# Patient Record
Sex: MALE | Race: WHITE | Employment: OTHER | URBAN - METROPOLITAN AREA
[De-identification: names, ages, dates, MRNs, and addresses within clinical notes are randomized per-mention and may not be internally consistent; named-entity substitution may affect disease eponyms.]

---

## 2017-01-06 RX ORDER — CLOMIPRAMINE HYDROCHLORIDE 75 MG/1
CAPSULE ORAL
Qty: 180 CAPSULE | Refills: 2 | Status: SHIPPED | OUTPATIENT
Start: 2017-01-06 | End: 2017-08-22 | Stop reason: SDUPTHER

## 2017-01-26 ENCOUNTER — OFFICE VISIT (OUTPATIENT)
Dept: INTERNAL MEDICINE CLINIC | Age: 67
End: 2017-01-26

## 2017-01-26 VITALS
BODY MASS INDEX: 31.71 KG/M2 | HEART RATE: 66 BPM | HEIGHT: 67 IN | WEIGHT: 202 LBS | SYSTOLIC BLOOD PRESSURE: 116 MMHG | DIASTOLIC BLOOD PRESSURE: 80 MMHG | RESPIRATION RATE: 12 BRPM

## 2017-01-26 DIAGNOSIS — Z00.00 ANNUAL PHYSICAL EXAM: Primary | ICD-10-CM

## 2017-01-26 DIAGNOSIS — Z11.59 NEED FOR HEPATITIS C SCREENING TEST: ICD-10-CM

## 2017-01-26 DIAGNOSIS — E78.5 DYSLIPIDEMIA: ICD-10-CM

## 2017-01-26 DIAGNOSIS — Z23 NEED FOR TDAP VACCINATION: ICD-10-CM

## 2017-01-26 DIAGNOSIS — Z12.5 SCREENING FOR PROSTATE CANCER: ICD-10-CM

## 2017-01-26 LAB
A/G RATIO: 2.1 (ref 1.1–2.2)
ALBUMIN SERPL-MCNC: 4.7 G/DL (ref 3.4–5)
ALP BLD-CCNC: 56 U/L (ref 40–129)
ALT SERPL-CCNC: 31 U/L (ref 10–40)
ANION GAP SERPL CALCULATED.3IONS-SCNC: 15 MMOL/L (ref 3–16)
AST SERPL-CCNC: 30 U/L (ref 15–37)
BASOPHILS ABSOLUTE: 0.1 K/UL (ref 0–0.2)
BASOPHILS RELATIVE PERCENT: 0.9 %
BILIRUB SERPL-MCNC: 0.3 MG/DL (ref 0–1)
BUN BLDV-MCNC: 22 MG/DL (ref 7–20)
CALCIUM SERPL-MCNC: 10.1 MG/DL (ref 8.3–10.6)
CHLORIDE BLD-SCNC: 98 MMOL/L (ref 99–110)
CHOLESTEROL, TOTAL: 128 MG/DL (ref 0–199)
CO2: 26 MMOL/L (ref 21–32)
CREAT SERPL-MCNC: 1.2 MG/DL (ref 0.8–1.3)
EOSINOPHILS ABSOLUTE: 0.2 K/UL (ref 0–0.6)
EOSINOPHILS RELATIVE PERCENT: 3.4 %
GFR AFRICAN AMERICAN: >60
GFR NON-AFRICAN AMERICAN: >60
GLOBULIN: 2.2 G/DL
GLUCOSE BLD-MCNC: 93 MG/DL (ref 70–99)
HCT VFR BLD CALC: 40.7 % (ref 40.5–52.5)
HDLC SERPL-MCNC: 50 MG/DL (ref 40–60)
HEMOGLOBIN: 13.2 G/DL (ref 13.5–17.5)
HEPATITIS C ANTIBODY INTERPRETATION: NORMAL
LDL CHOLESTEROL CALCULATED: 56 MG/DL
LYMPHOCYTES ABSOLUTE: 1.4 K/UL (ref 1–5.1)
LYMPHOCYTES RELATIVE PERCENT: 21.3 %
MCH RBC QN AUTO: 32.6 PG (ref 26–34)
MCHC RBC AUTO-ENTMCNC: 32.5 G/DL (ref 31–36)
MCV RBC AUTO: 100.4 FL (ref 80–100)
MONOCYTES ABSOLUTE: 0.4 K/UL (ref 0–1.3)
MONOCYTES RELATIVE PERCENT: 6.6 %
NEUTROPHILS ABSOLUTE: 4.4 K/UL (ref 1.7–7.7)
NEUTROPHILS RELATIVE PERCENT: 67.8 %
PDW BLD-RTO: 13.8 % (ref 12.4–15.4)
PLATELET # BLD: 297 K/UL (ref 135–450)
PMV BLD AUTO: 8.3 FL (ref 5–10.5)
POTASSIUM SERPL-SCNC: 4.9 MMOL/L (ref 3.5–5.1)
PROSTATE SPECIFIC ANTIGEN: 0.17 NG/ML (ref 0–4)
RBC # BLD: 4.05 M/UL (ref 4.2–5.9)
SODIUM BLD-SCNC: 139 MMOL/L (ref 136–145)
TOTAL PROTEIN: 6.9 G/DL (ref 6.4–8.2)
TRIGL SERPL-MCNC: 111 MG/DL (ref 0–150)
TSH SERPL DL<=0.05 MIU/L-ACNC: 2.77 UIU/ML (ref 0.27–4.2)
VITAMIN D 25-HYDROXY: 47.3 NG/ML
VLDLC SERPL CALC-MCNC: 22 MG/DL
WBC # BLD: 6.5 K/UL (ref 4–11)

## 2017-01-26 PROCEDURE — 99397 PER PM REEVAL EST PAT 65+ YR: CPT | Performed by: INTERNAL MEDICINE

## 2017-01-26 PROCEDURE — 90715 TDAP VACCINE 7 YRS/> IM: CPT | Performed by: INTERNAL MEDICINE

## 2017-01-26 PROCEDURE — 93000 ELECTROCARDIOGRAM COMPLETE: CPT | Performed by: INTERNAL MEDICINE

## 2017-01-26 PROCEDURE — 90471 IMMUNIZATION ADMIN: CPT | Performed by: INTERNAL MEDICINE

## 2017-01-27 LAB
ESTIMATED AVERAGE GLUCOSE: 111.2 MG/DL
HBA1C MFR BLD: 5.5 %

## 2017-04-06 RX ORDER — FENOFIBRATE 134 MG/1
CAPSULE ORAL
Qty: 90 CAPSULE | Refills: 3 | Status: SHIPPED | OUTPATIENT
Start: 2017-04-06 | End: 2018-04-05 | Stop reason: SDUPTHER

## 2017-07-27 ENCOUNTER — OFFICE VISIT (OUTPATIENT)
Dept: INTERNAL MEDICINE CLINIC | Age: 67
End: 2017-07-27

## 2017-07-27 VITALS
SYSTOLIC BLOOD PRESSURE: 110 MMHG | DIASTOLIC BLOOD PRESSURE: 67 MMHG | HEART RATE: 67 BPM | BODY MASS INDEX: 30.7 KG/M2 | WEIGHT: 196 LBS

## 2017-07-27 DIAGNOSIS — I83.93 VARICOSE VEINS OF BOTH LOWER EXTREMITIES: ICD-10-CM

## 2017-07-27 DIAGNOSIS — E78.5 DYSLIPIDEMIA: Primary | ICD-10-CM

## 2017-07-27 DIAGNOSIS — I87.2 VENOUS INSUFFICIENCY OF BOTH LOWER EXTREMITIES: ICD-10-CM

## 2017-07-27 DIAGNOSIS — F32.5 MAJOR DEPRESSIVE DISORDER WITH SINGLE EPISODE, IN FULL REMISSION (HCC): ICD-10-CM

## 2017-07-27 DIAGNOSIS — R06.83 SNORING: ICD-10-CM

## 2017-07-27 PROCEDURE — 99214 OFFICE O/P EST MOD 30 MIN: CPT | Performed by: INTERNAL MEDICINE

## 2017-07-31 ENCOUNTER — TELEPHONE (OUTPATIENT)
Dept: SLEEP MEDICINE | Age: 67
End: 2017-07-31

## 2017-08-22 RX ORDER — CLOMIPRAMINE HYDROCHLORIDE 75 MG/1
CAPSULE ORAL
Qty: 180 CAPSULE | Refills: 3 | Status: SHIPPED | OUTPATIENT
Start: 2017-08-22 | End: 2018-08-09 | Stop reason: SDUPTHER

## 2017-08-25 RX ORDER — METOPROLOL SUCCINATE 100 MG/1
TABLET, EXTENDED RELEASE ORAL
Qty: 90 TABLET | Refills: 3 | Status: SHIPPED | OUTPATIENT
Start: 2017-08-25 | End: 2018-11-08 | Stop reason: SDUPTHER

## 2018-01-29 ENCOUNTER — OFFICE VISIT (OUTPATIENT)
Dept: INTERNAL MEDICINE CLINIC | Age: 68
End: 2018-01-29

## 2018-01-29 VITALS
WEIGHT: 203 LBS | SYSTOLIC BLOOD PRESSURE: 120 MMHG | RESPIRATION RATE: 12 BRPM | HEIGHT: 67 IN | DIASTOLIC BLOOD PRESSURE: 74 MMHG | HEART RATE: 72 BPM | BODY MASS INDEX: 31.86 KG/M2

## 2018-01-29 DIAGNOSIS — M25.552 LEFT HIP PAIN: ICD-10-CM

## 2018-01-29 DIAGNOSIS — Z00.00 ANNUAL PHYSICAL EXAM: Primary | ICD-10-CM

## 2018-01-29 DIAGNOSIS — M19.041 PRIMARY OSTEOARTHRITIS OF BOTH HANDS: ICD-10-CM

## 2018-01-29 DIAGNOSIS — I87.2 VENOUS INSUFFICIENCY OF BOTH LOWER EXTREMITIES: ICD-10-CM

## 2018-01-29 DIAGNOSIS — Z12.5 SCREENING FOR PROSTATE CANCER: ICD-10-CM

## 2018-01-29 DIAGNOSIS — E78.5 DYSLIPIDEMIA: ICD-10-CM

## 2018-01-29 DIAGNOSIS — M19.042 PRIMARY OSTEOARTHRITIS OF BOTH HANDS: ICD-10-CM

## 2018-01-29 LAB
A/G RATIO: 1.7 (ref 1.1–2.2)
ALBUMIN SERPL-MCNC: 4.5 G/DL (ref 3.4–5)
ALP BLD-CCNC: 70 U/L (ref 40–129)
ALT SERPL-CCNC: 22 U/L (ref 10–40)
ANION GAP SERPL CALCULATED.3IONS-SCNC: 13 MMOL/L (ref 3–16)
AST SERPL-CCNC: 21 U/L (ref 15–37)
BASOPHILS ABSOLUTE: 0 K/UL (ref 0–0.2)
BASOPHILS RELATIVE PERCENT: 0.6 %
BILIRUB SERPL-MCNC: 0.3 MG/DL (ref 0–1)
BUN BLDV-MCNC: 19 MG/DL (ref 7–20)
CALCIUM SERPL-MCNC: 9.9 MG/DL (ref 8.3–10.6)
CHLORIDE BLD-SCNC: 102 MMOL/L (ref 99–110)
CHOLESTEROL, TOTAL: 166 MG/DL (ref 0–199)
CO2: 28 MMOL/L (ref 21–32)
CREAT SERPL-MCNC: 1.1 MG/DL (ref 0.8–1.3)
EOSINOPHILS ABSOLUTE: 0.2 K/UL (ref 0–0.6)
EOSINOPHILS RELATIVE PERCENT: 4.6 %
GFR AFRICAN AMERICAN: >60
GFR NON-AFRICAN AMERICAN: >60
GLOBULIN: 2.7 G/DL
GLUCOSE BLD-MCNC: 98 MG/DL (ref 70–99)
HCT VFR BLD CALC: 39.7 % (ref 40.5–52.5)
HDLC SERPL-MCNC: 50 MG/DL (ref 40–60)
HEMOGLOBIN: 13 G/DL (ref 13.5–17.5)
LDL CHOLESTEROL CALCULATED: 83 MG/DL
LYMPHOCYTES ABSOLUTE: 1.1 K/UL (ref 1–5.1)
LYMPHOCYTES RELATIVE PERCENT: 25 %
MCH RBC QN AUTO: 33 PG (ref 26–34)
MCHC RBC AUTO-ENTMCNC: 33.3 G/DL (ref 31–36)
MCV RBC AUTO: 99.1 FL (ref 80–100)
MONOCYTES ABSOLUTE: 0.4 K/UL (ref 0–1.3)
MONOCYTES RELATIVE PERCENT: 8 %
NEUTROPHILS ABSOLUTE: 2.8 K/UL (ref 1.7–7.7)
NEUTROPHILS RELATIVE PERCENT: 61.8 %
PDW BLD-RTO: 14.2 % (ref 12.4–15.4)
PLATELET # BLD: 316 K/UL (ref 135–450)
PMV BLD AUTO: 8.1 FL (ref 5–10.5)
POTASSIUM SERPL-SCNC: 5.6 MMOL/L (ref 3.5–5.1)
PROSTATE SPECIFIC ANTIGEN: 0.14 NG/ML (ref 0–4)
RBC # BLD: 4.01 M/UL (ref 4.2–5.9)
SODIUM BLD-SCNC: 143 MMOL/L (ref 136–145)
TOTAL PROTEIN: 7.2 G/DL (ref 6.4–8.2)
TRIGL SERPL-MCNC: 165 MG/DL (ref 0–150)
TSH SERPL DL<=0.05 MIU/L-ACNC: 4.04 UIU/ML (ref 0.27–4.2)
VLDLC SERPL CALC-MCNC: 33 MG/DL
WBC # BLD: 7.1 K/UL (ref 4–11)

## 2018-01-29 PROCEDURE — 99397 PER PM REEVAL EST PAT 65+ YR: CPT | Performed by: INTERNAL MEDICINE

## 2018-01-29 PROCEDURE — 93000 ELECTROCARDIOGRAM COMPLETE: CPT | Performed by: INTERNAL MEDICINE

## 2018-01-29 RX ORDER — CELECOXIB 200 MG/1
200 CAPSULE ORAL DAILY
Qty: 30 CAPSULE | Refills: 2 | Status: SHIPPED | OUTPATIENT
Start: 2018-01-29 | End: 2018-02-07 | Stop reason: ALTCHOICE

## 2018-01-29 ASSESSMENT — PATIENT HEALTH QUESTIONNAIRE - PHQ9
SUM OF ALL RESPONSES TO PHQ9 QUESTIONS 1 & 2: 0
1. LITTLE INTEREST OR PLEASURE IN DOING THINGS: 0
SUM OF ALL RESPONSES TO PHQ QUESTIONS 1-9: 0
2. FEELING DOWN, DEPRESSED OR HOPELESS: 0

## 2018-01-29 NOTE — PROGRESS NOTES
trauma. PSYCHIATRIC:  Neg  Depression, anxiety, personality changes, nervousness, No psych counseling: Yes, Psychotropics: Yes. 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 H/O Alcohol, Benzo and narcotic abuse  SKIN :  Neg  Rash,  sun burns, tattoos, change in moles, or skin color changes.  + Nail changes. Kal Snell, SHERIDAN (Dr. Kassie Marte). ENDOCRINE:  Neg  Polydipsia,polyuria,abnormal weight changes,heat /cold intolerance, Hair changes. No Diabetes, or Thyroid disease. Preventive Care:    Health Maintenance   Topic Date Due    Colon cancer screen colonoscopy  11/07/2018    Lipid screen  01/26/2022    DTaP/Tdap/Td vaccine (2 - Td) 01/26/2027    Zostavax vaccine  Completed    Flu vaccine  Completed    Pneumococcal low/med risk  Completed    Hepatitis C screen  Completed      Self-testicular exams: Yes  Sexual activity: none  Last eye exam: 11/2016, normal, glasses. Exercise: no regular exercise. Diet: \"Could be better. \"  He tries to adhere to LF. Seatbelt use: Y  Sunscreen Use: Y  Dentist:         Physical Exam    /74   Pulse 72   Resp 12   Ht 5' 7\" (1.702 m)   Wt 203 lb (92.1 kg)   BMI 31.79 kg/m²     Wt Readings from Last 3 Encounters:   01/29/18 203 lb (92.1 kg)   07/27/17 196 lb (88.9 kg)   01/26/17 202 lb (91.6 kg)     GEN:  75 y.o. male who is in NAD, A&O. He appears stated age and well nourished. He is cooperative and pleasant. Overweight  HEAD:  NC/AT, no lesions. EYES:  ERUM, EOMI, No scleral icterus or conjunctival injection or discharge. Visual fields in tact to confrontation. EARS:  TM's NL  NOSE:  Nasal cavity is clear. No mucosal congestion or discharge. Sinuses are nontender. MOUTH & THROAT:  Oral cavity is clear without mucosal lesions. Tongue is midline. Dentition is in good repair. No pharyngeal erythema or exudate. NECK:  Supple. Full ROM. Trachea is midline. No increased JVD. No thyromegaly or nodules.   No masses  LYMPH: No C/SC/A/F nodes  CARDIAC:  S1S2 Component Value Date    PSA 0.17 01/26/2017     Test recommended and ordered    Colon Cancer Screen  Last colonoscopy: 11/7/2013, Dr. Arnold Centers is due this Nov, 2018. Diabetes Screen  Glucose (mg/dL)   Date Value   01/26/2017 93    Test recommended and ordered   Cholesterol Screen  Lab Results   Component Value Date    CHOL 128 01/26/2017    TRIG 111 01/26/2017    HDL 50 01/26/2017    LDLCALC 56 01/26/2017    Test recommended and ordered   HIV screening recommended for those ages 12-76 NO MATTER THE RISK FOR HIV-- None on file. Not clinically indicated   Hepatitis C screening recommended for those born between [facility]-- 1/26/2017  No need to repeat at this time    Aspirin for Cardiovascular Prevention   yes Continue daily aspirin    Recommended Immunizations    Immunization History   Administered Date(s) Administered    Hepatitis A 10/20/2015, 04/18/2016    Hepatitis B (Engerix-B) 10/20/2015, 04/18/2016    Hepatitis B (Recombivax HB) 12/29/2015    Influenza Vaccine, unspecified formulation 11/11/2016    Influenza Virus Vaccine 10/16/2013, 09/09/2015, 09/28/2015    Influenza, High Dose 09/09/2017    Pneumococcal 13-valent Conjugate (Dmwvnjl19) 10/20/2015    Pneumococcal Polysaccharide (Fctoejsph50) 06/27/2014    Tdap (Boostrix, Adacel) 01/26/2017    Tetanus 09/19/2007    Zoster 06/18/2008    Influenza vaccine: recommended every fall-- Up to date    Pneumonia vaccine: Prevnar is due    Shingles vaccine: No need to repeat    Tetanus vaccine: tetanus and diptheria vaccine (Td/Tdap) recommended every 10 years- Td is due in 2027        Additional Recommendations   1. Continue to use sunscreen to help reduce the risk of skin cancer. Use sunscreen daily as part of your daily routine  2. Continue to focus on lifestyle modification including a low-fat, portion control diet along with establishing a regular exercise regimen. Walking for 30 minutes 5 days week is a good start  3.  Always wear a

## 2018-01-29 NOTE — PATIENT INSTRUCTIONS
modification including a low-fat, portion control diet along with establishing a regular exercise regimen. Walking for 30 minutes 5 days week is a good start  3. Always wear a seat belt while in a car  4. Always wear a helmet when riding a bike  5. Update eye exam every year  6. Perform monthly self testicle checks  7. See her dermatologist yearly          Here are a few  Reliable websites with a variety of health and wellness information:   www.mylifecheck. heart. org     www.nutritionsource. org     www. americanheart. org     www. diabetes. org     www.[facility]     www.CORD:USE [facility] ([address] [facility] site)          Patient Education        Arthritis: Care Instructions  Your Care Instructions  Arthritis, also called osteoarthritis, is a breakdown of the cartilage that cushions your joints. When the cartilage wears down, your bones rub against each other. This causes pain and stiffness. Many people have some arthritis as they age. Arthritis most often affects the joints of the spine, hands, hips, knees, or feet. You can take simple measures to protect your joints, ease your pain, and help you stay active. Follow-up care is a key part of your treatment and safety. Be sure to make and go to all appointments, and call your doctor if you are having problems. It's also a good idea to know your test results and keep a list of the medicines you take. How can you care for yourself at home? · Stay at a healthy weight. Being overweight puts extra strain on your joints. · Talk to your doctor or physical therapist about exercises that will help ease joint pain. ¨ Stretch. You may enjoy gentle forms of yoga to help keep your joints and muscles flexible. ¨ Walk instead of jog. Other types of exercise that are less stressful on the joints include riding a bicycle, swimming, pepper chi, or water exercise. ¨ Lift weights. Strong muscles help reduce stress on your joints.  Stronger thigh muscles, for example, take some of the as of: October 31, 2016  Content Version: 11.5  © 3238-8691 Speedshape. Care instructions adapted under license by [facility] [address] If you have questions about a medical condition or this instruction, always ask your healthcare professional. Lisa Shah any warranty or liability for your use of this information. Patient Education        Hand Arthritis: Exercises  Your Care Instructions  Here are some examples of exercises for hand arthritis. Start each exercise slowly. Ease off the exercise if you start to have pain. Your doctor or your physical or occupational therapist will tell you when you can start these exercises and which ones will work best for you. How to do the exercises  Tendon glides    1. In this exercise, the steps follow one another to a make a continuous movement. 2. With your affected hand, point your fingers and thumb straight up. Your wrist should be relaxed, following the line of your fingers and thumb. 3. Curl your fingers so that the top two joints in them are bent, and your fingers wrap down. Your fingertips should touch or be near the base of your fingers. Your fingers will look like a hook. 4. Make a fist by bending your knuckles. Your thumb can gently rest against your index (pointing) finger. 5. Unwind your fingers slightly so that your fingertips can touch the base of your palm. Your thumb can rest against your index finger. 6. Move back to your starting position, with your fingers and thumb pointing up. 7. Repeat the series of motions 8 to 12 times. 8. Switch hands and repeat steps 1 through 6, even if only one hand is sore. Intrinsic flexion    1. Rest your affected hand on a table and bend the large joints where your fingers connect to your hand. Keep your thumb and the other joints in your fingers straight. 2. Slowly straighten your fingers. Your wrist should be relaxed, following the line of your fingers and thumb.   3. Move back to week. Walking is a good choice. You also may want to do other activities, such as running, swimming, cycling, or playing tennis or team sports. · Stay at a healthy weight. Lose weight if you need to. · Don't smoke. If you need help quitting, talk to your doctor about stop-smoking programs and medicines. These can increase your chances of quitting for good. How is high cholesterol treated? The goal of treatment is to reduce your chances of having a heart attack or stroke. The goal is not to lower your cholesterol numbers only. · You may make lifestyle changes, such as eating healthy foods, not smoking, losing weight, and being more active. · You may have to take medicine. Follow-up care is a key part of your treatment and safety. Be sure to make and go to all appointments, and call your doctor if you are having problems. It's also a good idea to know your test results and keep a list of the medicines you take. Where can you learn more? Go to https://THE Football ApppeNuScriptRx.Causes. org and sign in to your PS DEPT. account. Enter N356 in the WorldViz box to learn more about \"Learning About High Cholesterol. \"     If you do not have an account, please click on the \"Sign Up Now\" link. Current as of: September 21, 2016  Content Version: 11.5  © 5610-8297 [employer]. Care instructions adapted under license by [facility] ([facility]). If you have questions about a medical condition or this instruction, always ask your healthcare professional. [place] [address] any warranty or liability for your use of this information. Patient Education        Osteoarthritis: Care Instructions  Your Care Instructions    Arthritis is a common health problem in which the joints are inflamed. There are several kinds of arthritis. Osteoarthritis is caused by a breakdown of cartilage, the hard, thick tissue that cushions the joints.  It causes pain, stiffness, and swelling, often in the spine, bad that you cannot use the joint. ? · You have sudden back pain with weakness in your legs or loss of bowel or bladder control. ? · Your stools are black and tarlike or have streaks of blood. ? · You have severe pain and swelling in more than one joint. ? Watch closely for changes in your health, and be sure to contact your doctor if:  ? · You have side effects from the medicines, like belly pain, ongoing heartburn, or nausea. ? · Joint pain continues for more than 6 weeks, and home treatment is not helping. Where can you learn more? Go to https://dVentus TechnologiespeJumpztereb.KeraFAST. org and sign in to your TV Compass account. Enter P189 in the [place] box to learn more about \"Osteoarthritis: Care Instructions. \"     If you do not have an account, please click on the \"Sign Up Now\" link. Current as of: October 31, 2016  Content Version: 11.5  © 7488-0207 Neomobile. Care instructions adapted under license by [facility] ([facility]). If you have questions about a medical condition or this instruction, always ask your healthcare professional. [place] [address] any warranty or liability for your use of this information. Patient Education        Prostate Cancer Screening: Care Instructions  Your Care Instructions    The prostate gland is an organ found just below a man's bladder. It is the size and shape of a walnut. It surrounds the tube that carries urine from the bladder out of the body through the penis. This tube is called the urethra. Prostate cancer is the abnormal growth of cells in the prostate. It is the second most common type of cancer in men. (Skin cancer is the most common.)  Most cases of prostate cancer occur in men older than 72. The disease runs in families. And it's more common in -American men. When it's found and treated early, prostate cancer may be cured. But it is not always treated.  This is because prostate cancer may not shorten your life,

## 2018-01-30 ENCOUNTER — TELEPHONE (OUTPATIENT)
Dept: INTERNAL MEDICINE CLINIC | Age: 68
End: 2018-01-30

## 2018-02-07 RX ORDER — MELOXICAM 7.5 MG/1
7.5 TABLET ORAL DAILY
Qty: 30 TABLET | Refills: 3 | Status: SHIPPED | OUTPATIENT
Start: 2018-02-07 | End: 2018-06-14 | Stop reason: SDUPTHER

## 2018-04-05 RX ORDER — FENOFIBRATE 134 MG/1
CAPSULE ORAL
Qty: 90 CAPSULE | Refills: 3 | Status: SHIPPED | OUTPATIENT
Start: 2018-04-05 | End: 2019-05-03 | Stop reason: SDUPTHER

## 2018-06-14 RX ORDER — MELOXICAM 7.5 MG/1
7.5 TABLET ORAL DAILY
Qty: 90 TABLET | Refills: 3 | Status: SHIPPED | OUTPATIENT
Start: 2018-06-14 | End: 2018-07-26 | Stop reason: DRUGHIGH

## 2018-07-26 ENCOUNTER — OFFICE VISIT (OUTPATIENT)
Dept: INTERNAL MEDICINE CLINIC | Age: 68
End: 2018-07-26

## 2018-07-26 VITALS
WEIGHT: 215 LBS | DIASTOLIC BLOOD PRESSURE: 68 MMHG | HEIGHT: 67 IN | BODY MASS INDEX: 33.74 KG/M2 | RESPIRATION RATE: 12 BRPM | SYSTOLIC BLOOD PRESSURE: 130 MMHG | HEART RATE: 76 BPM

## 2018-07-26 DIAGNOSIS — Z80.0 FAMILY HISTORY OF COLON CANCER: ICD-10-CM

## 2018-07-26 DIAGNOSIS — I87.2 VENOUS INSUFFICIENCY OF BOTH LOWER EXTREMITIES: ICD-10-CM

## 2018-07-26 DIAGNOSIS — G89.29 CHRONIC LEFT-SIDED LOW BACK PAIN WITHOUT SCIATICA: ICD-10-CM

## 2018-07-26 DIAGNOSIS — Z12.11 SCREEN FOR COLON CANCER: ICD-10-CM

## 2018-07-26 DIAGNOSIS — I10 BENIGN ESSENTIAL HTN: ICD-10-CM

## 2018-07-26 DIAGNOSIS — E78.5 DYSLIPIDEMIA: Primary | ICD-10-CM

## 2018-07-26 DIAGNOSIS — M54.50 CHRONIC LEFT-SIDED LOW BACK PAIN WITHOUT SCIATICA: ICD-10-CM

## 2018-07-26 PROCEDURE — 99214 OFFICE O/P EST MOD 30 MIN: CPT | Performed by: INTERNAL MEDICINE

## 2018-07-26 RX ORDER — MELOXICAM 7.5 MG/1
15 TABLET ORAL DAILY
Qty: 90 TABLET | Refills: 3 | Status: SHIPPED | OUTPATIENT
Start: 2018-07-26 | End: 2018-10-19 | Stop reason: DRUGHIGH

## 2018-07-26 NOTE — PROGRESS NOTES
[facility]) Physicians  Internal Medicine  Patient Encounter  Alejandro Westfall D.O., Jhon Kincaid        Chief Complaint   Patient presents with    Medication Check    Check-Up       HPI: 70 y.o. male seen today for follow up regarding status of his current chronic medical problems listed below along with a med review. At his last visit patient was complaining of increased fatigue and snoring. He was referred for a polysomnogram.  He started an oral appliance which has helped. He did not do the sleep study. He continues to C/O Left lumbosacral region. Pain is worse after period of inactivity. He also has stiffness. Once up moving around, he loosens up. He denies radicular pain. He denies N/T. He was using meloxicam.   He denies adverse effects. CHOL-- patient remains on fenofibrate 134 mg daily. His last lipid profile revealed a triglyceride level of 165, LDL 83, total cholesterol 166, HDL 50. LFTs within normal.  He had lost some weight on a low carb diet, but gained it all back. HTN-- Patient continues to do well on Toprol-XL alone to control his blood pressure. BP at the office-- 120's/60's. Venous reflux-- Previous history----> H/O Left calf DVT x 1. He was on Eliquis. He has also had Superficial thrombophlebitis. He was on Ibuprofen for that. He did have a repeat duplex which showed reflux. No surgery recommended. Compression stockings recommended. Duplex--   Impressions   Right Impression   No evidence of deep or superficial venous thrombosis of the right lower   extremity. Evidence of severe venous insufficiency of the right CFV, saphenofemoral   junction (reflux of 0.5 seconds), anterior accessory saphenous vein (reflux of   1 second) that extend to the knee varicosities. Left Impression   No evidence of deep or superficial venous thrombosis of the left lower   extremity.    Severe valvular incompetence of the left CFV, saphenofemoral junction (reflux   of 2.7 seconds), cancer  Colonoscopy due in November 2018  - Kyler Valenzuela MD ([facility])    3. Family history of colon cancer  Colonoscopy due November 2018  - Kyler Valenzuela MD ([facility])    4. Benign essential HTN  Blood pressure is well controlled  Continue to monitor for increasing blood pressure  Continue no added salt diet  Continue Toprol-XL  - CBC Auto Differential; Future  - Comprehensive Metabolic Panel; Future  - Lipid Panel; Future    5. Venous insufficiency of both lower extremities  Condition is well controlled with compression stockings  Continue compression stockings  Continue no added salt diet      6. Chronic left-sided low back pain without sciatica  Condition persist  Increase meloxicam to 15 mg daily with food  X-ray lumbar spine  Home exercise program  - XR LUMBAR SPINE (MIN 4 VIEWS);  Future

## 2018-07-26 NOTE — PATIENT INSTRUCTIONS
hours. Put a thin cloth between the ice pack and your skin. · Take pain medicines exactly as directed. ¨ If the doctor gave you a prescription medicine for pain, take it as prescribed. ¨ If you are not taking a prescription pain medicine, ask your doctor if you can take an over-the-counter medicine. · Take short walks several times a day. You can start with 5 to 10 minutes, 3 or 4 times a day, and work up to longer walks. Walk on level surfaces and avoid hills and stairs until your back is better. · Return to work and other activities as soon as you can. Continued rest without activity is usually not good for your back. · To prevent future back pain, do exercises to stretch and strengthen your back and stomach. Learn how to use good posture, safe lifting techniques, and proper body mechanics. When should you call for help? Call your doctor now or seek immediate medical care if:    · You have new or worsening numbness in your legs.     · You have new or worsening weakness in your legs. (This could make it hard to stand up.)     · You lose control of your bladder or bowels.    Watch closely for changes in your health, and be sure to contact your doctor if:    · Your pain gets worse.     · You are not getting better after 2 weeks. Where can you learn more? Go to https://Thundersoft.AUPEO!. org and sign in to your [email or web address] account. Enter R057 in the Rollbar box to learn more about \"Back Pain: Care Instructions. \"     If you do not have an account, please click on the \"Sign Up Now\" link. Current as of: November 29, 2017  Content Version: 11.6  © 9528-7297 Quidsi, [employer]orporated. Care instructions adapted under license by [facility] ([facility]). If you have questions about a medical condition or this instruction, always ask your healthcare professional. [place] [address] any warranty or liability for your use of this information.        Patient Education        Low Back Pain: Exercises  Your Care Instructions  Here are some examples of typical rehabilitation exercises for your condition. Start each exercise slowly. Ease off the exercise if you start to have pain. Your doctor or physical therapist will tell you when you can start these exercises and which ones will work best for you. How to do the exercises  Press-up    1. Lie on your stomach, supporting your body with your forearms. 2. Press your elbows down into the floor to raise your upper back. As you do this, relax your stomach muscles and allow your back to arch without using your back muscles. As your press up, do not let your hips or pelvis come off the floor. 3. Hold for 15 to 30 seconds, then relax. 4. Repeat 2 to 4 times. Alternate arm and leg (bird dog) exercise    Do this exercise slowly. Try to keep your body straight at all times, and do not let one hip drop lower than the other. 1. Start on the floor, on your hands and knees. 2. Tighten your belly muscles. 3. Raise one leg off the floor, and hold it straight out behind you. Be careful not to let your hip drop down, because that will twist your trunk. 4. Hold for about 6 seconds, then lower your leg and switch to the other leg. 5. Repeat 8 to 12 times on each leg. 6. Over time, work up to holding for 10 to 30 seconds each time. 7. If you feel stable and secure with your leg raised, try raising the opposite arm straight out in front of you at the same time. Knee-to-chest exercise    1. Lie on your back with your knees bent and your feet flat on the floor. 2. Bring one knee to your chest, keeping the other foot flat on the floor (or keeping the other leg straight, whichever feels better on your lower back). 3. Keep your lower back pressed to the floor. Hold for at least 15 to 30 seconds. 4. Relax, and lower the knee to the starting position. 5. Repeat with the other leg. Repeat 2 to 4 times with each leg.   6. To get more stretch, put your other leg flat on the floor while pulling your knee to your chest.  Curl-ups    1. Lie on the floor on your back with your knees bent at a 90-degree angle. Your feet should be flat on the floor, about 12 inches from your buttocks. 2. Cross your arms over your chest. If this bothers your neck, try putting your hands behind your neck (not your head), with your elbows spread apart. 3. Slowly tighten your belly muscles and raise your shoulder blades off the floor. 4. Keep your head in line with your body, and do not press your chin to your chest.  5. Hold this position for 1 or 2 seconds, then slowly lower yourself back down to the floor. 6. Repeat 8 to 12 times. Pelvic tilt exercise    1. Lie on your back with your knees bent. 2. \"Brace\" your stomach. This means to tighten your muscles by pulling in and imagining your belly button moving toward your spine. You should feel like your back is pressing to the floor and your hips and pelvis are rocking back. 3. Hold for about 6 seconds while you breathe smoothly. 4. Repeat 8 to 12 times. Heel dig bridging    1. Lie on your back with both knees bent and your ankles bent so that only your heels are digging into the floor. Your knees should be bent about 90 degrees. 2. Then push your heels into the floor, squeeze your buttocks, and lift your hips off the floor until your shoulders, hips, and knees are all in a straight line. 3. Hold for about 6 seconds as you continue to breathe normally, and then slowly lower your hips back down to the floor and rest for up to 10 seconds. 4. Do 8 to 12 repetitions. Hamstring stretch in doorway    1. Lie on your back in a doorway, with one leg through the open door. 2. Slide your leg up the wall to straighten your knee. You should feel a gentle stretch down the back of your leg. 3. Hold the stretch for at least 15 to 30 seconds. Do not arch your back, point your toes, or bend either knee.  Keep one heel touching the floor and the other heel you when you can start these exercises and which ones will work best for you. When you are not being active, find a comfortable position for rest. Some people are comfortable on the floor or a medium-firm bed with a small pillow under their head and another under their knees. Some people prefer to lie on their side with a pillow between their knees. Don't stay in one position for too long. Take short walks (10 to 20 minutes) every 2 to 3 hours. Avoid slopes, hills, and stairs until you feel better. Walk only distances you can manage without pain, especially leg pain. How to do the exercises  Pelvic tilt    5. Lie on your back with your knees bent. 6. \"Brace\" your stomach-tighten your muscles by pulling in and imagining your belly button moving toward your spine. 7. Press your lower back into the floor. You should feel your hips and pelvis rock back. 8. Hold for 6 seconds while breathing smoothly. 9. Relax and allow your pelvis and hips to rock forward. 10. Repeat 8 to 12 times. Back stretches    8. Get down on your hands and knees on the floor. 9. Relax your head and allow it to droop. Round your back up toward the ceiling until you feel a nice stretch in your upper, middle, and lower back. Hold this stretch for as long as it feels comfortable, or about 15 to 30 seconds. 10. Return to the starting position with a flat back while you are on your hands and knees. 11. Let your back sway by pressing your stomach toward the floor. Lift your buttocks toward the ceiling. 12. Hold this position for 15 to 30 seconds. 13. Repeat 2 to 4 times. Follow-up care is a key part of your treatment and safety. Be sure to make and go to all appointments, and call your doctor if you are having problems. It's also a good idea to know your test results and keep a list of the medicines you take. Where can you learn more? Go to https://brandon.ElectroCore. org and sign in to your MobileAccess Networks account.  Enter Q066 in the

## 2018-08-09 RX ORDER — CLOMIPRAMINE HYDROCHLORIDE 75 MG/1
CAPSULE ORAL
Qty: 180 CAPSULE | Refills: 3 | Status: SHIPPED | OUTPATIENT
Start: 2018-08-09 | End: 2019-05-03 | Stop reason: SDUPTHER

## 2018-10-01 ENCOUNTER — HOSPITAL ENCOUNTER (OUTPATIENT)
Age: 68
Discharge: HOME | End: 2018-10-01
Payer: COMMERCIAL

## 2018-10-01 ENCOUNTER — HOSPITAL ENCOUNTER (OUTPATIENT)
Dept: GENERAL RADIOLOGY | Age: 68
Discharge: HOME | End: 2018-10-01
Payer: COMMERCIAL

## 2018-10-01 DIAGNOSIS — G89.29 CHRONIC LEFT-SIDED LOW BACK PAIN WITHOUT SCIATICA: ICD-10-CM

## 2018-10-01 DIAGNOSIS — M54.50 CHRONIC LEFT-SIDED LOW BACK PAIN WITHOUT SCIATICA: ICD-10-CM

## 2018-10-01 PROCEDURE — 72110 X-RAY EXAM L-2 SPINE 4/>VWS: CPT

## 2018-10-19 RX ORDER — MELOXICAM 15 MG/1
15 TABLET ORAL DAILY
Qty: 90 TABLET | Refills: 3 | Status: SHIPPED | OUTPATIENT
Start: 2018-10-19 | End: 2019-02-08 | Stop reason: ALTCHOICE

## 2018-11-08 RX ORDER — METOPROLOL SUCCINATE 100 MG/1
TABLET, EXTENDED RELEASE ORAL
Qty: 90 TABLET | Refills: 3 | Status: SHIPPED | OUTPATIENT
Start: 2018-11-08 | End: 2019-05-03 | Stop reason: SDUPTHER

## 2019-02-08 ENCOUNTER — OFFICE VISIT (OUTPATIENT)
Dept: INTERNAL MEDICINE CLINIC | Age: 69
End: 2019-02-08
Payer: MEDICARE

## 2019-02-08 VITALS
HEIGHT: 67 IN | SYSTOLIC BLOOD PRESSURE: 110 MMHG | OXYGEN SATURATION: 97 % | HEART RATE: 69 BPM | RESPIRATION RATE: 12 BRPM | DIASTOLIC BLOOD PRESSURE: 70 MMHG | WEIGHT: 207.8 LBS | BODY MASS INDEX: 32.62 KG/M2

## 2019-02-08 DIAGNOSIS — D10.30 PAPILLOMA OF ORAL CAVITY: ICD-10-CM

## 2019-02-08 DIAGNOSIS — Z23 NEED FOR PROPHYLACTIC VACCINATION AND INOCULATION AGAINST VARICELLA: ICD-10-CM

## 2019-02-08 DIAGNOSIS — E78.5 DYSLIPIDEMIA: ICD-10-CM

## 2019-02-08 DIAGNOSIS — Z00.00 ROUTINE GENERAL MEDICAL EXAMINATION AT A HEALTH CARE FACILITY: Primary | ICD-10-CM

## 2019-02-08 DIAGNOSIS — Z12.5 SCREENING FOR PROSTATE CANCER: ICD-10-CM

## 2019-02-08 DIAGNOSIS — G89.29 CHRONIC BILATERAL LOW BACK PAIN WITHOUT SCIATICA: ICD-10-CM

## 2019-02-08 DIAGNOSIS — M51.36 DDD (DEGENERATIVE DISC DISEASE), LUMBAR: ICD-10-CM

## 2019-02-08 DIAGNOSIS — M54.50 CHRONIC BILATERAL LOW BACK PAIN WITHOUT SCIATICA: ICD-10-CM

## 2019-02-08 DIAGNOSIS — M43.17 ACQUIRED SPONDYLOLISTHESIS OF LUMBOSACRAL REGION: ICD-10-CM

## 2019-02-08 PROBLEM — M51.369 DDD (DEGENERATIVE DISC DISEASE), LUMBAR: Status: ACTIVE | Noted: 2019-02-08

## 2019-02-08 PROCEDURE — G0402 INITIAL PREVENTIVE EXAM: HCPCS | Performed by: INTERNAL MEDICINE

## 2019-02-08 PROCEDURE — 4040F PNEUMOC VAC/ADMIN/RCVD: CPT | Performed by: INTERNAL MEDICINE

## 2019-02-08 RX ORDER — FENOFIBRATE 160 MG/1
160 TABLET ORAL DAILY
Qty: 90 TABLET | Refills: 3 | Status: SHIPPED | OUTPATIENT
Start: 2019-02-08 | End: 2019-05-13 | Stop reason: SDUPTHER

## 2019-02-08 ASSESSMENT — LIFESTYLE VARIABLES: HOW OFTEN DO YOU HAVE A DRINK CONTAINING ALCOHOL: 0

## 2019-02-08 ASSESSMENT — PATIENT HEALTH QUESTIONNAIRE - PHQ9
SUM OF ALL RESPONSES TO PHQ QUESTIONS 1-9: 0
SUM OF ALL RESPONSES TO PHQ QUESTIONS 1-9: 0

## 2019-02-08 ASSESSMENT — ANXIETY QUESTIONNAIRES: GAD7 TOTAL SCORE: 0

## 2019-02-14 ENCOUNTER — HOSPITAL ENCOUNTER (OUTPATIENT)
Dept: MRI IMAGING | Age: 69
Discharge: HOME | End: 2019-02-14
Payer: MEDICARE

## 2019-02-14 DIAGNOSIS — M51.36 DDD (DEGENERATIVE DISC DISEASE), LUMBAR: ICD-10-CM

## 2019-02-14 DIAGNOSIS — M54.50 CHRONIC BILATERAL LOW BACK PAIN WITHOUT SCIATICA: ICD-10-CM

## 2019-02-14 DIAGNOSIS — M43.17 ACQUIRED SPONDYLOLISTHESIS OF LUMBOSACRAL REGION: ICD-10-CM

## 2019-02-14 DIAGNOSIS — G89.29 CHRONIC BILATERAL LOW BACK PAIN WITHOUT SCIATICA: ICD-10-CM

## 2019-02-14 PROCEDURE — 72148 MRI LUMBAR SPINE W/O DYE: CPT

## 2019-02-15 DIAGNOSIS — M51.36 DDD (DEGENERATIVE DISC DISEASE), LUMBAR: Primary | ICD-10-CM

## 2019-02-15 DIAGNOSIS — M48.061 LUMBAR FORAMINAL STENOSIS: ICD-10-CM

## 2019-02-15 DIAGNOSIS — M48.061 SPINAL STENOSIS OF LUMBAR REGION WITHOUT NEUROGENIC CLAUDICATION: ICD-10-CM

## 2019-02-15 DIAGNOSIS — M47.816 LUMBAR FACET ARTHROPATHY: ICD-10-CM

## 2019-02-21 ENCOUNTER — OFFICE VISIT (OUTPATIENT)
Dept: ENT CLINIC | Age: 69
End: 2019-02-21
Payer: MEDICARE

## 2019-02-21 VITALS
DIASTOLIC BLOOD PRESSURE: 70 MMHG | BODY MASS INDEX: 29.78 KG/M2 | WEIGHT: 208 LBS | HEART RATE: 85 BPM | HEIGHT: 70 IN | SYSTOLIC BLOOD PRESSURE: 100 MMHG

## 2019-02-21 DIAGNOSIS — K13.79 PALATAL LESION: ICD-10-CM

## 2019-02-21 PROCEDURE — 42100 BIOPSY ROOF OF MOUTH: CPT | Performed by: OTOLARYNGOLOGY

## 2019-02-21 PROCEDURE — 1123F ACP DISCUSS/DSCN MKR DOCD: CPT | Performed by: OTOLARYNGOLOGY

## 2019-02-21 PROCEDURE — 1101F PT FALLS ASSESS-DOCD LE1/YR: CPT | Performed by: OTOLARYNGOLOGY

## 2019-02-21 PROCEDURE — 1036F TOBACCO NON-USER: CPT | Performed by: OTOLARYNGOLOGY

## 2019-02-21 PROCEDURE — G8417 CALC BMI ABV UP PARAM F/U: HCPCS | Performed by: OTOLARYNGOLOGY

## 2019-02-21 PROCEDURE — 4040F PNEUMOC VAC/ADMIN/RCVD: CPT | Performed by: OTOLARYNGOLOGY

## 2019-02-21 PROCEDURE — G8482 FLU IMMUNIZE ORDER/ADMIN: HCPCS | Performed by: OTOLARYNGOLOGY

## 2019-02-21 PROCEDURE — 3017F COLORECTAL CA SCREEN DOC REV: CPT | Performed by: OTOLARYNGOLOGY

## 2019-02-21 PROCEDURE — G8427 DOCREV CUR MEDS BY ELIG CLIN: HCPCS | Performed by: OTOLARYNGOLOGY

## 2019-02-21 PROCEDURE — 99203 OFFICE O/P NEW LOW 30 MIN: CPT | Performed by: OTOLARYNGOLOGY

## 2019-02-21 ASSESSMENT — ENCOUNTER SYMPTOMS
CHEST TIGHTNESS: 0
APNEA: 0
VOMITING: 0
STRIDOR: 0
TROUBLE SWALLOWING: 0
CHOKING: 0
COLOR CHANGE: 0
CONSTIPATION: 0
RHINORRHEA: 0
EYE PAIN: 0
DIARRHEA: 0
FACIAL SWELLING: 0
SINUS PAIN: 0
NAUSEA: 0
SHORTNESS OF BREATH: 0
COUGH: 0
EYE DISCHARGE: 0
SINUS PRESSURE: 0
SORE THROAT: 0
WHEEZING: 0
VOICE CHANGE: 0
BACK PAIN: 0
BLOOD IN STOOL: 0

## 2019-02-25 ENCOUNTER — OFFICE VISIT (OUTPATIENT)
Dept: ORTHOPEDIC SURGERY | Age: 69
End: 2019-02-25
Payer: MEDICARE

## 2019-02-25 ENCOUNTER — TELEPHONE (OUTPATIENT)
Dept: ORTHOPEDIC SURGERY | Age: 69
End: 2019-02-25

## 2019-02-25 VITALS
DIASTOLIC BLOOD PRESSURE: 86 MMHG | BODY MASS INDEX: 31.37 KG/M2 | WEIGHT: 207 LBS | HEIGHT: 68 IN | SYSTOLIC BLOOD PRESSURE: 129 MMHG

## 2019-02-25 DIAGNOSIS — M47.816 SPONDYLOSIS OF LUMBAR REGION WITHOUT MYELOPATHY OR RADICULOPATHY: Primary | ICD-10-CM

## 2019-02-25 DIAGNOSIS — M43.16 SPONDYLOLISTHESIS OF LUMBAR REGION: ICD-10-CM

## 2019-02-25 DIAGNOSIS — M48.061 LUMBAR FORAMINAL STENOSIS: ICD-10-CM

## 2019-02-25 PROCEDURE — 3017F COLORECTAL CA SCREEN DOC REV: CPT | Performed by: PHYSICAL MEDICINE & REHABILITATION

## 2019-02-25 PROCEDURE — G8427 DOCREV CUR MEDS BY ELIG CLIN: HCPCS | Performed by: PHYSICAL MEDICINE & REHABILITATION

## 2019-02-25 PROCEDURE — G8482 FLU IMMUNIZE ORDER/ADMIN: HCPCS | Performed by: PHYSICAL MEDICINE & REHABILITATION

## 2019-02-25 PROCEDURE — G8417 CALC BMI ABV UP PARAM F/U: HCPCS | Performed by: PHYSICAL MEDICINE & REHABILITATION

## 2019-02-25 PROCEDURE — 1101F PT FALLS ASSESS-DOCD LE1/YR: CPT | Performed by: PHYSICAL MEDICINE & REHABILITATION

## 2019-02-25 PROCEDURE — 99203 OFFICE O/P NEW LOW 30 MIN: CPT | Performed by: PHYSICAL MEDICINE & REHABILITATION

## 2019-02-26 ENCOUNTER — TELEPHONE (OUTPATIENT)
Dept: ORTHOPEDIC SURGERY | Age: 69
End: 2019-02-26

## 2019-02-26 ENCOUNTER — TELEPHONE (OUTPATIENT)
Dept: ENT CLINIC | Age: 69
End: 2019-02-26

## 2019-03-04 ENCOUNTER — APPOINTMENT (OUTPATIENT)
Dept: GENERAL RADIOLOGY | Age: 69
End: 2019-03-04
Attending: PHYSICAL MEDICINE & REHABILITATION
Payer: MEDICARE

## 2019-03-04 ENCOUNTER — TELEPHONE (OUTPATIENT)
Dept: INTERNAL MEDICINE CLINIC | Age: 69
End: 2019-03-04

## 2019-03-04 ENCOUNTER — HOSPITAL ENCOUNTER (OUTPATIENT)
Age: 69
Setting detail: OUTPATIENT SURGERY
Discharge: HOME | End: 2019-03-04
Attending: PHYSICAL MEDICINE & REHABILITATION | Admitting: PHYSICAL MEDICINE & REHABILITATION
Payer: MEDICARE

## 2019-03-04 VITALS
HEIGHT: 68 IN | TEMPERATURE: 97.4 F | OXYGEN SATURATION: 100 % | HEART RATE: 63 BPM | RESPIRATION RATE: 16 BRPM | SYSTOLIC BLOOD PRESSURE: 122 MMHG | DIASTOLIC BLOOD PRESSURE: 66 MMHG | BODY MASS INDEX: 31.47 KG/M2

## 2019-03-04 PROCEDURE — 3209999900 FLUORO FOR SURGICAL PROCEDURES

## 2019-03-04 PROCEDURE — 2500000003 HC RX 250 WO HCPCS: Performed by: PHYSICAL MEDICINE & REHABILITATION

## 2019-03-04 PROCEDURE — 3610000058 HC PAIN LEVEL 5 BASE (NON-OR): Performed by: PHYSICAL MEDICINE & REHABILITATION

## 2019-03-04 PROCEDURE — 2709999900 HC NON-CHARGEABLE SUPPLY: Performed by: PHYSICAL MEDICINE & REHABILITATION

## 2019-03-04 PROCEDURE — 3610000059 HC PAIN LEVEL 5 ADDL 15 MIN (NON-OR): Performed by: PHYSICAL MEDICINE & REHABILITATION

## 2019-03-04 RX ORDER — LIDOCAINE HYDROCHLORIDE 10 MG/ML
INJECTION, SOLUTION EPIDURAL; INFILTRATION; INTRACAUDAL; PERINEURAL
Status: COMPLETED | OUTPATIENT
Start: 2019-03-04 | End: 2019-03-04

## 2019-03-04 RX ORDER — BUPIVACAINE HYDROCHLORIDE 5 MG/ML
INJECTION, SOLUTION PERINEURAL
Status: COMPLETED | OUTPATIENT
Start: 2019-03-04 | End: 2019-03-04

## 2019-03-04 ASSESSMENT — PAIN - FUNCTIONAL ASSESSMENT: PAIN_FUNCTIONAL_ASSESSMENT: 0-10

## 2019-03-04 ASSESSMENT — PAIN SCALES - GENERAL
PAINLEVEL_OUTOF10: 0
PAINLEVEL_OUTOF10: 0

## 2019-03-04 ASSESSMENT — PAIN DESCRIPTION - DESCRIPTORS: DESCRIPTORS: ACHING

## 2019-03-11 ENCOUNTER — TELEPHONE (OUTPATIENT)
Dept: ORTHOPEDIC SURGERY | Age: 69
End: 2019-03-11

## 2019-03-11 ENCOUNTER — OFFICE VISIT (OUTPATIENT)
Dept: ORTHOPEDIC SURGERY | Age: 69
End: 2019-03-11
Payer: MEDICARE

## 2019-03-11 VITALS
HEIGHT: 68 IN | HEART RATE: 71 BPM | SYSTOLIC BLOOD PRESSURE: 140 MMHG | DIASTOLIC BLOOD PRESSURE: 80 MMHG | WEIGHT: 207.01 LBS | BODY MASS INDEX: 31.37 KG/M2

## 2019-03-11 DIAGNOSIS — M47.816 SPONDYLOSIS OF LUMBAR REGION WITHOUT MYELOPATHY OR RADICULOPATHY: Primary | ICD-10-CM

## 2019-03-11 DIAGNOSIS — M48.061 LUMBAR FORAMINAL STENOSIS: ICD-10-CM

## 2019-03-11 DIAGNOSIS — M43.16 SPONDYLOLISTHESIS OF LUMBAR REGION: ICD-10-CM

## 2019-03-11 PROCEDURE — G8417 CALC BMI ABV UP PARAM F/U: HCPCS | Performed by: PHYSICAL MEDICINE & REHABILITATION

## 2019-03-11 PROCEDURE — 99213 OFFICE O/P EST LOW 20 MIN: CPT | Performed by: PHYSICAL MEDICINE & REHABILITATION

## 2019-03-11 PROCEDURE — 1101F PT FALLS ASSESS-DOCD LE1/YR: CPT | Performed by: PHYSICAL MEDICINE & REHABILITATION

## 2019-03-11 PROCEDURE — G8482 FLU IMMUNIZE ORDER/ADMIN: HCPCS | Performed by: PHYSICAL MEDICINE & REHABILITATION

## 2019-03-11 PROCEDURE — 3017F COLORECTAL CA SCREEN DOC REV: CPT | Performed by: PHYSICAL MEDICINE & REHABILITATION

## 2019-03-11 PROCEDURE — 1036F TOBACCO NON-USER: CPT | Performed by: PHYSICAL MEDICINE & REHABILITATION

## 2019-03-11 PROCEDURE — G8427 DOCREV CUR MEDS BY ELIG CLIN: HCPCS | Performed by: PHYSICAL MEDICINE & REHABILITATION

## 2019-03-11 PROCEDURE — 1123F ACP DISCUSS/DSCN MKR DOCD: CPT | Performed by: PHYSICAL MEDICINE & REHABILITATION

## 2019-03-11 PROCEDURE — 4040F PNEUMOC VAC/ADMIN/RCVD: CPT | Performed by: PHYSICAL MEDICINE & REHABILITATION

## 2019-03-14 ENCOUNTER — TELEPHONE (OUTPATIENT)
Dept: ORTHOPEDIC SURGERY | Age: 69
End: 2019-03-14

## 2019-03-25 ENCOUNTER — APPOINTMENT (OUTPATIENT)
Dept: GENERAL RADIOLOGY | Age: 69
End: 2019-03-25
Attending: PHYSICAL MEDICINE & REHABILITATION
Payer: MEDICARE

## 2019-03-25 ENCOUNTER — HOSPITAL ENCOUNTER (OUTPATIENT)
Age: 69
Setting detail: OUTPATIENT SURGERY
Discharge: HOME | End: 2019-03-25
Attending: PHYSICAL MEDICINE & REHABILITATION | Admitting: PHYSICAL MEDICINE & REHABILITATION
Payer: MEDICARE

## 2019-03-25 ENCOUNTER — TELEPHONE (OUTPATIENT)
Dept: ORTHOPEDIC SURGERY | Age: 69
End: 2019-03-25

## 2019-03-25 VITALS
SYSTOLIC BLOOD PRESSURE: 120 MMHG | BODY MASS INDEX: 30.66 KG/M2 | HEIGHT: 69 IN | TEMPERATURE: 98.1 F | RESPIRATION RATE: 16 BRPM | HEART RATE: 70 BPM | WEIGHT: 207 LBS | DIASTOLIC BLOOD PRESSURE: 72 MMHG | OXYGEN SATURATION: 100 %

## 2019-03-25 PROCEDURE — 3209999900 FLUORO FOR SURGICAL PROCEDURES

## 2019-03-25 PROCEDURE — 3610000058 HC PAIN LEVEL 5 BASE (NON-OR): Performed by: PHYSICAL MEDICINE & REHABILITATION

## 2019-03-25 PROCEDURE — 2709999900 HC NON-CHARGEABLE SUPPLY: Performed by: PHYSICAL MEDICINE & REHABILITATION

## 2019-03-25 PROCEDURE — 2500000003 HC RX 250 WO HCPCS: Performed by: PHYSICAL MEDICINE & REHABILITATION

## 2019-03-25 PROCEDURE — 99152 MOD SED SAME PHYS/QHP 5/>YRS: CPT | Performed by: PHYSICAL MEDICINE & REHABILITATION

## 2019-03-25 PROCEDURE — 6360000002 HC RX W HCPCS: Performed by: PHYSICAL MEDICINE & REHABILITATION

## 2019-03-25 RX ORDER — MIDAZOLAM HYDROCHLORIDE 1 MG/ML
INJECTION INTRAMUSCULAR; INTRAVENOUS
Status: COMPLETED | OUTPATIENT
Start: 2019-03-25 | End: 2019-03-25

## 2019-03-25 RX ORDER — BUPIVACAINE HYDROCHLORIDE 5 MG/ML
INJECTION, SOLUTION PERINEURAL
Status: COMPLETED | OUTPATIENT
Start: 2019-03-25 | End: 2019-03-25

## 2019-03-25 RX ORDER — LIDOCAINE HYDROCHLORIDE 10 MG/ML
INJECTION, SOLUTION EPIDURAL; INFILTRATION; INTRACAUDAL; PERINEURAL
Status: COMPLETED | OUTPATIENT
Start: 2019-03-25 | End: 2019-03-25

## 2019-03-25 ASSESSMENT — PAIN SCALES - GENERAL
PAINLEVEL_OUTOF10: 0
PAINLEVEL_OUTOF10: 0

## 2019-03-25 ASSESSMENT — PAIN - FUNCTIONAL ASSESSMENT: PAIN_FUNCTIONAL_ASSESSMENT: 0-10

## 2019-03-25 ASSESSMENT — PAIN DESCRIPTION - DESCRIPTORS: DESCRIPTORS: SHARP;SHOOTING

## 2019-04-01 ENCOUNTER — OFFICE VISIT (OUTPATIENT)
Dept: ORTHOPEDIC SURGERY | Age: 69
End: 2019-04-01
Payer: MEDICARE

## 2019-04-01 VITALS — BODY MASS INDEX: 30.66 KG/M2 | WEIGHT: 207.01 LBS | HEIGHT: 69 IN

## 2019-04-01 DIAGNOSIS — M43.16 SPONDYLOLISTHESIS OF LUMBAR REGION: Primary | ICD-10-CM

## 2019-04-01 DIAGNOSIS — M51.36 DDD (DEGENERATIVE DISC DISEASE), LUMBAR: ICD-10-CM

## 2019-04-01 DIAGNOSIS — M47.816 SPONDYLOSIS OF LUMBAR REGION WITHOUT MYELOPATHY OR RADICULOPATHY: ICD-10-CM

## 2019-04-01 PROCEDURE — G8427 DOCREV CUR MEDS BY ELIG CLIN: HCPCS | Performed by: PHYSICAL MEDICINE & REHABILITATION

## 2019-04-01 PROCEDURE — 1123F ACP DISCUSS/DSCN MKR DOCD: CPT | Performed by: PHYSICAL MEDICINE & REHABILITATION

## 2019-04-01 PROCEDURE — 99213 OFFICE O/P EST LOW 20 MIN: CPT | Performed by: PHYSICAL MEDICINE & REHABILITATION

## 2019-04-01 PROCEDURE — 1036F TOBACCO NON-USER: CPT | Performed by: PHYSICAL MEDICINE & REHABILITATION

## 2019-04-01 PROCEDURE — 4040F PNEUMOC VAC/ADMIN/RCVD: CPT | Performed by: PHYSICAL MEDICINE & REHABILITATION

## 2019-04-01 PROCEDURE — G8417 CALC BMI ABV UP PARAM F/U: HCPCS | Performed by: PHYSICAL MEDICINE & REHABILITATION

## 2019-04-01 PROCEDURE — 3017F COLORECTAL CA SCREEN DOC REV: CPT | Performed by: PHYSICAL MEDICINE & REHABILITATION

## 2019-04-01 NOTE — PROGRESS NOTES
Follow up: Dianna Griffith  1950  [id number]      CHIEF COMPLAINT:    Chief Complaint   Patient presents with    Back Pain     FU MBB B L3, L4, L5  3/25         HISTORY OF PRESENT ILLNESS:  Mr. Neli Kelley is a 70 y.o. male returns for a follow up visit for multiple medical problems. His current presenting problems are   1. Spondylolisthesis of lumbar region    2. Spondylosis of lumbar region without myelopathy or radiculopathy    3. DDD (degenerative disc disease), lumbar    . As per information/history obtained from the PADT(patient assessment and documentation tool) - He complains of pain in the lower back with radiation to the hips Bilateral He rates the pain 1/10 and describes it as sharp, piercing. Pain is made worse by: bending, driving. He denies side effects from the current pain regimen. Patient reports that since the last follow up visit the physical functioning is better, family/social relationships are better, mood is better and sleep patterns are better, and that the overall functioning is better. Patient denies neurological bowel or bladder. Patient returns today to follow up after MBB. He states he really only has pain when driving longer than 30 minutes and when he goes to stand up he has stiffness. He also complains of pain when standing up straight from bending over. He has noticed he has had to take less ibuprofen and tylenol.     Associated signs and symptoms:   Neurogenic bowel or bladder symptoms:  no   Perceived weakness:  no   Difficulty walking:  no              Past Medical History:   Past Medical History:   Diagnosis Date    [facility]ism ([facility] [address]     Anxiety     Basal cell cancer 8/2010    Dr. Colon Basil Depression     DVT, lower extremity ([facility] [address] 5/29/2015    Right, gastroc, soleal    Hyperlipidemia     Osteopenia     Squamous cell skin cancer 2017    right ear lobe    Stasis dermatitis     Varicose veins of both lower extremities     Venous insufficiency of both lower extremities       Past Surgical History:     Past Surgical History:   Procedure Laterality Date    ANESTHESIA NERVE BLOCK Bilateral 3/25/2019    BILATERAL L3,L4,L5 DR MEDIAL BRANCH BLOCKS WITH FLUOROSCOPY performed by Randall Wise MD at [address]      from [address] [facility] Bilateral 3/4/2019    BILATERAL L3, L4, L5 DORSAL RAMUS LUMBAR MEDIAL BRANCH BLOCK WITH FLUOROSCOPY performed by Randall Wise MD at [address] [facility]  8/2010    SKIN CANCER EXCISION Right 2017    [facility]. Current Medications:     Current Outpatient Medications:     fenofibrate 160 MG tablet, Take 1 tablet by mouth daily, Disp: 90 tablet, Rfl: 3    metoprolol succinate (TOPROL XL) 100 MG extended release tablet, TAKE 1 TABLET BY MOUTH DAILY, Disp: 90 tablet, Rfl: 3    clomiPRAMINE (ANAFRANIL) 75 MG capsule, TAKE 2 CAPSULES BY MOUTH NIGHTLY, Disp: 180 capsule, Rfl: 3    fenofibrate micronized (LOFIBRA) 134 MG capsule, TAKE ONE CAPSULE BY MOUTH EVERY DAY, Disp: 90 capsule, Rfl: 3    aspirin 81 MG tablet, Take 81 mg by mouth daily, Disp: , Rfl:     Cholecalciferol (VITAMIN D3) 2000 UNITS CAPS, Take 1 capsule by mouth daily, Disp: , Rfl:   Allergies:  Patient has no known allergies. Social History:    reports that he has never smoked. He has never used smokeless tobacco. He reports that he does not drink alcohol or use drugs.   Family History:   Family History   Problem Relation Age of Onset    High Blood Pressure Mother     Heart Disease Mother         a fib    Heart Disease Father     Diabetes Father        REVIEW OF SYSTEMS:   CONSTITUTIONAL: Denies unexplained weight loss, fevers, chills or fatigue  NEUROLOGICAL: Denies unsteady gait or progressive weakness  MUSCULOSKELETAL: Denies joint swelling or redness  GI: Denies nausea, vomiting, diarrhea   : Denies bowel or bladder issues       PHYSICAL EXAM:    Vitals: Height 5' 9.02\" (1.753 m), weight 207 lb 0.2 oz (93.9 kg). GENERAL EXAM:  · General Apparence: Patient is adequately groomed with no evidence of malnutrition. · Psychiatric: Orientation: The patient is oriented to time, place and person. The patient's mood and affect are appropriate   · Vascular: Examination reveals no swelling and palpation reveals no tenderness in upper or lower extremities. Good capillary refill. · Sensation is intact without deficit in the upper and lower extremities to light touch and pinprick  · Coordination of the upper and lower extremities are normal.    LUMBAR/SACRAL EXAMINATION:  · Inspection: Local inspection shows no step-off or bruising. Lumbar alignment is normal. No instability is noted. · Palpation:   No evidence of tenderness at the midline. Lumbar paraspinal tenderness: Mild L4/5 and L5/S1 tenderness  Bursal tenderness No tenderness bilaterally  There is no paraspinal spasm. · Range of Motion: increased pain with rotation and lateral bending bilaterally  · Strength:   Strength testing is 5/5 in all muscle groups tested. · Special Tests:   Straight leg raise and crossed SLR negative. Maurice's testing is negative bilaterally. FADIR's testing is negative bilaterally. · Skin: There are no rashes, ulcerations or lesions. · Reflexes: Reflexes are symmetrically 2+ at the patellar and ankle tendons. Clonus absent bilaterally at the feet. · Gait & station: normal, patient ambulates without assistance  · Additional Examinations:  · RIGHT LOWER EXTREMITY: Inspection/examination of the right lower extremity does not show any tenderness, deformity or injury. Range of motion is normal and pain-free. There is no gross instability. There are no rashes, ulcerations or lesions. Strength and tone are normal. No atrophy or abnormal movements are noted. · LEFT LOWER EXTREMITY:  Inspection/examination of the left lower extremity does not show any tenderness, deformity or injury. Range of motion is normal and pain-free.  There is no gross instability. There are no rashes, ulcerations or lesions. Strength and tone are normal. No atrophy or abnormal movements are noted. Diagnostic Testing:    MR Lumbar spine shows  2/14/19  Grade 2 anterolisthesis of L5.       Degenerative changes most significant at L4-5 and L5-S1.        Results for orders placed or performed in visit on 02/08/19   CBC Auto Differential   Result Value Ref Range    WBC 12.2 (H) 4.0 - 11.0 K/uL    RBC 4.12 (L) 4.20 - 5.90 M/uL    Hemoglobin 13.4 (L) 13.5 - 17.5 g/dL    Hematocrit 42.2 40.5 - 52.5 %    .3 (H) 80.0 - 100.0 fL    MCH 32.5 26.0 - 34.0 pg    MCHC 31.8 31.0 - 36.0 g/dL    RDW 13.7 12.4 - 15.4 %    Platelets 457 397 - 219 K/uL    MPV 8.0 5.0 - 10.5 fL    Neutrophils % 70.4 %    Lymphocytes % 19.0 %    Monocytes % 6.1 %    Eosinophils % 3.7 %    Basophils % 0.8 %    Neutrophils # 8.6 (H) 1.7 - 7.7 K/uL    Lymphocytes # 2.3 1.0 - 5.1 K/uL    Monocytes # 0.7 0.0 - 1.3 K/uL    Eosinophils # 0.5 0.0 - 0.6 K/uL    Basophils # 0.1 0.0 - 0.2 K/uL   Comprehensive Metabolic Panel   Result Value Ref Range    Sodium 143 136 - 145 mmol/L    Potassium 4.8 3.5 - 5.1 mmol/L    Chloride 104 99 - 110 mmol/L    CO2 24 21 - 32 mmol/L    Anion Gap 15 3 - 16    Glucose 97 70 - 99 mg/dL    BUN 19 7 - 20 mg/dL    CREATININE 1.0 0.8 - 1.3 mg/dL    GFR Non-African American >60 >60    GFR African American >60 >60    Calcium 9.5 8.3 - 10.6 mg/dL    Total Protein 7.1 6.4 - 8.2 g/dL    Alb 4.4 3.4 - 5.0 g/dL    Albumin/Globulin Ratio 1.6 1.1 - 2.2    Total Bilirubin 0.3 0.0 - 1.0 mg/dL    Alkaline Phosphatase 68 40 - 129 U/L    ALT 18 10 - 40 U/L    AST 19 15 - 37 U/L    Globulin 2.7 g/dL   Lipid Panel   Result Value Ref Range    Cholesterol, Total 138 0 - 199 mg/dL    Triglycerides 165 (H) 0 - 150 mg/dL    HDL 40 40 - 60 mg/dL    LDL Calculated 65 <100 mg/dL    VLDL Cholesterol Calculated 33 Not Established mg/dL   TSH without Reflex   Result Value Ref Range    TSH 3.55 0.27 - 4.20 uIU/mL   Psa screening   Result Value Ref Range    PSA 0.22 0.00 - 4.00 ng/mL     Impression:       1. Spondylolisthesis of lumbar region    2. Spondylosis of lumbar region without myelopathy or radiculopathy    3. DDD (degenerative disc disease), lumbar        Plan:  Clinical Course: Above diagnoses are improving     I discussed the diagnosis and the treatment options with Laurie Trejo today. In Summary:  The various treatment options were outlined and discussed with Laurie Trejo including:  Conservative care options: physical therapy, ice, medications, bracing, and activity modification. The indications for therapeutic injections. The indications for additional imaging/laboratory studies. The indications for (possible future) interventions. After considering the various options discussed, Laurie Trejo elected to pursue a course of treatment that includes the followin. Medications:  Continue anti-inflammatories with appropriate GI Precautions including to stop if develop dark tarry stools or GI upset and to take with food. 2. PT:  Encouraged to continue with HEP. 3. Further studies:  No further studies. 4. Interventional:  RFA bilateral L3, L4 and L5 DR when symptoms worsen in the future    5. Follow up:  4-6 weeks      Laurie Trejo was instructed to call the office if his symptoms worsen or if new symptoms appear prior to the next scheduled visit. He is specifically instructed to contact the office between now & his scheduled appointment if he has concerns related to his condition or if he needs assistance in scheduling the above tests. He is welcome to call for an appointment sooner if he has any additional concerns or questions. I, Marcellus Holter, am scribing for and in the presence of Dr. Wilberto Ramos. 19 12:52 PM  Marcellus Holter, ATC, Dedra Dupree, Dr. Gladys García.  Nelson, personally performed the services described in this documentation as scribed by Marcellus Holter, AT in my presence

## 2019-05-03 RX ORDER — METOPROLOL SUCCINATE 100 MG/1
TABLET, EXTENDED RELEASE ORAL
Qty: 90 TABLET | Refills: 3 | Status: SHIPPED | OUTPATIENT
Start: 2019-05-03 | End: 2020-02-10 | Stop reason: DRUGHIGH

## 2019-05-03 RX ORDER — FENOFIBRATE 134 MG/1
CAPSULE ORAL
Qty: 90 CAPSULE | Refills: 3 | Status: SHIPPED | OUTPATIENT
Start: 2019-05-03 | End: 2019-05-10 | Stop reason: SDUPTHER

## 2019-05-03 RX ORDER — CLOMIPRAMINE HYDROCHLORIDE 75 MG/1
CAPSULE ORAL
Qty: 180 CAPSULE | Refills: 3 | Status: SHIPPED | OUTPATIENT
Start: 2019-05-03 | End: 2020-02-11 | Stop reason: DRUGHIGH

## 2019-05-10 RX ORDER — FENOFIBRATE 134 MG/1
CAPSULE ORAL
Qty: 90 CAPSULE | Refills: 3 | Status: SHIPPED | OUTPATIENT
Start: 2019-05-10 | End: 2019-05-16 | Stop reason: ALTCHOICE

## 2019-05-13 DIAGNOSIS — E78.5 DYSLIPIDEMIA: ICD-10-CM

## 2019-05-13 RX ORDER — FENOFIBRATE 160 MG/1
160 TABLET ORAL DAILY
Qty: 30 TABLET | Refills: 0 | Status: SHIPPED | OUTPATIENT
Start: 2019-05-13 | End: 2019-05-14 | Stop reason: SDUPTHER

## 2019-05-14 DIAGNOSIS — E78.5 DYSLIPIDEMIA: ICD-10-CM

## 2019-05-14 RX ORDER — FENOFIBRATE 160 MG/1
160 TABLET ORAL DAILY
Qty: 90 TABLET | Refills: 3 | Status: SHIPPED | OUTPATIENT
Start: 2019-05-14 | End: 2019-05-16 | Stop reason: CLARIF

## 2019-05-16 ENCOUNTER — TELEPHONE (OUTPATIENT)
Dept: INTERNAL MEDICINE CLINIC | Age: 69
End: 2019-05-16

## 2019-05-16 DIAGNOSIS — E78.5 DYSLIPIDEMIA: ICD-10-CM

## 2019-05-16 RX ORDER — FENOFIBRATE 160 MG/1
160 TABLET ORAL DAILY
Qty: 90 TABLET | Refills: 1 | Status: CANCELLED | OUTPATIENT
Start: 2019-05-16

## 2019-05-16 NOTE — TELEPHONE ENCOUNTER
Patient called because he keeps getting messages from his insurance company that his Fenofibrate Micronized 134 capsule is being rejected by [employer]. Please check into this matter for patient and let him know. He said if Dr. Kirti Francis needs to change to something different that is fine. He has not been able to take for a few days due to this delay. Please call him at number provided with any updates. Thanks.

## 2019-05-16 NOTE — TELEPHONE ENCOUNTER
pts insurance will only cover Fenofibrate 160mg. Pt has a 30 day supply at [facility]. Next refill due date from [facility] due 6/3/19. Pt has refills.   He just needs to call [facility] and request next month

## 2019-05-24 ENCOUNTER — OFFICE VISIT (OUTPATIENT)
Dept: ORTHOPEDIC SURGERY | Age: 69
End: 2019-05-24
Payer: MEDICARE

## 2019-05-24 VITALS — SYSTOLIC BLOOD PRESSURE: 127 MMHG | HEART RATE: 89 BPM | DIASTOLIC BLOOD PRESSURE: 80 MMHG

## 2019-05-24 DIAGNOSIS — M43.16 SPONDYLOLISTHESIS OF LUMBAR REGION: ICD-10-CM

## 2019-05-24 DIAGNOSIS — M47.816 SPONDYLOSIS OF LUMBAR REGION WITHOUT MYELOPATHY OR RADICULOPATHY: Primary | ICD-10-CM

## 2019-05-24 DIAGNOSIS — M51.36 DDD (DEGENERATIVE DISC DISEASE), LUMBAR: ICD-10-CM

## 2019-05-24 PROCEDURE — 1123F ACP DISCUSS/DSCN MKR DOCD: CPT | Performed by: PHYSICAL MEDICINE & REHABILITATION

## 2019-05-24 PROCEDURE — 3017F COLORECTAL CA SCREEN DOC REV: CPT | Performed by: PHYSICAL MEDICINE & REHABILITATION

## 2019-05-24 PROCEDURE — 1036F TOBACCO NON-USER: CPT | Performed by: PHYSICAL MEDICINE & REHABILITATION

## 2019-05-24 PROCEDURE — G8427 DOCREV CUR MEDS BY ELIG CLIN: HCPCS | Performed by: PHYSICAL MEDICINE & REHABILITATION

## 2019-05-24 PROCEDURE — 4040F PNEUMOC VAC/ADMIN/RCVD: CPT | Performed by: PHYSICAL MEDICINE & REHABILITATION

## 2019-05-24 PROCEDURE — 99213 OFFICE O/P EST LOW 20 MIN: CPT | Performed by: PHYSICAL MEDICINE & REHABILITATION

## 2019-05-24 PROCEDURE — G8417 CALC BMI ABV UP PARAM F/U: HCPCS | Performed by: PHYSICAL MEDICINE & REHABILITATION

## 2019-05-24 NOTE — PROGRESS NOTES
Follow up: Dann Paris  1950  [id number]         Chief Complaint   Patient presents with    Lower Back Pain         HISTORY OF PRESENT ILLNESS:  Mr. Irene Brown is a 70 y.o. male returns for a follow up visit for multiple medical problems. His current presenting problems are   1. Spondylosis of lumbar region without myelopathy or radiculopathy    2. Spondylolisthesis of lumbar region    3. DDD (degenerative disc disease), lumbar    . As per information/history obtained from the PADT(patient assessment and documentation tool) - He complains of pain in the lower back He rates the pain 1-9/10 and describes it as sharp, aching, stabbing. Pain is made worse by: standing, sitting, bending. He denies side effects from the current pain regimen. Patient reports that since the last follow up visit the physical functioning is unchanged, family/social relationships are unchanged, mood is unchanged and sleep patterns are unchanged, and that the overall functioning is unchanged. Patient denies neurological bowel or bladder. Patient in office for lower back pain. Patient states that his pain level today is a 1/10. Patient describes his pain as a sharp, stabbing, aching pain. He states that his pain is more on the right side. He states that sitting, standing, and bending makes his pain worse. Patient denies any injuries or triggers. Reports when he changes positions or goes from a sit to stand has severe back pain. At that point his pain goes up to 9 out of 10. Denies having any paresthesia into lower extremities. He is worried about going on a trip to [place].     Associated signs and symptoms:   Neurogenic bowel or bladder symptoms:  no   Perceived weakness:  no   Difficulty walking:  no              Past Medical History:   Past Medical History:   Diagnosis Date    [facility]ism ([facility] [address]     Anxiety     Basal cell cancer 8/2010    Dr. Maksim Garcia Depression     DVT, lower extremity ([facility] [address] 5/29/2015    Right, 80.    GENERAL EXAM:  · General Apparence: Patient is adequately groomed with no evidence of malnutrition. · Psychiatric: Orientation: The patient is oriented to time, place and person. The patient's mood and affect are appropriate   · Vascular: Examination reveals no swelling and palpation reveals no tenderness in upper or lower extremities. Good capillary refill. · The lymphatic examination of the neck, axillae and groin reveals all areas to be without enlargement or induration  · Sensation is intact without deficit in the upper and lower extremities to light touch and pinprick  · Coordination of the upper and lower extremities are normal.  · Additional Examinations:  · RIGHT UPPER EXTREMITY:  Inspection/examination of the right upper extremity does not show any tenderness, deformity or injury. Range of motion is unremarkable and pain-free. There is no gross instability. There are no rashes, ulcerations or lesions. Strength and tone are normal. No atrophy or abnormal movements are noted. · LEFT UPPER EXTREMITY: Inspection/examination of the left upper extremity does not show any tenderness, deformity or injury. Range of motion is unremarkable and pain-free. There is no gross instability. There are no rashes, ulcerations or lesions. Strength and tone are normal. No atrophy or abnormal movements are noted. LUMBAR/SACRAL EXAMINATION:  · Inspection: Local inspection shows no step-off or bruising. Lumbar alignment is normal. No instability is noted. · Palpation:   No evidence of tenderness at the midline. Lumbar paraspinal tenderness: Mild L4/5 and L5/S1 tenderness  Bursal tenderness No tenderness bilaterally  There is no paraspinal spasm. · Range of Motion: Increased pain with rotation and lateral bending bilaterally  · Strength:   Strength testing is 5/5 in all muscle groups tested. · Special Tests:   Straight leg raise and crossed SLR negative. Maurice's testing is negative bilaterally.  FADIR's testing is negative bilaterally. · Skin: There are no rashes, ulcerations or lesions. · Reflexes: Reflexes are symmetrically 2+ at the patellar and ankle tendons. Clonus absent bilaterally at the feet. · Gait & station: normal, patient ambulates without assistance  · Additional Examinations:  · RIGHT LOWER EXTREMITY: Inspection/examination of the right lower extremity does not show any tenderness, deformity or injury. Range of motion is normal and pain-free. There is no gross instability. There are no rashes, ulcerations or lesions. Strength and tone are normal. No atrophy or abnormal movements are noted. · LEFT LOWER EXTREMITY:  Inspection/examination of the left lower extremity does not show any tenderness, deformity or injury. Range of motion is normal and pain-free. There is no gross instability. There are no rashes, ulcerations or lesions. Strength and tone are normal. No atrophy or abnormal movements are noted.       Diagnostic Testing:    MR Lumbar spine shows  02/14/2019  Impression       Grade 2 anterolisthesis of L5.       Degenerative changes most significant at L4-5 and L5-S1.           Results for orders placed or performed in visit on 02/08/19   CBC Auto Differential   Result Value Ref Range    WBC 12.2 (H) 4.0 - 11.0 K/uL    RBC 4.12 (L) 4.20 - 5.90 M/uL    Hemoglobin 13.4 (L) 13.5 - 17.5 g/dL    Hematocrit 42.2 40.5 - 52.5 %    .3 (H) 80.0 - 100.0 fL    MCH 32.5 26.0 - 34.0 pg    MCHC 31.8 31.0 - 36.0 g/dL    RDW 13.7 12.4 - 15.4 %    Platelets 633 431 - 177 K/uL    MPV 8.0 5.0 - 10.5 fL    Neutrophils % 70.4 %    Lymphocytes % 19.0 %    Monocytes % 6.1 %    Eosinophils % 3.7 %    Basophils % 0.8 %    Neutrophils # 8.6 (H) 1.7 - 7.7 K/uL    Lymphocytes # 2.3 1.0 - 5.1 K/uL    Monocytes # 0.7 0.0 - 1.3 K/uL    Eosinophils # 0.5 0.0 - 0.6 K/uL    Basophils # 0.1 0.0 - 0.2 K/uL   Comprehensive Metabolic Panel   Result Value Ref Range    Sodium 143 136 - 145 mmol/L    Potassium 4.8 3.5 - 5.1 mmol/L Chloride 104 99 - 110 mmol/L    CO2 24 21 - 32 mmol/L    Anion Gap 15 3 - 16    Glucose 97 70 - 99 mg/dL    BUN 19 7 - 20 mg/dL    CREATININE 1.0 0.8 - 1.3 mg/dL    GFR Non-African American >60 >60    GFR African American >60 >60    Calcium 9.5 8.3 - 10.6 mg/dL    Total Protein 7.1 6.4 - 8.2 g/dL    Alb 4.4 3.4 - 5.0 g/dL    Albumin/Globulin Ratio 1.6 1.1 - 2.2    Total Bilirubin 0.3 0.0 - 1.0 mg/dL    Alkaline Phosphatase 68 40 - 129 U/L    ALT 18 10 - 40 U/L    AST 19 15 - 37 U/L    Globulin 2.7 g/dL   Lipid Panel   Result Value Ref Range    Cholesterol, Total 138 0 - 199 mg/dL    Triglycerides 165 (H) 0 - 150 mg/dL    HDL 40 40 - 60 mg/dL    LDL Calculated 65 <100 mg/dL    VLDL Cholesterol Calculated 33 Not Established mg/dL   TSH without Reflex   Result Value Ref Range    TSH 3.55 0.27 - 4.20 uIU/mL   Psa screening   Result Value Ref Range    PSA 0.22 0.00 - 4.00 ng/mL     Impression:       1. Spondylosis of lumbar region without myelopathy or radiculopathy    2. Spondylolisthesis of lumbar region    3. DDD (degenerative disc disease), lumbar        Plan:  Clinical Course: Above diagnoses are worsening    I discussed the diagnosis and the treatment options with Jenni Seguran today. In Summary:  The various treatment options were outlined and discussed with Jennisaranya Cifuentesoln including:  Conservative care options: physical therapy, ice, medications, bracing, and activity modification. The indications for therapeutic injections. The indications for additional imaging/laboratory studies. The indications for (possible future) interventions. After considering the various options discussed, Jenni Cifuentesoln elected to pursue a course of treatment that includes the followin. Medications:  Continue anti-inflammatories with appropriate GI Precautions including to stop if develop dark tarry stools or GI upset and to take with food. 2. PT:  Encouraged to continue with HEP.     3. Further studies:  No further

## 2019-05-24 NOTE — LETTER
Please schedule the following with:     Date:   6/3/2019 @ 2:00   Account: [de-identified]  Patient: Bruno Soni    : 1950  Address:  [address] [place] [address]    Phone (H):  [phone] (home) [phone] (work)     ----------------------------------------------------------------------------------------------  Diagnosis:     ICD-10-CM    1. Spondylosis of lumbar region without myelopathy or radiculopathy M47.816    2. Spondylolisthesis of lumbar region M43.16    3. DDD (degenerative disc disease), lumbar M51.36          Levels:L3, L4, L5 DR  Procedure type NEUROTOMY  Side BILATERAL  CPT Codes 65481, 77896    ----------------------------------------------------------------------------------------------  Injection #   [id number]    Attending Physician       John Barajas.  Deya Swenson MD.      ----------------------------------------------------------------------------------------------  Injection Scheduled For:    At:    [facility]    Pre-Cert#    [facility]    Pre-Cert#    Comments or Special instructions:    · Infection control  · Tested positive for MRSA in past 12 months:  no  · Tested positive for MSSA \"staph infection\" in past 12 months: no  · Tested positive for VRE (Vancomycin Resistant Enterococci) in past 12 months:   no  · Currently on any antibiotics for an infection: no  · Anticoagulants:  · On a blood thinner:  ASA  · Any history of bleeding disorder: no   · Advanced Liver disease: no   · Advanced Renal disease: no   · Glaucoma: no   · Diabetes: no     Sedation:  Yes  -----------------------------------------------------------------------------------------------  No Known Allergies

## 2019-05-28 ENCOUNTER — TELEPHONE (OUTPATIENT)
Dept: ORTHOPEDIC SURGERY | Age: 69
End: 2019-05-28

## 2019-05-28 NOTE — TELEPHONE ENCOUNTER
Patient needs scheduled for an injection. x1 (RFA  DR FINNEGAN L3-L4, L5)    Diabetes: NO  Glaucoma: NO  Blood Thinner: ASA  Bleeding D/O: NO  Current infx/antibiotic: NO  MRSA/MSSA/VRE: NO    PLEASE CALL PATIENT TO SCHEDULE. UNSURE IF PATIENT WAS GIVEN PRE-PROCEDURE SHEET.

## 2019-05-29 ENCOUNTER — TELEPHONE (OUTPATIENT)
Dept: ORTHOPEDIC SURGERY | Age: 69
End: 2019-05-29

## 2019-05-29 NOTE — PROGRESS NOTES
PATIENT REACHED   YES____NO_X___    PREOP INSTUCTIONS LEFT ON VM [id number]__[phone]_____________      DATE__6/3/19_______ TIME__1400_______ARRIVAL_1300_______PLACE_[facility]___________  NOTHING TO EAT OR DRINK  6 HOURS PRIOR TO PROCEDURE START TIME  YOU NEED A RESPONSIBLE ADULT AGE 18 OR OLDER TO DRIVE YOU HOME  PLEASE BRING INSURANCE CARD. PICTURE ID AND COMPLETE LIST OF MEDS  WEAR LOOSE COMFORTABLE CLOTHING  FOLLOW ANY INSTRUCTIONS YOUR DRS OFFICE HAS GIVEN YOU,INCLUDING WHAT MEDICATIONS TO TAKE THE AM OF PROCEDURE AND WHEN AND IF YOU NEED TO STOP ANY BLOOD THINNERS. IF YOU HAVE QUESTIONS REGARDING THIS CALL THE OFFICE  THE GOAL BLOOD SUGAR THE AM OF PROCEDURE  OR LESS ABOVE THAT THE PROCEDURE MAY BE CANCELLED  ANY QUESTIONS CALL YOUR DOCTOR. ALSO,PLEASE READ THE INSTRUCTION PACKET FROM YOUR DR IF YOU RECEIVED ONE.   SPINE INTERVENTION NUMBER [phone]

## 2019-05-29 NOTE — TELEPHONE ENCOUNTER
Auth: NPR  Date: 6/3/19  CPT: 07604, 59814, 48 Modifier, 68965 26, 80874  DX: M47.816, M43.16, M51.36   Outpatient  Procedure: Neurotomy  SX Location: [facility]   Insurance: Medicare  Physician: JESU

## 2019-06-03 ENCOUNTER — APPOINTMENT (OUTPATIENT)
Dept: GENERAL RADIOLOGY | Age: 69
End: 2019-06-03
Attending: PHYSICAL MEDICINE & REHABILITATION
Payer: MEDICARE

## 2019-06-03 ENCOUNTER — HOSPITAL ENCOUNTER (OUTPATIENT)
Age: 69
Setting detail: OUTPATIENT SURGERY
Discharge: HOME HEALTH | End: 2019-06-03
Attending: PHYSICAL MEDICINE & REHABILITATION | Admitting: PHYSICAL MEDICINE & REHABILITATION
Payer: MEDICARE

## 2019-06-03 VITALS
RESPIRATION RATE: 16 BRPM | OXYGEN SATURATION: 99 % | SYSTOLIC BLOOD PRESSURE: 123 MMHG | TEMPERATURE: 97.9 F | DIASTOLIC BLOOD PRESSURE: 81 MMHG | HEART RATE: 72 BPM

## 2019-06-03 PROCEDURE — 99152 MOD SED SAME PHYS/QHP 5/>YRS: CPT | Performed by: PHYSICAL MEDICINE & REHABILITATION

## 2019-06-03 PROCEDURE — 2500000003 HC RX 250 WO HCPCS: Performed by: PHYSICAL MEDICINE & REHABILITATION

## 2019-06-03 PROCEDURE — 3610000059 HC PAIN LEVEL 5 ADDL 15 MIN (NON-OR): Performed by: PHYSICAL MEDICINE & REHABILITATION

## 2019-06-03 PROCEDURE — 2709999900 HC NON-CHARGEABLE SUPPLY: Performed by: PHYSICAL MEDICINE & REHABILITATION

## 2019-06-03 PROCEDURE — 99153 MOD SED SAME PHYS/QHP EA: CPT | Performed by: PHYSICAL MEDICINE & REHABILITATION

## 2019-06-03 PROCEDURE — 3610000058 HC PAIN LEVEL 5 BASE (NON-OR): Performed by: PHYSICAL MEDICINE & REHABILITATION

## 2019-06-03 PROCEDURE — 6360000002 HC RX W HCPCS: Performed by: PHYSICAL MEDICINE & REHABILITATION

## 2019-06-03 PROCEDURE — 3209999900 FLUORO FOR SURGICAL PROCEDURES

## 2019-06-03 RX ORDER — FENTANYL CITRATE 50 UG/ML
INJECTION, SOLUTION INTRAMUSCULAR; INTRAVENOUS
Status: COMPLETED | OUTPATIENT
Start: 2019-06-03 | End: 2019-06-03

## 2019-06-03 RX ORDER — MIDAZOLAM HYDROCHLORIDE 1 MG/ML
INJECTION INTRAMUSCULAR; INTRAVENOUS
Status: COMPLETED | OUTPATIENT
Start: 2019-06-03 | End: 2019-06-03

## 2019-06-03 RX ORDER — METHYLPREDNISOLONE ACETATE 80 MG/ML
INJECTION, SUSPENSION INTRA-ARTICULAR; INTRALESIONAL; INTRAMUSCULAR; SOFT TISSUE
Status: COMPLETED | OUTPATIENT
Start: 2019-06-03 | End: 2019-06-03

## 2019-06-03 RX ORDER — LIDOCAINE HYDROCHLORIDE 10 MG/ML
INJECTION, SOLUTION EPIDURAL; INFILTRATION; INTRACAUDAL; PERINEURAL
Status: COMPLETED | OUTPATIENT
Start: 2019-06-03 | End: 2019-06-03

## 2019-06-03 RX ORDER — LIDOCAINE HYDROCHLORIDE 20 MG/ML
INJECTION, SOLUTION EPIDURAL; INFILTRATION; INTRACAUDAL; PERINEURAL
Status: COMPLETED | OUTPATIENT
Start: 2019-06-03 | End: 2019-06-03

## 2019-06-03 RX ORDER — BUPIVACAINE HYDROCHLORIDE 5 MG/ML
INJECTION, SOLUTION PERINEURAL
Status: COMPLETED | OUTPATIENT
Start: 2019-06-03 | End: 2019-06-03

## 2019-06-03 ASSESSMENT — PAIN SCALES - GENERAL: PAINLEVEL_OUTOF10: 2

## 2019-06-03 ASSESSMENT — PAIN - FUNCTIONAL ASSESSMENT: PAIN_FUNCTIONAL_ASSESSMENT: 0-10

## 2019-06-03 NOTE — OP NOTE
Patient:  Alice Roth  YOB: 1950  Medical Record #:  [id number]   Place:   [address] [place] [address]  Date:  6/3/2019   Physician:  Rodrigo Rosa MD, JORDY    Procedure: Radiofrequency ablation of the Bilateral L3, L4 Medial branches and L5 Dorsal ramus     Pre-Procedure Diagnosis: Lumbar spondylosis without radiculopathy     Post-Procedure Diagnosis: Same     Sedation: Local with 1% Lidocaine 5 ml and 2 mg of IV Versed and 50 mcg of IV Fentanyl     EBL: None     Complications: None     Procedure Summary:     The patient was seen in the office for complaints of low back pain. Review of the imaging and physical exam of the patient confirmed the pre-procedure diagnosis. After a thorough discussion of risks, benefits and alternatives informed consent was obtained. The patient was brought to the procedure suite and placed in the prone position. The skin overlying the lumbar spine was prepped with chloraprep and draped in the usual sterile fashion. Using fluoroscopic guidance, the right L4, L5 and sacral ala levels were identified. Through anesthetized skin, a 20 gauge 10 mm RFL needle with a 10 mm active tip was advanced to the juncture of the superior articular process and the transverse process at the right L4 level where the right L3 medial branch resides. After the probe was instilled, motor testing took place up to 2 V without any paresthesia into the right leg. Then ablation took place at 80 C for 90 seconds. This was repeated for the right L5 and sacral ala levels corresponding to the right L4 medial branches and L5 Dorsal ramus. The identical procedure was repeated on the left side. Following the ablation, 80 mg of depomedrol mixed with 0.5% Marcaine was instilled in 1/6 aliquots.  The needles were removed and a band-aid was applied at each level.     The patient was transferred to the post-operative area in stable condition.

## 2019-06-03 NOTE — H&P
HISTORY AND PHYSICAL/PRE-SEDATION ASSESSMENT    Patient:  Yaakov Pa   :  1950  Medical Record No.:  [id number]   Date:  6/3/2019  Physician:  Brooklynn Johnson M.D. Facility: [address] [place] [address]    HISTORY OF PRESENT ILLNESS:                 The patient is a 70 y.o. male whom presents with low back pain. Review of the imaging and physical exam of the patient confirmed the pre-procedure diagnosis. After a thorough discussion of risks, benefits and alternatives informed consent was obtained. Past Medical History:   Past Medical History:   Diagnosis Date    Alcoholism ([facility]     Anxiety     Basal cell cancer 2010    Dr. Matthew Harden Depression     DVT, lower extremity ([facility] 2015    Right, gastroc, soleal    Hyperlipidemia     Osteopenia     Squamous cell skin cancer 2017    right ear lobe    Stasis dermatitis     Varicose veins of both lower extremities     Venous insufficiency of both lower extremities       Past Surgical History:     Past Surgical History:   Procedure Laterality Date    ANESTHESIA NERVE BLOCK Bilateral 3/25/2019    BILATERAL L3,L4,L5 DR MEDIAL BRANCH BLOCKS WITH FLUOROSCOPY performed by Brooklynn Johnson MD at [address] [facility]      from [facility] Bilateral 3/4/2019    BILATERAL L3, L4, L5 DORSAL RAMUS LUMBAR MEDIAL BRANCH BLOCK WITH FLUOROSCOPY performed by Brooklynn Johnson MD at [address] [facility]  2010    SKIN CANCER EXCISION Right     [facility]. Current Medications:   Prior to Admission medications    Medication Sig Start Date End Date Taking?  Authorizing Provider   metoprolol succinate (TOPROL XL) 100 MG extended release tablet TAKE 1 TABLET BY MOUTH DAILY 5/3/19  Yes Keith Valenzuela DO   clomiPRAMINE (ANAFRANIL) 75 MG capsule TAKE 2 CAPSULES BY MOUTH NIGHTLY 5/3/19  Yes Keith Valenzuela DO   aspirin 81 MG tablet Take 81 mg by mouth daily    Historical Provider, MD Cholecalciferol (VITAMIN D3) 2000 UNITS CAPS Take 1 capsule by mouth daily    Historical Provider, MD     Allergies:  Patient has no known allergies. Social History:    reports that he has never smoked. He has never used smokeless tobacco. He reports that he does not drink alcohol or use drugs. Family History:   Family History   Problem Relation Age of Onset    High Blood Pressure Mother     Heart Disease Mother         a fib    Heart Disease Father     Diabetes Father        Vitals: Blood pressure (!) 141/80, pulse 71, temperature 97.9 °F (36.6 °C), resp. rate 16, SpO2 98 %. PHYSICAL EXAM:including affected areas  HENT: Airway patent and reviewed  Cardiovascular: Normal rate, regular rhythm, normal heart sounds. Pulmonary/Chest: No wheezes. No rhonchi. No rales. Abdominal: Soft. Bowel sounds are normal. No distension. Extremities: Moves all extremities equally  Cervical and Lumbar Spine: Painful range of motion, no midline tenderness       Diagnosis:Lumbar spondylosis without radiculopathy  M47.816   M43.16  M51.36    Plan: Proceed with planned procedure      ASA CLASS:         []   I. Normal, healthy adult           [x]   II.  Mild systemic disease            []   III. Severe systemic disease      Mallampati: Mallampati Class II - (soft palate, fauces & uvula are visible)      Sedation plan:   [x]  Local              []  Minimal                  []  General anesthesia    Patient's condition acceptable for planned procedure/sedation. Post Procedure Plan   Return to same level of care   ______________________     The risks and benefits as well as alternatives to the procedure have been discussed with the patient and or family. The patient and or next of kin understands and agrees to proceed.     Mati Enrique M.D.

## 2019-06-28 ENCOUNTER — OFFICE VISIT (OUTPATIENT)
Dept: ORTHOPEDIC SURGERY | Age: 69
End: 2019-06-28
Payer: MEDICARE

## 2019-06-28 VITALS — WEIGHT: 207.01 LBS | HEIGHT: 69 IN | BODY MASS INDEX: 30.66 KG/M2

## 2019-06-28 DIAGNOSIS — M47.816 SPONDYLOSIS WITHOUT MYELOPATHY OR RADICULOPATHY, LUMBAR REGION: Primary | ICD-10-CM

## 2019-06-28 DIAGNOSIS — M43.16 SPONDYLOLISTHESIS, LUMBAR REGION: ICD-10-CM

## 2019-06-28 DIAGNOSIS — M51.36 DDD (DEGENERATIVE DISC DISEASE), LUMBAR: ICD-10-CM

## 2019-06-28 PROCEDURE — 1123F ACP DISCUSS/DSCN MKR DOCD: CPT | Performed by: PHYSICAL MEDICINE & REHABILITATION

## 2019-06-28 PROCEDURE — G8417 CALC BMI ABV UP PARAM F/U: HCPCS | Performed by: PHYSICAL MEDICINE & REHABILITATION

## 2019-06-28 PROCEDURE — 4040F PNEUMOC VAC/ADMIN/RCVD: CPT | Performed by: PHYSICAL MEDICINE & REHABILITATION

## 2019-06-28 PROCEDURE — G8427 DOCREV CUR MEDS BY ELIG CLIN: HCPCS | Performed by: PHYSICAL MEDICINE & REHABILITATION

## 2019-06-28 PROCEDURE — 3017F COLORECTAL CA SCREEN DOC REV: CPT | Performed by: PHYSICAL MEDICINE & REHABILITATION

## 2019-06-28 PROCEDURE — 1036F TOBACCO NON-USER: CPT | Performed by: PHYSICAL MEDICINE & REHABILITATION

## 2019-06-28 PROCEDURE — 99213 OFFICE O/P EST LOW 20 MIN: CPT | Performed by: PHYSICAL MEDICINE & REHABILITATION

## 2019-06-28 NOTE — PROGRESS NOTES
dermatitis     Varicose veins of both lower extremities     Venous insufficiency of both lower extremities       Past Surgical History:     Past Surgical History:   Procedure Laterality Date    ANESTHESIA NERVE BLOCK Bilateral 3/25/2019    BILATERAL L3,L4,L5 DR MEDIAL BRANCH BLOCKS WITH FLUOROSCOPY performed by Leatha Reddy MD at [address]      [facility] [address] [facility] B[facility] 3/4/2019    BILATERAL L3, L4, L5 DORSAL RAMUS LUMBAR MEDIAL BRANCH BLOCK WITH FLUOROSCOPY performed by Leatha Reddy MD at [address] [facility] Bilateral 6/3/2019    BILATERAL L3,L4,L5 DORSAL RAMUS RADIOFRQUENCY ABLATION WITH FLUOROSCOPY performed by Leatha Reddy MD at [address] [facility]  8/2010    SKIN CANCER EXCISION Right 2017    [facility]. Current Medications:     Current Outpatient Medications:     metoprolol succinate (TOPROL XL) 100 MG extended release tablet, TAKE 1 TABLET BY MOUTH DAILY, Disp: 90 tablet, Rfl: 3    clomiPRAMINE (ANAFRANIL) 75 MG capsule, TAKE 2 CAPSULES BY MOUTH NIGHTLY, Disp: 180 capsule, Rfl: 3    aspirin 81 MG tablet, Take 81 mg by mouth daily, Disp: , Rfl:     Cholecalciferol (VITAMIN D3) 2000 UNITS CAPS, Take 1 capsule by mouth daily, Disp: , Rfl:   Allergies:  Patient has no known allergies. Social History:    reports that he has never smoked. He has never used smokeless tobacco. He reports that he does not drink alcohol or use drugs.   Family History:   Family History   Problem Relation Age of Onset    High Blood Pressure Mother     Heart Disease Mother         a fib    Heart Disease Father     Diabetes Father        REVIEW OF SYSTEMS:   CONSTITUTIONAL: Denies unexplained weight loss, fevers, chills or fatigue  NEUROLOGICAL: Denies unsteady gait or progressive weakness  MUSCULOSKELETAL: Denies joint swelling or redness  GI: Denies nausea, vomiting, diarrhea   : Denies bowel or bladder issues       PHYSICAL EXAM: Vitals: Height 5' 9.02\" (1.753 m), weight 207 lb 0.2 oz (93.9 kg). GENERAL EXAM:  · General Apparence: Patient is adequately groomed with no evidence of malnutrition. · Psychiatric: Orientation: The patient is oriented to time, place and person. The patient's mood and affect are appropriate   · Vascular: Examination reveals no swelling and palpation reveals no tenderness in upper or lower extremities. Good capillary refill. · The lymphatic examination of the neck, axillae and groin reveals all areas to be without enlargement or induration  · Sensation is intact without deficit in the upper and lower extremities to light touch and pinprick  · Coordination of the upper and lower extremities are normal.  · Additional Examinations:  · RIGHT UPPER EXTREMITY:  Inspection/examination of the right upper extremity does not show any tenderness, deformity or injury. Range of motion is unremarkable and pain-free. There is no gross instability. There are no rashes, ulcerations or lesions. Strength and tone are normal. No atrophy or abnormal movements are noted. · LEFT UPPER EXTREMITY: Inspection/examination of the left upper extremity does not show any tenderness, deformity or injury. Range of motion is unremarkable and pain-free. There is no gross instability. There are no rashes, ulcerations or lesions. Strength and tone are normal. No atrophy or abnormal movements are noted. LUMBAR/SACRAL EXAMINATION:  · Inspection: Local inspection shows no step-off or bruising. Lumbar alignment is normal. No instability is noted. · Palpation:   No evidence of tenderness at the midline. Lumbar paraspinal tenderness: Mild L4/5 and L5/S1 tenderness  Bursal tenderness No tenderness bilaterally  There is no paraspinal spasm. · Range of Motion: pain-free ROM  · Strength:   Strength testing is 5/5 in all muscle groups tested. · Special Tests:   Straight leg raise and crossed SLR negative. Maurice's testing is negative mg/dL   TSH without Reflex   Result Value Ref Range    TSH 3.55 0.27 - 4.20 uIU/mL   Psa screening   Result Value Ref Range    PSA 0.22 0.00 - 4.00 ng/mL     Impression:       1. Spondylosis without myelopathy or radiculopathy, lumbar region    2. DDD (degenerative disc disease), lumbar    3. Spondylolisthesis, lumbar region        Plan:  Clinical Course: Above diagnoses are improving     I discussed the diagnosis and the treatment options with Jenni Betancourt today. In Summary:  The various treatment options were outlined and discussed with Jenni Betancourt including:  Conservative care options: physical therapy, ice, medications, bracing, and activity modification. The indications for therapeutic injections. The indications for additional imaging/laboratory studies. The indications for (possible future) interventions. After considering the various options discussed, Jenni Betancourt elected to pursue a course of treatment that includes the followin. Medications:  No further recommendations for new medications. 2. PT:  I will start the patient on a trial of PT to work on a lumbar stabilization program to focus on core strengthening, core stabilizing, lumbar stretches, hamstring flexibility, modalities as indicated for 6-8 visits over the next 4-6 weeks. 3. Further studies:  No further studies. 4. Interventional:  75% relief after Lumbar RFA    5. Follow up:  4-6 weeks]      Jenni Betancourt was instructed to call the office if his symptoms worsen or if new symptoms appear prior to the next scheduled visit. He is specifically instructed to contact the office between now & his scheduled appointment if he has concerns related to his condition or if he needs assistance in scheduling the above tests. He is welcome to call for an appointment sooner if he has any additional concerns or questions. ALDEN Morgan am scribing for and in the presence of Dr. Roseann Smith.   The physical examination was performed between the patient and Dr. Marcus Anaya. All counseling during the appointment was performed between the patient and Dr. Marcus Anaya    06/28/19 8:24 AM  Dr. Agus Sy. Nelson, personally performed the services described in this documentation as scribed by ALDEN Rubio in my presence and it is both accurate and complete. Robby Mcqueen. Mercy Lee MD, JORDY, [facility]  Board Certified in [address] [place] [address] Certified and Fellowship Trained in [place] [address]      This dictation was performed with a verbal recognition program [facility]) and it was checked for errors. It is possible that there are still dictated errors within this office note. If so, please bring any errors to my attention for an addendum. All efforts were made to ensure that this office note is accurate.

## 2019-06-28 NOTE — LETTER
[facility]  [address] [address]  Phone: [phone]  Fax: [phone]    Randa Serrato MD        June 28, 2019     Patient: Anastasiia Rojas   YOB: 1950   Date of Visit: 6/28/2019       To Whom It May Concern: It is my medical opinion that Anastasiia Rojas has been under my care since 2/25/2019, and will continue to be under my care, for the following conditions: lumbar spondylosis without radiculopathy, lumbar degenerative disc disease and lumbar spondylolisthesis. If you have any questions or concerns, please don't hesitate to call. Sincerely,     Nannette Mejia.  Virgle Boas, MD.

## 2019-07-09 ENCOUNTER — HOSPITAL ENCOUNTER (OUTPATIENT)
Dept: PHYSICAL THERAPY | Age: 69
Setting detail: THERAPIES SERIES
Discharge: HOME | End: 2019-07-09
Payer: MEDICARE

## 2019-07-09 PROCEDURE — 97161 PT EVAL LOW COMPLEX 20 MIN: CPT

## 2019-07-09 PROCEDURE — 97140 MANUAL THERAPY 1/> REGIONS: CPT

## 2019-07-09 NOTE — FLOWSHEET NOTE
corresponding musculature. (10606)  Clean Technique was utilized today while applying Dry needling treatment. The treatment sites where cleaned with 70% solution of  isopropyl alcohol . The PT washed their hands and utilized treatment gloves along with hand  prior to inserting the needles. All needles where removed and discarded in the appropriate sharps container. MD has given verbal and/or written approval for this treatment. Modalities:   Hot pack x 10 min    Charges:  Timed Code Treatment Minutes: eval +20   Total Treatment Minutes: 60     [x] EVAL  [] XD(53948) x      [] IONTO  [] NMR (76931) x      [] VASO  [x] Manual (81397) x  1    [] Other:  [] TA x       [] Mech Traction (62845)  [] ES(attended) (27822)      [] ES (un) (01765):     Goals:   Patient stated goal: decrease pain with sit to stand     Therapist goals for Patient:   Short Term Goals: To be achieved in: 2 weeks  1. Independent in HEP and progression per patient tolerance, in order to prevent re-injury. 2. Patient will have a decrease in pain to facilitate improvement in movement, function, and ADLs as indicated by Functional Deficits.     Long Term Goals: To be achieved in: 4 weeks  1. Disability index score of 10% or less for the MEI to assist with reaching prior level of function. 2. Patient will demonstrate increased AROM to WNL, good LS mobility, good hip ROM to allow for proper joint functioning as indicated by patients Functional Deficits. 3. Patient will demonstrate an increase in Strength to good proximal hip and core activation to allow for proper functional mobility as indicated by patients Functional Deficits. 4. Patient will return to forward bending functional activities without increased symptoms or restriction. 5. Pt will tolerate standing after prolonged sitting with pain less than 3/10    Progression Towards Functional goals:  [] Patient is progressing as expected towards functional goals listed. [] Progression is slowed due to complexities listed. [] Progression has been slowed due to co-morbidities. [x] Plan just implemented, too soon to assess goals progression  [] Other:     ASSESSMENT:  See eval    Treatment/Activity Tolerance:  [x] Patient tolerated treatment well [] Patient limited by fatique  [] Patient limited by pain  [] Patient limited by other medical complications  [] Other:     Prognosis: [x] Good [] Fair  [] Poor    Patient Requires Follow-up: [x] Yes  [] No    PLAN: See eval  [] Continue per plan of care [] Alter current plan (see comments)  [x] Plan of care initiated [] Hold pending MD visit [] Discharge    Electronically signed by:  Kelly Nassar PT

## 2019-07-09 NOTE — PLAN OF CARE
C   Rotation      LEFT RIGHT   HIP Flex [facility] WFL   HIP Abd     [facility]   HIP IR [facility] WFL   Knee Flex     Knee ext     Hamstring FLEX tight tight   Piriformis  tight tight   Prone quad tight tight        Strength  LEFT RIGHT   MfA     TrA     HIP Flexors 5 5   HIP Abductors 5 5   HIP ER     Hip IR     Knee EXT (quad) 5 5   Knee Flex (HS) 5 5   Ankle DF 5 5   Ankle PF 5 5   Great Toe Ext 5 5     Reflexes/Sensation:    [x]Dermatomes/Myotomes intact    []UE Reflexes     []Normal []Hypo      []Hyper   [x]LE Reflexes     [x]Normal []Hypo      []Hyper   [x]Babinski/Clonus/Hoffmans:  neg   []Other:    Joint mobility: hypo T12-L5, tender L3-4 CPA and B UPA glides   []Normal    [x]Hypo   []Hyper    Palpation: no muscular tenderness    Functional Mobility/Transfers: WFL    Posture: increased hip flexion in standing    Bandages/Dressings/Incisions: n/a    Gait: (include devices/WB status)     Orthopedic Special Tests: (-) slump (-) SLR                       [x] Patient history, allergies, meds reviewed. Medical chart reviewed. See intake form. Review Of Systems (ROS):  [x]Performed Review of systems (Integumentary, CardioPulmonary, Neurological) by intake and observation. Intake form has been scanned into medical record. Patient has been instructed to contact their primary care physician regarding ROS issues if not already being addressed at this time.       Co-morbidities/Complexities (which will affect course of rehabilitation):   []None           Arthritic conditions   []Rheumatoid arthritis (M05.9)  []Osteoarthritis (M19.91)   Cardiovascular conditions   []Hypertension (I10)  []Hyperlipidemia (E78.5)  []Angina pectoris (I20)  []Atherosclerosis (I70)   Musculoskeletal conditions   []Disc pathology   []Congenital spine pathologies   []Prior surgical intervention  []Osteoporosis (M81.8)  []Osteopenia (M85.8)   Endocrine conditions   []Hypothyroid (E03.9)  []Hyperthyroid Gastrointestinal conditions   []Constipation ability to forward bend   []Reduced ability to ambulate prolonged functional periods/distances/surfaces   []Reduced ability to ascend/descend stairs   []other:       Participation Restrictions   [x]Reduced participation in self care activities   [x]Reduced participation in home management activities   []Reduced participation in work activities   [x]Reduced participation in social activities. []Reduced participation in sport/recreation activities. Classification:   []Signs/symptoms consistent with Lumbar instability/stabilization subgroup. [x]Signs/symptoms consistent with Lumbar mobilization/manipulation subgroup, myotomes and dermatomes intact. Meets manipulation criteria. []Signs/symptoms consistent with Lumbar direction specific/centralization subgroup   []Signs/symptoms consistent with Lumbar traction subgroup     []Signs/symptoms consistent with lumbar facet dysfunction   []Signs/symptoms consistent with lumbar stenosis type dysfunction   []Signs/symptoms consistent with nerve root involvement including myotome & dermatome dysfunction   []Signs/symptoms consistent with post-surgical status including: decreased ROM, strength and function.    []signs/symptoms consistent with pathology which may benefit from Dry needling     []other:      Prognosis/Rehab Potential:      [x]Excellent   []Good    []Fair   []Poor    Tolerance of evaluation/treatment:    [x]Excellent   []Good    []Fair   []Poor     Physical Therapy Evaluation Complexity Justification  [x] A history of present problem with:  [x] no personal factors and/or comorbidities that impact the plan of care;  []1-2 personal factors and/or comorbidities that impact the plan of care  []3 personal factors and/or comorbidities that impact the plan of care  [x] An examination of body systems using standardized tests and measures addressing any of the following: body structures and functions (impairments), activity limitations, and/or participation

## 2019-07-15 ENCOUNTER — HOSPITAL ENCOUNTER (OUTPATIENT)
Dept: PHYSICAL THERAPY | Age: 69
Setting detail: THERAPIES SERIES
Discharge: HOME | End: 2019-07-15
Payer: MEDICARE

## 2019-07-15 PROCEDURE — 97140 MANUAL THERAPY 1/> REGIONS: CPT

## 2019-07-15 PROCEDURE — 97110 THERAPEUTIC EXERCISES: CPT

## 2019-07-15 NOTE — FLOWSHEET NOTE
[facility]    Physical Therapy Daily Treatment Note  Date:  7/15/2019    Patient Name:  Tamica Justice    :  1950  MRN: [id number]  Restrictions/Precautions:    Medical/Treatment Diagnosis Information:  · Diagnosis: Lumbar spondylosis  · Treatment Diagnosis: low back pain E57.9  Insurance/Certification information:  PT Insurance Information: [employer]  Physician Information:  Referring Practitioner: Junior Salvatore MD  Plan of care signed (Y/N):     Date of Patient follow up with Physician:     G-Code (if applicable):      Date G-Code Applied:         Progress Note: []  Yes  []  No  Next due by: Visit #10      Latex Allergy:  [x]NO      []YES  Preferred Language for Healthcare:   [x]English       []other:    Visit # Insurance Allowable   2 Medicare cap     Pain level:  4/10     SUBJECTIVE:  Pt states his pain is much better since the first visit. Still having the most pain when standing after prolonged sitting. OBJECTIVE:   Observation:   Test measurements:    7/15 lumbar ext 30deg, sidebending 30 deg B.  painfree all motion.      RESTRICTIONS/PRECAUTIONS: mobility deficits    Exercises/Interventions:     Therapeutic Ex Wt / Resistance sets/sec reps notes   Hand/heel rocking 2\" 1 10    LTR  1 10    Bridging  2 10    Supine hip flexor stretch  30\" 2    Seated 1/2 wood chops blue 1 20    pallof press blue 1 20                                                                          Manual Intervention  20'       Dry needling  5 min     Prone PA  10 min  UPA gr III   Attended stim  5 min     Lumbar Manip       SI Manip       Hip belt mobs       Hip LA distraction              NMR re-education        Mf Activation- re-ed       TrA Re-ed activation       Glute Max re-ed activation                       Therapeutic Exercise and NMR EXR  [x] (14079) Provided verbal/tactile cueing for activities related to strengthening, flexibility, endurance, ROM to good proximal hip and core activation to allow for proper functional mobility as indicated by patients Functional Deficits. 4. Patient will return to forward bending functional activities without increased symptoms or restriction. 5. Pt will tolerate standing after prolonged sitting with pain less than 3/10    Progression Towards Functional goals:  [] Patient is progressing as expected towards functional goals listed. [] Progression is slowed due to complexities listed. [] Progression has been slowed due to co-morbidities. [x] Plan just implemented, too soon to assess goals progression  [] Other:     ASSESSMENT:  painfree lumbar ROM this date with increased excursion. Good tolerance for exercise progressions but sore in the hip flexors from dynamic stretching with bridges. Treatment/Activity Tolerance:  [x] Patient tolerated treatment well [] Patient limited by fatique  [] Patient limited by pain  [] Patient limited by other medical complications  [] Other:     Prognosis: [x] Good [] Fair  [] Poor    Patient Requires Follow-up: [x] Yes  [] No    PLAN:   [x] Continue per plan of care [] Alter current plan (see comments)  [] Plan of care initiated [] Hold pending MD visit [] Discharge    Electronically signed by:  Jovanni Fontenot PT

## 2019-07-18 ENCOUNTER — HOSPITAL ENCOUNTER (OUTPATIENT)
Dept: PHYSICAL THERAPY | Age: 69
Setting detail: THERAPIES SERIES
Discharge: HOME | End: 2019-07-18
Payer: MEDICARE

## 2019-07-18 PROCEDURE — 97140 MANUAL THERAPY 1/> REGIONS: CPT

## 2019-07-18 PROCEDURE — 97110 THERAPEUTIC EXERCISES: CPT

## 2019-07-18 NOTE — FLOWSHEET NOTE
improvements in proximal hip and core control with self care, mobility, lifting and ambulation. [x] (83165) Provided verbal/tactile cueing for activities related to improving balance, coordination, kinesthetic sense, posture, motor skill, proprioception  to assist with core control in self care, mobility, lifting, and ambulation. Therapeutic Activities:    [x] (36144 or 86527) Provided verbal/tactile cueing for activities related to improving balance, coordination, kinesthetic sense, posture, motor skill, proprioception and motor activation to allow for proper function  with self care and ADLs  [x] (11401) Provided training and instruction to the patient for proper core and proximal hip recruitment and positioning with ambulation re-education     Home Exercise Program:    [x] (67311) Reviewed/Progressed HEP activities related to strengthening, flexibility, endurance, ROM of core, proximal hip and LE for functional self-care, mobility, lifting and ambulation   [] (15991) Reviewed/Progressed HEP activities related to improving balance, coordination, kinesthetic sense, posture, motor skill, proprioception of core, proximal hip and LE for self care, mobility, lifting, and ambulation      Manual Treatments:  PROM / STM / Oscillations-Mobs:  G-I, II, III, IV (PA's, Inf., Post.)  [x] (85814) Provided manual therapy to mobilize proximal hip and LS spine soft tissue/joints for the purpose of modulating pain, promoting relaxation,  increasing ROM, reducing/eliminating soft tissue swelling/inflammation/restriction, improving soft tissue extensibility and allowing for proper ROM for normal function with self care, mobility, lifting and ambulation. Spoke with Yousif Lutz  regarding the use of Dry Needling     Dry needling manual therapy: consisted on the placement of 6 needles in the following muscles:  L3-5 paraspianls. A 50 mm needle was inserted, piston, rotated, and coned to produce intramuscular mobilization.   These techniques were used to restore functional range of motion, reduce muscle spasm and induce healing in the corresponding musculature. (73815)  Clean Technique was utilized today while applying Dry needling treatment. The treatment sites where cleaned with 70% solution of  isopropyl alcohol . The PT washed their hands and utilized treatment gloves along with hand  prior to inserting the needles. All needles where removed and discarded in the appropriate sharps container. MD has given verbal and/or written approval for this treatment. Attended low frequency (1-20Hz) electrical stimulation was utilized in conjunction with Dry Needling:  the Estim was manipulated between all above needles for a period of 5 min. at 2.5 volts. The low frequency electrical stimulation was used to help reduce muscle spasm and help to interrupt /Russell the pain cycle. (99177)     Modalities:   Hot pack x 10 min    Charges:  Timed Code Treatment Minutes: 45   Total Treatment Minutes: 55     [] EVAL  [x] MO(88426) x  3   [] IONTO  [] NMR (20833) x      [] VASO  [x] Manual (37431) x  1    [] Other:  [] TA x       [] Mech Traction (18776)  [] ES(attended) (00833)      [] ES (un) (90566):     Goals:   Patient stated goal: decrease pain with sit to stand     Therapist goals for Patient:   Short Term Goals: To be achieved in: 2 weeks  1. Independent in HEP and progression per patient tolerance, in order to prevent re-injury. 2. Patient will have a decrease in pain to facilitate improvement in movement, function, and ADLs as indicated by Functional Deficits.     Long Term Goals: To be achieved in: 4 weeks  1. Disability index score of 10% or less for the MEI to assist with reaching prior level of function. 2. Patient will demonstrate increased AROM to WNL, good LS mobility, good hip ROM to allow for proper joint functioning as indicated by patients Functional Deficits.    3. Patient will demonstrate an increase in Strength to good

## 2019-07-23 ENCOUNTER — TELEPHONE (OUTPATIENT)
Dept: ORTHOPEDIC SURGERY | Age: 69
End: 2019-07-23

## 2019-07-25 ENCOUNTER — HOSPITAL ENCOUNTER (OUTPATIENT)
Dept: PHYSICAL THERAPY | Age: 69
Setting detail: THERAPIES SERIES
Discharge: HOME | End: 2019-07-25
Payer: MEDICARE

## 2019-07-25 PROCEDURE — 97140 MANUAL THERAPY 1/> REGIONS: CPT

## 2019-07-25 PROCEDURE — 97110 THERAPEUTIC EXERCISES: CPT

## 2019-07-30 ENCOUNTER — HOSPITAL ENCOUNTER (OUTPATIENT)
Dept: PHYSICAL THERAPY | Age: 69
Setting detail: THERAPIES SERIES
Discharge: HOME | End: 2019-07-30
Payer: MEDICARE

## 2019-07-30 PROCEDURE — 97110 THERAPEUTIC EXERCISES: CPT

## 2019-07-30 PROCEDURE — 97140 MANUAL THERAPY 1/> REGIONS: CPT

## 2019-08-01 ENCOUNTER — OFFICE VISIT (OUTPATIENT)
Dept: ORTHOPEDIC SURGERY | Age: 69
End: 2019-08-01
Payer: MEDICARE

## 2019-08-01 VITALS
BODY MASS INDEX: 30.66 KG/M2 | DIASTOLIC BLOOD PRESSURE: 76 MMHG | HEIGHT: 69 IN | RESPIRATION RATE: 16 BRPM | HEART RATE: 69 BPM | SYSTOLIC BLOOD PRESSURE: 117 MMHG | WEIGHT: 207.01 LBS

## 2019-08-01 DIAGNOSIS — M51.26 HNP (HERNIATED NUCLEUS PULPOSUS), LUMBAR: ICD-10-CM

## 2019-08-01 DIAGNOSIS — M54.16 LUMBAR RADICULOPATHY: Primary | ICD-10-CM

## 2019-08-01 DIAGNOSIS — M47.816 SPONDYLOSIS WITHOUT MYELOPATHY OR RADICULOPATHY, LUMBAR REGION: ICD-10-CM

## 2019-08-01 PROCEDURE — 1123F ACP DISCUSS/DSCN MKR DOCD: CPT | Performed by: PHYSICAL MEDICINE & REHABILITATION

## 2019-08-01 PROCEDURE — 1036F TOBACCO NON-USER: CPT | Performed by: PHYSICAL MEDICINE & REHABILITATION

## 2019-08-01 PROCEDURE — 3017F COLORECTAL CA SCREEN DOC REV: CPT | Performed by: PHYSICAL MEDICINE & REHABILITATION

## 2019-08-01 PROCEDURE — G8427 DOCREV CUR MEDS BY ELIG CLIN: HCPCS | Performed by: PHYSICAL MEDICINE & REHABILITATION

## 2019-08-01 PROCEDURE — 4040F PNEUMOC VAC/ADMIN/RCVD: CPT | Performed by: PHYSICAL MEDICINE & REHABILITATION

## 2019-08-01 PROCEDURE — 99213 OFFICE O/P EST LOW 20 MIN: CPT | Performed by: PHYSICAL MEDICINE & REHABILITATION

## 2019-08-01 PROCEDURE — G8417 CALC BMI ABV UP PARAM F/U: HCPCS | Performed by: PHYSICAL MEDICINE & REHABILITATION

## 2019-08-01 NOTE — PROGRESS NOTES
Follow up: Diana Morrison  1950  [id number]         Chief Complaint   Patient presents with    Lower Back Pain    Leg Pain     Left          HISTORY OF PRESENT ILLNESS:  Mr. Louis Cormier is a 78 y.o. male returns for a follow up visit for multiple medical problems. His current presenting problems are   1. Lumbar radiculopathy    2. HNP (herniated nucleus pulposus), lumbar    3. Spondylosis without myelopathy or radiculopathy, lumbar region    . As per information/history obtained from the PADT(patient assessment and documentation tool) - He complains of pain in the lower back with radiation to the upper leg Left and lower leg Left He rates the pain 2/10 and describes it as sharp, piercing. Pain is made worse by: bending, driving. He denies side effects from the current pain regimen. Patient reports that since the last follow up visit the physical functioning is better, family/social relationships are better, mood is better and sleep patterns are better, and that the overall functioning is better. Patient denies neurological bowel or bladder. Patient in office for lower back pain that radiates into his lefy leg. He states that his pain level is a 2/10 in today's visit. He states that he went for a long walk when he noticed his leg pain. He states that he has gone to 2 sessions of PT and believes that it has helped his leg.      Associated signs and symptoms:   Neurogenic bowel or bladder symptoms:  no   Perceived weakness:  no   Difficulty walking:  no              Past Medical History:   Past Medical History:   Diagnosis Date    Alcoholism ([facility] [place] [address]     Anxiety     Basal cell cancer 8/2010    Dr. Susu Avilez Depression     DVT, lower extremity ([facility] [place] [address] 5/29/2015    Right, gastroc, soleal    Hyperlipidemia     Osteopenia     Squamous cell skin cancer 2017    right ear lobe    Stasis dermatitis     Varicose veins of both lower extremities     Venous insufficiency of both lower extremities       Past CO2 24 21 - 32 mmol/L    Anion Gap 15 3 - 16    Glucose 97 70 - 99 mg/dL    BUN 19 7 - 20 mg/dL    CREATININE 1.0 0.8 - 1.3 mg/dL    GFR Non-African American >60 >60    GFR African American >60 >60    Calcium 9.5 8.3 - 10.6 mg/dL    Total Protein 7.1 6.4 - 8.2 g/dL    Alb 4.4 3.4 - 5.0 g/dL    Albumin/Globulin Ratio 1.6 1.1 - 2.2    Total Bilirubin 0.3 0.0 - 1.0 mg/dL    Alkaline Phosphatase 68 40 - 129 U/L    ALT 18 10 - 40 U/L    AST 19 15 - 37 U/L    Globulin 2.7 g/dL   Lipid Panel   Result Value Ref Range    Cholesterol, Total 138 0 - 199 mg/dL    Triglycerides 165 (H) 0 - 150 mg/dL    HDL 40 40 - 60 mg/dL    LDL Calculated 65 <100 mg/dL    VLDL Cholesterol Calculated 33 Not Established mg/dL   TSH without Reflex   Result Value Ref Range    TSH 3.55 0.27 - 4.20 uIU/mL   Psa screening   Result Value Ref Range    PSA 0.22 0.00 - 4.00 ng/mL     Impression:       1. Lumbar radiculopathy    2. HNP (herniated nucleus pulposus), lumbar    3. Spondylosis without myelopathy or radiculopathy, lumbar region        Plan:  Clinical Course: Above diagnoses are improving     I discussed the diagnosis and the treatment options with Marah Thomas today. In Summary:  The various treatment options were outlined and discussed with Marah Thomas including:  Conservative care options: physical therapy, ice, medications, bracing, and activity modification. The indications for therapeutic injections. The indications for additional imaging/laboratory studies. The indications for (possible future) interventions. After considering the various options discussed, Marah Thomas elected to pursue a course of treatment that includes the followin. Medications:  No further recommendations for new medications. 2. PT:  Encouraged to continue with HEP. 3. Further studies:  No further studies. 4. Interventional:  Still doing well after lumbar RFA. Short term increase in pain due to lumbar HNP.   Most of this has improved    5. Follow up:  4-6 weeks      Graciela Latham was instructed to call the office if his symptoms worsen or if new symptoms appear prior to the next scheduled visit. He is specifically instructed to contact the office between now & his scheduled appointment if he has concerns related to his condition or if he needs assistance in scheduling the above tests. He is welcome to call for an appointment sooner if he has any additional concerns or questions. Gilda Castro CRMA, am scribing for and in the presence of Dr. Seth Damon. 08/01/19 5:32 PM Kina Clifton CRMA. I, Dr. Seth Damon, personally performed the services described in this documentation as scribed by Kina Clitfon CRMA in my presence and it is both accurate and complete. Keira Meier. Zoraida Mclaughlin MD, JORDY, [facility]  Board Certified in [address] [place] [address] Certified and Fellowship Trained in [facility] ([facility])             This dictation was performed with a verbal recognition program [facility] ) and it was checked for errors. It is possible that there are still dictated errors within this office note. If so, please bring any errors to my attention for an addendum. All efforts were made to ensure that this office note is accurate.

## 2019-08-06 ENCOUNTER — HOSPITAL ENCOUNTER (OUTPATIENT)
Dept: PHYSICAL THERAPY | Age: 69
Setting detail: THERAPIES SERIES
Discharge: HOME | End: 2019-08-06
Payer: MEDICARE

## 2019-08-06 PROCEDURE — 97110 THERAPEUTIC EXERCISES: CPT

## 2019-08-08 ENCOUNTER — OFFICE VISIT (OUTPATIENT)
Dept: INTERNAL MEDICINE CLINIC | Age: 69
End: 2019-08-08
Payer: MEDICARE

## 2019-08-08 VITALS
HEIGHT: 69 IN | BODY MASS INDEX: 27.4 KG/M2 | HEART RATE: 68 BPM | WEIGHT: 185 LBS | RESPIRATION RATE: 12 BRPM | SYSTOLIC BLOOD PRESSURE: 120 MMHG | DIASTOLIC BLOOD PRESSURE: 72 MMHG

## 2019-08-08 DIAGNOSIS — N52.9 ERECTILE DYSFUNCTION, UNSPECIFIED ERECTILE DYSFUNCTION TYPE: Primary | ICD-10-CM

## 2019-08-08 DIAGNOSIS — M48.061 SPINAL STENOSIS OF LUMBAR REGION WITHOUT NEUROGENIC CLAUDICATION: ICD-10-CM

## 2019-08-08 DIAGNOSIS — F32.5 MAJOR DEPRESSIVE DISORDER WITH SINGLE EPISODE, IN FULL REMISSION (HCC): ICD-10-CM

## 2019-08-08 DIAGNOSIS — I10 BENIGN ESSENTIAL HTN: ICD-10-CM

## 2019-08-08 DIAGNOSIS — E34.9 HYPOTESTOSTERONISM: ICD-10-CM

## 2019-08-08 DIAGNOSIS — E78.5 DYSLIPIDEMIA: ICD-10-CM

## 2019-08-08 DIAGNOSIS — I87.2 VENOUS INSUFFICIENCY OF BOTH LOWER EXTREMITIES: ICD-10-CM

## 2019-08-08 PROCEDURE — G8417 CALC BMI ABV UP PARAM F/U: HCPCS | Performed by: INTERNAL MEDICINE

## 2019-08-08 PROCEDURE — 99214 OFFICE O/P EST MOD 30 MIN: CPT | Performed by: INTERNAL MEDICINE

## 2019-08-08 PROCEDURE — 1123F ACP DISCUSS/DSCN MKR DOCD: CPT | Performed by: INTERNAL MEDICINE

## 2019-08-08 PROCEDURE — 1036F TOBACCO NON-USER: CPT | Performed by: INTERNAL MEDICINE

## 2019-08-08 PROCEDURE — 3017F COLORECTAL CA SCREEN DOC REV: CPT | Performed by: INTERNAL MEDICINE

## 2019-08-08 PROCEDURE — 4040F PNEUMOC VAC/ADMIN/RCVD: CPT | Performed by: INTERNAL MEDICINE

## 2019-08-08 PROCEDURE — G8427 DOCREV CUR MEDS BY ELIG CLIN: HCPCS | Performed by: INTERNAL MEDICINE

## 2019-08-08 RX ORDER — FENOFIBRATE 160 MG/1
160 TABLET ORAL DAILY
COMMUNITY
End: 2020-04-06

## 2019-08-08 NOTE — PROGRESS NOTES
[facility]) Physicians  Internal Medicine  Patient Encounter  Pura Manley D.O., [facility]        Chief Complaint   Patient presents with   Palmer Kendall    Medication Check       HPI: 78 y.o. male seen today for follow up regarding status of his current chronic medical problems listed below along with a med review. He retired in June. Patient has been seeing Dr. Dina Vaughan for lumbar degenerative disc disease, spondylolisthesis and spondylosis. He has been going through physical therapy. He will continue a HEP. Pt went through RF nerve root ablation on 6/3/2019. He states he has \"zero\" pain at this time. He cancelled an [place] trip. Pt has since seen ENT for a uvula lesion. This was a benign papilloma. Also, he has additional complaints of ED. The meds do not work. Sex drive is good. He has had a low Testosterone. Hyperlipidemia:    Lab Results   Component Value Date    LDLCALC 65 02/08/2019   He remains on Fenofibrate 160 mg daily. He is due for lipid profile. His last triglyceride level was 165. LDL was 65. He states his TG levels were 200 when he started. HTN--Patient denies any symptoms suggestive of accelerated blood pressures. He remains on Toprol- mg daily. He tolerates the medication well. He denies any chest pain, headaches, shortness of breath, lightheadedness, syncopal episodes. He endorses no symptoms related to TIA or stroke. He has lost weight with Nutrisystem. He has lost about 15#. Venous reflux-- Previous history----> H/O Left calf DVT x 1. He was on Eliquis. He has also had Superficial thrombophlebitis. He was on Ibuprofen for that. He did have a repeat duplex which showed reflux. No surgery recommended. Compression stockings recommended. Duplex--   Impressions   Right Impression   No evidence of deep or superficial venous thrombosis of the right lower   extremity.    Evidence of severe venous insufficiency of the right CFV,

## 2019-08-08 NOTE — PATIENT INSTRUCTIONS
risk for having a heart attack or stroke. You and your doctor can talk about whether you need to lower your risk and what treatment is best for you. A heart-healthy lifestyle along with medicines can help lower your cholesterol and your risk. The way you choose to lower your risk will depend on how high your risk is for heart attack and stroke. It will also depend on how you feel about taking medicines. Follow-up care is a key part of your treatment and safety. Be sure to make and go to all appointments, and call your doctor if you are having problems. It's also a good idea to know your test results and keep a list of the medicines you take. How can you care for yourself at home? · Eat a variety of foods every day. Good choices include fruits, vegetables, whole grains (like oatmeal), dried beans and peas, nuts and seeds, soy products (like tofu), and fat-free or low-fat dairy products. · Replace butter, margarine, and hydrogenated or partially hydrogenated oils with olive and canola oils. (Canola oil margarine without trans fat is fine.)  · Replace red meat with fish, poultry, and soy protein (like tofu). · Limit processed and packaged foods like chips, crackers, and cookies. · Bake, broil, or steam foods. Don't rodriguez them. · Be physically active. Get at least 30 minutes of exercise on most days of the week. Walking is a good choice. You also may want to do other activities, such as running, swimming, cycling, or playing tennis or team sports. · Stay at a healthy weight or lose weight by making the changes in eating and physical activity listed above. Losing just a small amount of weight, even 5 to 10 pounds, can reduce your risk for having a heart attack or stroke. · Do not smoke. When should you call for help? Watch closely for changes in your health, and be sure to contact your doctor if:    · You need help making lifestyle changes.     · You have questions about your medicine.    Where can you learn Walking is a good choice. You also may want to do other activities, such as running, swimming, cycling, or playing tennis or team sports. · Stretch your back muscles. Here are few exercises to try:  ? Lie on your back with your knees bent and your feet flat on the floor. Gently pull one bent knee to your chest. Put that foot back on the floor, and then pull the other knee to your chest. Hold for 15 to 30 seconds. Repeat 2 to 4 times. ? Do pelvic tilts. Lie on your back with your knees bent. Tighten your stomach muscles. Pull your belly button (navel) in and up toward your ribs. You should feel like your back is pressing to the floor and your hips and pelvis are slightly lifting off the floor. Hold for 6 seconds while breathing smoothly. · Keep your core muscles strong. The muscles of your back, belly (abdomen), and buttocks support your spine. ? Pull in your belly, and imagine pulling your navel toward your spine. Hold this for 6 seconds, then relax. Remember to keep breathing normally as you tense your muscles. ? Do curl-ups. Always do them with your knees bent. Keep your low back on the floor, and curl your shoulders toward your knees using a smooth, slow motion. Keep your arms folded across your chest. If this bothers your neck, try putting your hands behind your neck (not your head), with your elbows spread apart. ? Lie on your back with your knees bent and your feet flat on the floor. Tighten your belly muscles, and then push with your feet and raise your buttocks up a few inches. Hold this position 6 seconds as you continue to breathe normally, then lower yourself slowly to the floor. Repeat 8 to 12 times. ? If you like group exercise, try Pilates or yoga. These classes have poses that strengthen the core muscles. Protect your back when you sit  · Place a small pillow, a rolled-up towel, or a lumbar roll in the curve of your back if you need extra support.   · Sit in a chair that is low enough to let

## 2019-08-11 ENCOUNTER — PATIENT MESSAGE (OUTPATIENT)
Dept: INTERNAL MEDICINE CLINIC | Age: 69
End: 2019-08-11

## 2019-08-11 DIAGNOSIS — E34.9 HYPOTESTOSTERONISM: Primary | ICD-10-CM

## 2019-08-11 DIAGNOSIS — E29.1 MALE HYPOGONADISM: ICD-10-CM

## 2019-08-13 RX ORDER — TESTOSTERONE 16.2 MG/G
2 GEL TRANSDERMAL DAILY
Qty: 3 BOTTLE | Refills: 0 | Status: SHIPPED | OUTPATIENT
Start: 2019-08-13 | End: 2020-02-10 | Stop reason: SDUPTHER

## 2019-11-27 ENCOUNTER — OFFICE VISIT (OUTPATIENT)
Dept: INTERNAL MEDICINE CLINIC | Age: 69
End: 2019-11-27
Payer: MEDICARE

## 2019-11-27 ENCOUNTER — HOSPITAL ENCOUNTER (OUTPATIENT)
Dept: GENERAL RADIOLOGY | Age: 69
Discharge: HOME | End: 2019-11-27
Payer: MEDICARE

## 2019-11-27 ENCOUNTER — HOSPITAL ENCOUNTER (OUTPATIENT)
Age: 69
Discharge: HOME | End: 2019-11-27
Payer: MEDICARE

## 2019-11-27 VITALS — HEART RATE: 77 BPM | SYSTOLIC BLOOD PRESSURE: 120 MMHG | DIASTOLIC BLOOD PRESSURE: 68 MMHG

## 2019-11-27 DIAGNOSIS — S76.302A LEFT HAMSTRING INJURY, INITIAL ENCOUNTER: ICD-10-CM

## 2019-11-27 DIAGNOSIS — M70.72 ISCHIAL BURSITIS OF LEFT SIDE: ICD-10-CM

## 2019-11-27 DIAGNOSIS — S30.0XXA CONTUSION OF BUTTOCK, INITIAL ENCOUNTER: ICD-10-CM

## 2019-11-27 DIAGNOSIS — S30.0XXA CONTUSION OF BUTTOCK, INITIAL ENCOUNTER: Primary | ICD-10-CM

## 2019-11-27 PROCEDURE — G8427 DOCREV CUR MEDS BY ELIG CLIN: HCPCS | Performed by: INTERNAL MEDICINE

## 2019-11-27 PROCEDURE — 72170 X-RAY EXAM OF PELVIS: CPT

## 2019-11-27 PROCEDURE — G8417 CALC BMI ABV UP PARAM F/U: HCPCS | Performed by: INTERNAL MEDICINE

## 2019-11-27 PROCEDURE — 99213 OFFICE O/P EST LOW 20 MIN: CPT | Performed by: INTERNAL MEDICINE

## 2019-11-27 PROCEDURE — 1036F TOBACCO NON-USER: CPT | Performed by: INTERNAL MEDICINE

## 2019-11-27 PROCEDURE — 3017F COLORECTAL CA SCREEN DOC REV: CPT | Performed by: INTERNAL MEDICINE

## 2019-11-27 PROCEDURE — G8484 FLU IMMUNIZE NO ADMIN: HCPCS | Performed by: INTERNAL MEDICINE

## 2019-11-27 PROCEDURE — 1123F ACP DISCUSS/DSCN MKR DOCD: CPT | Performed by: INTERNAL MEDICINE

## 2019-11-27 PROCEDURE — 4040F PNEUMOC VAC/ADMIN/RCVD: CPT | Performed by: INTERNAL MEDICINE

## 2020-02-10 ENCOUNTER — OFFICE VISIT (OUTPATIENT)
Dept: INTERNAL MEDICINE CLINIC | Age: 70
End: 2020-02-10
Payer: MEDICARE

## 2020-02-10 VITALS
WEIGHT: 179 LBS | SYSTOLIC BLOOD PRESSURE: 80 MMHG | HEART RATE: 80 BPM | HEIGHT: 69 IN | RESPIRATION RATE: 12 BRPM | BODY MASS INDEX: 26.51 KG/M2 | DIASTOLIC BLOOD PRESSURE: 50 MMHG

## 2020-02-10 PROCEDURE — G0446 INTENS BEHAVE THER CARDIO DX: HCPCS | Performed by: INTERNAL MEDICINE

## 2020-02-10 PROCEDURE — 4040F PNEUMOC VAC/ADMIN/RCVD: CPT | Performed by: INTERNAL MEDICINE

## 2020-02-10 PROCEDURE — 1123F ACP DISCUSS/DSCN MKR DOCD: CPT | Performed by: INTERNAL MEDICINE

## 2020-02-10 PROCEDURE — 3017F COLORECTAL CA SCREEN DOC REV: CPT | Performed by: INTERNAL MEDICINE

## 2020-02-10 PROCEDURE — G8484 FLU IMMUNIZE NO ADMIN: HCPCS | Performed by: INTERNAL MEDICINE

## 2020-02-10 PROCEDURE — G0438 PPPS, INITIAL VISIT: HCPCS | Performed by: INTERNAL MEDICINE

## 2020-02-10 RX ORDER — METOPROLOL SUCCINATE 100 MG/1
TABLET, EXTENDED RELEASE ORAL
Qty: 45 TABLET | Refills: 3 | Status: SHIPPED | OUTPATIENT
Start: 2020-02-10 | End: 2020-04-04 | Stop reason: SDUPTHER

## 2020-02-10 RX ORDER — TESTOSTERONE 16.2 MG/G
2 GEL TRANSDERMAL DAILY
Qty: 3 BOTTLE | Refills: 0 | Status: SHIPPED | OUTPATIENT
Start: 2020-02-10 | End: 2020-04-06

## 2020-02-10 RX ORDER — CLOMIPRAMINE HYDROCHLORIDE 50 MG/1
100 CAPSULE ORAL NIGHTLY
Qty: 30 CAPSULE | Refills: 0 | Status: SHIPPED | OUTPATIENT
Start: 2020-02-10 | End: 2020-02-24 | Stop reason: DRUGHIGH

## 2020-02-10 ASSESSMENT — PATIENT HEALTH QUESTIONNAIRE - PHQ9
SUM OF ALL RESPONSES TO PHQ QUESTIONS 1-9: 0
SUM OF ALL RESPONSES TO PHQ QUESTIONS 1-9: 0

## 2020-02-10 ASSESSMENT — LIFESTYLE VARIABLES: HOW OFTEN DO YOU HAVE A DRINK CONTAINING ALCOHOL: 0

## 2020-02-10 NOTE — PROGRESS NOTES
[facility]) Physicians  Internal Medicine  Patient Encounter  Manual ISABELLA Bhandari, [facility]        Medicare Annual Wellness Visit      Name: Jeff Trivedi Date: 2/10/2020   MRN: [id number] Sex: Male   Age: 78 y.o. Ethnicity: Non-/Non    : 1950 Race: Alejo Razo is here for Medicare AWV    Screenings for behavioral, psychosocial and functional/safety risks, and cognitive dysfunction are all negative except as indicated below. These results, as well as other patient data from the [address] form, are documented in Flowsheets linked to this Encounter.         Medical/Surgical Histories     Past Medical History:   Diagnosis Date    Acquired spondylolisthesis of lumbosacral region 2019    Alcoholism ([address]     Anxiety     Basal cell cancer 2010    Dr. Coby Negron Chronic bilateral low back pain without sciatica 2019    DDD (degenerative disc disease), lumbar 2019    Depression     DVT, lower extremity ([address] 2015    Right, gastroc, soleal    Hyperlipidemia     Lumbar facet arthropathy 2/15/2019    Lumbar foraminal stenosis 2/15/2019    Osteopenia     Squamous cell skin cancer     right ear lobe    Stasis dermatitis     Varicose veins of both lower extremities     Venous insufficiency of both lower extremities       No prior H/O DVT, PE or bleeding dyscrasias    Past Surgical History:   Procedure Laterality Date    ANESTHESIA NERVE BLOCK Bilateral 3/25/2019    BILATERAL L3,L4,L5 DR MEDIAL BRANCH BLOCKS WITH FLUOROSCOPY performed by Sharon Cervantes MD at [address]      from [address] [place] [address] Bilateral 3/4/2019    BILATERAL L3, L4, L5 DORSAL RAMUS LUMBAR MEDIAL BRANCH BLOCK WITH FLUOROSCOPY performed by Sharon Cervantes MD at [address] [place] [address] Bilateral 6/3/2019    BILATERAL L3,L4,L5 DORSAL RAMUS RADIOFRQUENCY ABLATION WITH FLUOROSCOPY performed by Sharon Cervantes MD at [address] [facility]  MOUTH SURGERY  02/2019    Uvula lesion, papilloma, Dr. Jeff Montelongo  8/2010    SKIN CANCER EXCISION Right 2017    [facility]. Medications/Allergies     Medication Sig    Testosterone (ANDROGEL PUMP) 20.25 MG/ACT (1.62%) GEL gel Place 2 actuation onto the skin daily for 90 days.  fenofibrate 160 MG tablet Take 160 mg by mouth daily    metoprolol succinate (TOPROL XL) 100 MG extended release tablet TAKE 1 TABLET BY MOUTH DAILY    clomiPRAMINE (ANAFRANIL) 75 MG capsule TAKE 2 CAPSULES BY MOUTH NIGHTLY    aspirin 81 MG tablet Take 81 mg by mouth daily    Cholecalciferol (VITAMIN D3) 2000 UNITS CAPS Take 1 capsule by mouth daily         No Known Allergies      Substance Use History     Social History     Tobacco Use    Smoking status: Never Smoker    Smokeless tobacco: Never Used   Substance Use Topics    Alcohol use: No     Comment: Quit 11/3/1992    Drug use: No     Comment: 1992 Narcotic, Benzo, alcohol addition      Patient drinks about 1 pot of coffee per day. Family History     Family History   Problem Relation Age of Onset    High Blood Pressure Mother     Heart Disease Mother         a fib    Heart Disease Father     Diabetes Father                CareTeam (Including outside providers/suppliers regularly involved in providing care):   Patient Care Team:  Jasmine Martinez DO as PCP - [address] [place] [address] as PCP - [facility] Empaneled Provider  Pankaj Khan MD as Consulting Physician (Gastroenterology)  Lauren Gutierrez MD as Consulting Physician (Ophthalmology)  Dat Morgan MD (Vascular Surgery)  Rayna Painter MD as Consulting Physician (Pain Management)  Brandyn Moon (Nurse Practitioner)      Based upon direct observation of the patient, evaluation of cognition reveals recent and remote memory intact. ROS:  GEN:  Lost 28# in the last 6 months. MS: Right leg and low back. Right leg pain located in the anterior thigh.   Pain is a deep ache.  Pain is worse with prolonged sitting, driving. Betting with walking. Thigh pain is new over the last 2 months. H/O RF ablation with significant relief. NEURO:  No N/T.  +Occasional lightheadedness when standing from a seated position. No BP check at home. No Syncope. Patient has had problems with lightheadedness off and on over the years. PSYCH:  He has been in alcohol, Benzo, Codeine recovery for 27 years. He feels he is a good spot. He continues to attend home meetings and the [employer] as well as [employer]. He was on Toprol for panic attacks. No further depression and anxiety. He has been on Anafranil. Stress level is much less since retiring. He tried to reduce med on his own 2-3 years ago. Patient wants to try and wean off medications. Physical Exam    Vitals:    02/10/20 0754 02/10/20 0837 02/10/20 0838 02/10/20 0839   BP: 115/60 110/60 (!) 102/58 (!) 80/50   Position:  Supine Sitting Standing   Pulse: 72 68 80 80   Resp: 12      Weight: 179 lb (81.2 kg)      Height: 5' 9\" (1.753 m)        Body mass index is 26.43 kg/m². Wt Readings from Last 3 Encounters:   02/10/20 179 lb (81.2 kg)   08/08/19 185 lb (83.9 kg)   08/01/19 207 lb 0.2 oz (93.9 kg)     BP Readings from Last 3 Encounters:   02/10/20 (!) 80/50   11/27/19 120/68   08/08/19 120/72       GEN:  78 y.o. male who is in NAD, A&O. He appears stated age and well nourished. He is cooperative and pleasant. HEAD:  NC/AT, no lesions. EYES:  ERUM, EOMI, No scleral icterus or conjunctival injection or discharge. Visual fields in tact to confrontation. Fundoscopic (non-dilated) grossly normal.  Disc margins well demarcated. EARS:  EAC's clear, TM's normal.  NOSE:  Nasal cavity is clear. No mucosal congestion or discharge. Sinuses are nontender. MOUTH & THROAT:  Oral cavity is clear without mucosal lesions. Tongue is midline. Dentition is in good repair. No pharyngeal erythema or exudate. NECK:  Supple. Full ROM. Trachea is midline. No increased JVD. No thyromegaly or nodules. No masses  LYMPH: No C/SC/A/F nodes  CARDIAC:  S1S2 NL. Regular rhythm. No murmur/clicks/rubs. No ectopy. PMI is non-displaced. VASC:  Pedal pulses 2/4. Carotid upstrokes 2+. No bruits noted. Lower extremity dilated venules, spider veins, varicose veins  PULM:  Lungs are CTA. Symmetric breath sounds noted. AP Diameter NL. GI:  Abdomen is soft and nontender. No distension. No organomegaly. No masses. No pulsatile masses. EXT:  No Cyanosis or clubbing. No edema. SKIN: Warm and dry, normal turgor, no rash or lesions of concern. NEURO:  Cranial nerves 2-12 are NL. Speech fluent and coherent. Strength is 5/5 in all muscle groups. No sensory deficits. No focal or lateralizing deficits. Reflexes 2/4 and symmetric. Gait is normal.  MS:  No C/T/L paraspinal tenderness. No scoliosis. No joint effusions. Full joint ROM. PSYCH:  Mood and affect NL. Judgement and insight NL. Patient's complete Health Risk Assessment and screening values have been reviewed and are found in Flowsheets. The following problems were reviewed today and where indicated follow up appointments were made and/or referrals ordered. Positive Risk Factor Screenings with Interventions:     General Health:  General  In general, how would you say your health is?: Very Good  In the past 7 days, have you experienced any of the following? New or Increased Pain, New or Increased Fatigue, Loneliness, Social Isolation, Stress or Anger?: (!) New or Increased Pain  Do you get the social and emotional support that you need?: Yes  Do you have a Living Will?: Yes  General Health Risk Interventions:  · Pain issues: Right low back and right leg. Seeing Dr. Dwayne Guevara.       Hearing/Vision:  No exam data present  Hearing/Vision  Do you or your family notice any trouble with your hearing?: (!) Yes  Do you have difficulty driving, watching TV, or doing any Aged Out    Hib vaccine  Aged Out    Meningococcal (ACWY) vaccine  Aged Out     Recommendations for [employer] Due: see orders and patient instructions/AVS.  . Recommended screening schedule for the next 5-10 years is provided to the patient in written form: see Patient Instructions/AVS.    Lorena Beck was seen today for medicare aw. Diagnoses and all orders for this visit:    Routine general medical examination at a health care facility  -     CO Intens behave ther cardio dx, 15 minutes []  -     CBC Auto Differential; Future  -     Comprehensive Metabolic Panel; Future  -     Lipid Panel; Future  -     TSH without Reflex; Future  -     Psa screening; Future  -     Testosterone; Future    Screening for cardiovascular condition  -     CO Intens behave ther cardio dx, 15 minutes []    Orthostatic hypotension  --Etiology is unclear. May be a combination of beta-blocker plus dehydration in the setting of significant weight loss also consider dysautonomia and adverse side effect from medication. --Decrease Toprol-XL to 50 mg daily. --Wear compression stockings. -     metoprolol succinate (TOPROL XL) 100 MG extended release tablet; TAKE 1/2 TABLET BY MOUTH DAILY  -     CBC Auto Differential; Future  -     Comprehensive Metabolic Panel; Future    Screening for diabetes mellitus  -     Comprehensive Metabolic Panel; Future    Dyslipidemia  -     Lipid Panel; Future    Benign essential HTN  -     CBC Auto Differential; Future  -     Comprehensive Metabolic Panel; Future  -     Lipid Panel; Future  -     TSH without Reflex; Future    Lumbar radiculopathy    Screening for prostate cancer  -     Psa screening; Future    Hypotestosteronism  -     Testosterone; Future    Major depressive disorder with single episode, in full remission ([facility]  History of substance abuse (alcohol, benzodiazepine, opioid). Sober for 27 years. Try decreasing Anafranil to 100 mg nightly. Slow wean.   Monitor for increased

## 2020-02-10 NOTE — PATIENT INSTRUCTIONS
Advance Directives: Care Instructions  Your Care Instructions  An advance directive is a legal way to state your wishes at the end of your life. It tells your family and your doctor what to do if you can no longer say what you want. There are two main types of advance directives. You can change them any time that your wishes change. · A living will tells your family and your doctor your wishes about life support and other treatment. · A durable power of  for health care lets you name a person to make treatment decisions for you when you can't speak for yourself. This person is called a health care agent. If you do not have an advance directive, decisions about your medical care may be made by a doctor or a  who doesn't know you. It may help to think of an advance directive as a gift to the people who care for you. If you have one, they won't have to make tough decisions by themselves. Follow-up care is a key part of your treatment and safety. Be sure to make and go to all appointments, and call your doctor if you are having problems. It's also a good idea to know your test results and keep a list of the medicines you take. How can you care for yourself at home? · Discuss your wishes with your loved ones and your doctor. This way, there are no surprises. · Many states have a unique form. Or you might use a universal form that has been approved by many states. This kind of form can sometimes be completed and stored online. Your electronic copy will then be available wherever you have a connection to the Internet. In most cases, doctors will respect your wishes even if you have a form from a different state. · You don't need a  to do an advance directive. But you may want to get legal advice. · Think about these questions when you prepare an advance directive:  ? Who do you want to make decisions about your medical care if you are not able to?  Many people choose a family member or Lack of fluids (dehydration) or illnesses such as diabetes or heart disease also can cause it. Follow-up care is a key part of your treatment and safety. Be sure to make and go to all appointments, and call your doctor if you are having problems. It's also a good idea to know your test results and keep a list of the medicines you take. How can you care for yourself at home? Tell your doctor about any problems you have with your medicines. If your doctor prescribes medicine to help prevent a low blood pressure problem, take it exactly as prescribed. Call your doctor if you think you are having a problem with your medicine. Drink plenty of fluids, enough so that your urine is light yellow or clear like water. Choose water and other caffeine-free clear liquids. If you have kidney, heart, or liver disease and have to limit fluids, talk with your doctor before you increase the amount of fluids you drink. Limit or avoid alcohol and caffeine. Get up slowly from bed or after sitting for a long time. If you are in bed, roll to your side and swing your legs over the edge of the bed and onto the floor. Push your body up to a sitting position. Wait for a while before you slowly stand up. If you are dizzy or lightheaded, sit or lie down. When should you call for help? Call 911 anytime you think you may need emergency care. For example, call if:    You passed out (lost consciousness).    Watch closely for changes in your health, and be sure to contact your doctor if:    You do not get better as expected. Where can you learn more? Go to https://MegaBitsgilmar.Bluechilli. org and sign in to your Ernie's account. Enter Z495 in the [employer] box to learn more about \"Orthostatic Hypotension: Care Instructions. \"     If you do not have an account, please click on the \"Sign Up Now\" link. Current as of: April 9, 2019  Content Version: 12.3  © 7841-5150 Healthwise, Incorporated.  Care instructions adapted under license by [facility] ([facility]). If you have questions about a medical condition or this instruction, always ask your healthcare professional. [address] any warranty or liability for your use of this information. Patient Education        Prostate Cancer Screening: Care Instructions  Your Care Instructions    The prostate gland is an organ found just below a man's bladder. It is the size and shape of a walnut. It surrounds the tube that carries urine from the bladder out of the body through the penis. This tube is called the urethra. Prostate cancer is the abnormal growth of cells in the prostate. It is the second most common type of cancer in men. (Skin cancer is the most common.)  Most cases of prostate cancer occur in men older than 72. The disease runs in families. And it's more common in -American men. When it's found and treated early, prostate cancer may be cured. But it is not always treated. This is because prostate cancer may not shorten your life, especially if you are older and the cancer is growing slowly. Follow-up care is a key part of your treatment and safety. Be sure to make and go to all appointments, and call your doctor if you are having problems. It's also a good idea to know your test results and keep a list of the medicines you take. What are the screening tests for prostate cancer? The main screening test for prostate cancer is the prostate-specific antigen (PSA) test. This is a blood test that measures how much PSA is in your blood. A high level may mean that you have an enlargement, an infection, or cancer. Along with the PSA test, you may have a digital rectal exam. The digital (finger) rectal exam checks for anything abnormal in your prostate. To do the exam, the doctor puts a lubricated, gloved finger into your rectum. If these tests suggest cancer, you may need a prostate biopsy. How is prostate cancer diagnosed?   In a biopsy, the doctor takes small tissue samples from your prostate gland. Another doctor then looks at the tissue under a microscope to see if there are cancer cells, signs of infection, or other problems. The results help diagnose prostate cancer. What are the pros and cons of screening? Neither a PSA test nor a digital rectal exam can tell you for sure that you do or do not have cancer. But they can help you decide if you need more tests, such as a prostate biopsy. Screening tests may be useful because most men with prostate cancer don't have symptoms. It can be hard to know if you have cancer until it is more advanced. And then it's harder to treat. But having a PSA test can also cause harm. The test may show high levels of PSA that aren't caused by cancer. So you could have a prostate biopsy you didn't need. Or the PSA test might be normal when there is cancer, so a cancer might not be found early. The test can also find cancers that would never have caused a problem during your lifetime. So you might have treatment that was not needed. Prostate cancer usually develops late in life and grows slowly. For many men, it does not shorten their lives. Some experts advise screening only for men who are at high risk. Talk with your doctor to see if screening is right for you. Where can you learn more? Go to https://SuperData Research.Empow Studios. org and sign in to your VIRID[employer]IS account. Enter R550 in the [employer]books box to learn more about \"Prostate Cancer Screening: Care Instructions. \"     If you do not have an account, please click on the \"Sign Up Now\" link. Current as of: August 21, 2019  Content Version: 12.3  © 1951-8903 Healthwise, Incorporated. Care instructions adapted under license by [facility] ([facility]). If you have questions about a medical condition or this instruction, always ask your healthcare professional. [place] [address] any warranty or liability for your use of this information.          Patient Education        Well Visit, Over 72: Care Instructions  Your Care Instructions    Physical exams can help you stay healthy. Your doctor has checked your overall health and may have suggested ways to take good care of yourself. He or she also may have recommended tests. At home, you can help prevent illness with healthy eating, regular exercise, and other steps. Follow-up care is a key part of your treatment and safety. Be sure to make and go to all appointments, and call your doctor if you are having problems. It's also a good idea to know your test results and keep a list of the medicines you take. How can you care for yourself at home? Reach and stay at a healthy weight. This will lower your risk for many problems, such as obesity, diabetes, heart disease, and high blood pressure. Get at least 30 minutes of exercise on most days of the week. Walking is a good choice. You also may want to do other activities, such as running, swimming, cycling, or playing tennis or team sports. Do not smoke. Smoking can make health problems worse. If you need help quitting, talk to your doctor about stop-smoking programs and medicines. These can increase your chances of quitting for good. Protect your skin from too much sun. When you're outdoors from 10 a.m. to 4 p.m., stay in the shade or cover up with clothing and a hat with a wide brim. Wear sunglasses that block UV rays. Even when it's cloudy, put broad-spectrum sunscreen (SPF 30 or higher) on any exposed skin. See a dentist one or two times a year for checkups and to have your teeth cleaned. Wear a seat belt in the car. Follow your doctor's advice about when to have certain tests. These tests can spot problems early. For men and women  Cholesterol. Your doctor will tell you how often to have this done based on your overall health and other things that can increase your risk for heart attack and stroke. Blood pressure.  Have your blood pressure checked during a routine doctor visit. Your doctor will tell you how often to check your blood pressure based on your age, your blood pressure results, and other factors. Diabetes. Ask your doctor whether you should have tests for diabetes. Vision. Experts recommend that you have yearly exams for glaucoma and other age-related eye problems. Hearing. Tell your doctor if you notice any change in your hearing. You can have tests to find out how well you hear. Colon cancer tests. Keep having colon cancer tests as your doctor recommends. You can have one of several types of tests. Heart attack and stroke risk. At least every 4 to 6 years, you should have your risk for heart attack and stroke assessed. Your doctor uses factors such as your age, blood pressure, cholesterol, and whether you smoke or have diabetes to show what your risk for a heart attack or stroke is over the next 10 years. Osteoporosis. Talk to your doctor about whether you should have a bone density test to find out whether you have thinning bones. Also ask your doctor about whether you should take calcium and vitamin D supplements. For women  Pap test and pelvic exam. You may no longer need a Pap test. Talk with your doctor about whether to stop or continue to have Pap tests. Breast exam and mammogram. Ask how often you should have a mammogram, which is an X-ray of your breasts. A mammogram can spot breast cancer before it can be felt and when it is easiest to treat. Thyroid disease. Talk to your doctor about whether to have your thyroid checked as part of a regular physical exam. Women have an increased chance of a thyroid problem. For men  Prostate exam. Talk to your doctor about whether you should have a blood test (called a PSA test) for prostate cancer.  Experts recommend that you discuss the benefits and risks of the test with your doctor before you decide whether to have this test. Some experts say that men ages 79 and older no longer need testing. Abdominal aortic aneurysm. Ask your doctor whether you should have a test to check for an aneurysm. You may need a test if you ever smoked or if your parent, brother, sister, or child has had an aneurysm. When should you call for help? Watch closely for changes in your health, and be sure to contact your doctor if you have any problems or symptoms that concern you. Where can you learn more? Go to https://RecentPoker.compeAdvanced Inquiry Systems Inc.eb.Nuhook. org and sign in to your Kleek account. Enter Z928 in the [employer] box to learn more about \"Well Visit, Over 65: Care Instructions. \"     If you do not have an account, please click on the \"Sign Up Now\" link. Current as of: August 21, 2019  Content Version: 12.3  © 0636-0852 Healthwise, Incorporated. Care instructions adapted under license by [facility] ([facility]). If you have questions about a medical condition or this instruction, always ask your healthcare professional. [address] any warranty or liability for your use of this information.

## 2020-02-13 ENCOUNTER — OFFICE VISIT (OUTPATIENT)
Dept: ORTHOPEDIC SURGERY | Age: 70
End: 2020-02-13
Payer: MEDICARE

## 2020-02-13 VITALS
BODY MASS INDEX: 26.51 KG/M2 | SYSTOLIC BLOOD PRESSURE: 117 MMHG | WEIGHT: 179.01 LBS | HEART RATE: 69 BPM | HEIGHT: 69 IN | DIASTOLIC BLOOD PRESSURE: 69 MMHG

## 2020-02-13 PROCEDURE — G8417 CALC BMI ABV UP PARAM F/U: HCPCS | Performed by: PHYSICAL MEDICINE & REHABILITATION

## 2020-02-13 PROCEDURE — G8427 DOCREV CUR MEDS BY ELIG CLIN: HCPCS | Performed by: PHYSICAL MEDICINE & REHABILITATION

## 2020-02-13 PROCEDURE — 1123F ACP DISCUSS/DSCN MKR DOCD: CPT | Performed by: PHYSICAL MEDICINE & REHABILITATION

## 2020-02-13 PROCEDURE — 99213 OFFICE O/P EST LOW 20 MIN: CPT | Performed by: PHYSICAL MEDICINE & REHABILITATION

## 2020-02-13 PROCEDURE — 3017F COLORECTAL CA SCREEN DOC REV: CPT | Performed by: PHYSICAL MEDICINE & REHABILITATION

## 2020-02-13 PROCEDURE — 1036F TOBACCO NON-USER: CPT | Performed by: PHYSICAL MEDICINE & REHABILITATION

## 2020-02-13 PROCEDURE — 4040F PNEUMOC VAC/ADMIN/RCVD: CPT | Performed by: PHYSICAL MEDICINE & REHABILITATION

## 2020-02-13 PROCEDURE — G8484 FLU IMMUNIZE NO ADMIN: HCPCS | Performed by: PHYSICAL MEDICINE & REHABILITATION

## 2020-02-13 NOTE — PROGRESS NOTES
bilateral low back pain without sciatica 2/8/2019    DDD (degenerative disc disease), lumbar 2/8/2019    Depression     DVT, lower extremity ([address] 5/29/2015    Right, gastroc, soleal    Hyperlipidemia     Lumbar facet arthropathy 2/15/2019    Lumbar foraminal stenosis 2/15/2019    Osteopenia     Squamous cell skin cancer 2017    right ear lobe    Stasis dermatitis     Varicose veins of both lower extremities     Venous insufficiency of both lower extremities       Past Surgical History:     Past Surgical History:   Procedure Laterality Date    ANESTHESIA NERVE BLOCK Bilateral 3/25/2019    BILATERAL L3,L4,L5 DR MEDIAL BRANCH BLOCKS WITH FLUOROSCOPY performed by Kirsten Ross MD at [address] [facility]      from [address] [facility] Bilateral 3/4/2019    BILATERAL L3, L4, L5 DORSAL RAMUS LUMBAR MEDIAL BRANCH BLOCK WITH FLUOROSCOPY performed by Kirsten Ross MD at [facility] Bilateral 6/3/2019    BILATERAL L3,L4,L5 DORSAL RAMUS RADIOFRQUENCY ABLATION WITH FLUOROSCOPY performed by Kirsten Ross MD at [address]  02/2019    Uvula lesion, papilloma, Dr. Judith Ford  8/2010    SKIN CANCER EXCISION Right 2017    Debra Venegasee. Current Medications:     Current Outpatient Medications:     metoprolol succinate (TOPROL XL) 100 MG extended release tablet, TAKE 1/2 TABLET BY MOUTH DAILY, Disp: 45 tablet, Rfl: 3    clomiPRAMINE (ANAFRANIL) 50 MG capsule, Take 2 capsules by mouth nightly for 14 days, Disp: 30 capsule, Rfl: 0    Testosterone (ANDROGEL PUMP) 20.25 MG/ACT (1.62%) GEL gel, Place 2 actuation onto the skin daily for 90 days. , Disp: 3 Bottle, Rfl: 0    fenofibrate 160 MG tablet, Take 160 mg by mouth daily, Disp: , Rfl:     aspirin 81 MG tablet, Take 81 mg by mouth daily, Disp: , Rfl:     Cholecalciferol (VITAMIN D3) 2000 UNITS CAPS, Take 1 capsule by mouth daily, Disp: , Rfl:   Allergies:  Patient has no

## 2020-02-24 ENCOUNTER — OFFICE VISIT (OUTPATIENT)
Dept: INTERNAL MEDICINE CLINIC | Age: 70
End: 2020-02-24
Payer: MEDICARE

## 2020-02-24 VITALS
HEART RATE: 80 BPM | BODY MASS INDEX: 26.72 KG/M2 | SYSTOLIC BLOOD PRESSURE: 82 MMHG | OXYGEN SATURATION: 98 % | WEIGHT: 181 LBS | DIASTOLIC BLOOD PRESSURE: 58 MMHG

## 2020-02-24 PROCEDURE — 4040F PNEUMOC VAC/ADMIN/RCVD: CPT | Performed by: INTERNAL MEDICINE

## 2020-02-24 PROCEDURE — 1123F ACP DISCUSS/DSCN MKR DOCD: CPT | Performed by: INTERNAL MEDICINE

## 2020-02-24 PROCEDURE — G8417 CALC BMI ABV UP PARAM F/U: HCPCS | Performed by: INTERNAL MEDICINE

## 2020-02-24 PROCEDURE — 3017F COLORECTAL CA SCREEN DOC REV: CPT | Performed by: INTERNAL MEDICINE

## 2020-02-24 PROCEDURE — 1036F TOBACCO NON-USER: CPT | Performed by: INTERNAL MEDICINE

## 2020-02-24 PROCEDURE — G8484 FLU IMMUNIZE NO ADMIN: HCPCS | Performed by: INTERNAL MEDICINE

## 2020-02-24 PROCEDURE — 99213 OFFICE O/P EST LOW 20 MIN: CPT | Performed by: INTERNAL MEDICINE

## 2020-02-24 PROCEDURE — G8427 DOCREV CUR MEDS BY ELIG CLIN: HCPCS | Performed by: INTERNAL MEDICINE

## 2020-02-24 RX ORDER — CLOMIPRAMINE HYDROCHLORIDE 75 MG/1
75 CAPSULE ORAL NIGHTLY
Qty: 30 CAPSULE | Refills: 3
Start: 2020-02-24 | End: 2020-05-26 | Stop reason: DRUGHIGH

## 2020-02-24 NOTE — PATIENT INSTRUCTIONS
Continue monitoring blood pressure and pulse at home both sitting and standing. If blood pressure continues to drop upon standing, may need tilt table testing. Compression stockings can help support the blood pressure. I would wear these daily and remove at bedtime    Okay to ingest salt but monitor swelling    Drink water and electrolyte drinks such as Smart Water, Gatorade Zero, and water    Return to the office sooner if you develop lightheadedness    Decreasing Anafranil to 75 mg.   Please call with any mood changes

## 2020-02-25 ENCOUNTER — HOSPITAL ENCOUNTER (OUTPATIENT)
Dept: PHYSICAL THERAPY | Age: 70
Setting detail: THERAPIES SERIES
Discharge: HOME | End: 2020-02-25
Payer: MEDICARE

## 2020-02-25 PROCEDURE — 97140 MANUAL THERAPY 1/> REGIONS: CPT

## 2020-02-25 PROCEDURE — 97161 PT EVAL LOW COMPLEX 20 MIN: CPT

## 2020-02-25 NOTE — PLAN OF CARE
[]Constipation (W76.02)   Metabolic conditions   []Morbid obesity (E66.01)  []Diabetes type 1(E10.65) or 2 (E11.65)   []Neuropathy (G60.9)     Pulmonary conditions   []Asthma (J45)  []Coughing   []COPD (J44.9)   Psychological Disorders  [x]Anxiety (F41.9)  [x]Depression (F32.9)   []Other:   []Other:          Barriers to/and or personal factors that will affect rehab potential:              [x]Age  []Sex              []Motivation/Lack of Motivation                        []Co-Morbidities              []Cognitive Function, education/learning barriers              []Environmental, home barriers              []profession/work barriers  []past PT/medical experience  []other:  Justification: chronic low back pain with frequent recurrences     Falls Risk Assessment (30 days):   [x] Falls Risk assessed and no intervention required.   [] Falls Risk assessed and Patient requires intervention due to being higher risk   TUG score (>12s at risk):     [] Falls education provided, including         ASSESSMENT: s/s consistent with low back pain with possible L3-4 facet referral  Functional Impairments:     [x]Noted lumbar/proximal hip hypomobility   []Noted lumbosacral and/or generalized hypermobility   [x]Decreased Lumbosacral/hip/LE functional ROM   []Decreased core/proximal hip strength and neuromuscular control    [x]Decreased LE functional strength    []Abnormal reflexes/sensation/myotomal/dermatomal deficits  []Reduced balance/proprioceptive control    []other:      Functional Activity Limitations (from functional questionnaire and intake)   [x]Reduced ability to tolerate prolonged functional positions   []Reduced ability or difficulty with changes of positions or transfers between positions   []Reduced ability to maintain good posture and demonstrate good body mechanics with sitting, bending, and lifting   []Reduced ability to sleep   [] Reduced ability or tolerance with driving and/or computer work   []Reduced ability to structures and functions (impairments), activity limitations, and/or participation restrictions;:  [x] a total of 1-2 or more elements   [] a total of 3 or more elements   [] a total of 4 or more elements   [x] A clinical presentation with:  [x] stable and/or uncomplicated characteristics   [] evolving clinical presentation with changing characteristics  [] unstable and unpredictable characteristics;   [x] Clinical decision making of [x] low, [] moderate, [] high complexity using standardized patient assessment instrument and/or measurable assessment of functional outcome. [x] EVAL (LOW) 08004 (typically 30 minutes face-to-face)  [] EVAL (MOD) 39473 (typically 30 minutes face-to-face)  [] EVAL (HIGH) 10059 (typically 45 minutes face-to-face)  [] RE-EVAL     PLAN: Begin PT focusing on: proximal hip mobilizations, LB mobs, LB core activation, proximal hip activation, and HEP    Frequency/Duration:  1-2 days per week for 4-6 Weeks:  Interventions:  [x]  Therapeutic exercise including: strength training, ROM, for LE, Glutes and core   [x]  NMR activation and proprioception for glutes , LE and Core   [x]  Manual therapy as indicated for Hip complex, LE and spine to include: Dry Needling/IASTM, STM, PROM, Gr I-IV mobilizations, manipulation. [x]  Modalities as needed that may include: thermal agents, E-stim, Biofeedback, US, iontophoresis as indicated  [x]  Patient education on joint protection, postural re-education, activity modification, progression of HEP. HEP instruction: Patient instructed in, and demonstrated proper form of, exercises. Copy of exercises scanned into media file  (see scanned forms)    GOALS:  Patient stated goal: decrease thigh pain  [] Progressing: [] Met: [] Not Met: [] Adjusted    Therapist goals for Patient:   Short Term Goals: To be achieved in: 2 weeks  1. Independent in HEP and progression per patient tolerance, in order to prevent re-injury.    [] Progressing: [] Met: [] Not Met: [] Adjusted  2. Patient will have a decrease in pain to facilitate improvement in movement, function, and ADLs as indicated by Functional Deficits. [] Progressing: [] Met: [] Not Met: [] Adjusted    Long Term Goals: To be achieved in: 4-6 weeks  1. Disability index score of 8% or less for the MEI to assist with reaching prior level of function. [] Progressing: [] Met: [] Not Met: [] Adjusted  2. Patient will demonstrate increased AROM to WNL, good LS mobility, good hip ROM to allow for proper joint functioning as indicated by patients Functional Deficits. [] Progressing: [] Met: [] Not Met: [] Adjusted  3. Patient will demonstrate an increase in Strength to good proximal hip and core activation to allow for proper functional mobility as indicated by patients Functional Deficits. [] Progressing: [] Met: [] Not Met: [] Adjusted  4. Patient will return to sitting x 30 min without increased symptoms or restriction. [] Progressing: [] Met: [] Not Met: [] Adjusted  5. Pt will tolerate driving x 30 min without increased symptoms    [] Progressing: [] Met: [] Not Met: [] Adjusted     Electronically signed by:   Annette Murray PT

## 2020-02-25 NOTE — FLOWSHEET NOTE
Provided verbal/tactile cueing for activities related to strengthening, flexibility, endurance, ROM  for improvements in proximal hip and core control with self care, mobility, lifting and ambulation.  [] (25014) Provided verbal/tactile cueing for activities related to improving balance, coordination, kinesthetic sense, posture, motor skill, proprioception  to assist with core control in self care, mobility, lifting, and ambulation. Therapeutic Activities:    [x] (30885 or 29243) Provided verbal/tactile cueing for activities related to improving balance, coordination, kinesthetic sense, posture, motor skill, proprioception and motor activation to allow for proper function  with self care and ADLs  [] (80538) Provided training and instruction to the patient for proper core and proximal hip recruitment and positioning with ambulation re-education     Home Exercise Program:    [x] (94803) Reviewed/Progressed HEP activities related to strengthening, flexibility, endurance, ROM of core, proximal hip and LE for functional self-care, mobility, lifting and ambulation   [] (65058) Reviewed/Progressed HEP activities related to improving balance, coordination, kinesthetic sense, posture, motor skill, proprioception of core, proximal hip and LE for self care, mobility, lifting, and ambulation      Manual Treatments:  PROM / STM / Oscillations-Mobs:  G-I, II, III, IV (PA's, Inf., Post.)  [x] (46371) Provided manual therapy to mobilize proximal hip and LS spine soft tissue/joints for the purpose of modulating pain, promoting relaxation,  increasing ROM, reducing/eliminating soft tissue swelling/inflammation/restriction, improving soft tissue extensibility and allowing for proper ROM for normal function with self care, mobility, lifting and ambulation.      Modalities:       Charges:  Timed Code Treatment Minutes: eval +15   Total Treatment Minutes: 60       [x] EVAL (LOW) 48900 (typically 20 minutes face-to-face)  [] EVAL

## 2020-02-27 ENCOUNTER — HOSPITAL ENCOUNTER (OUTPATIENT)
Dept: PHYSICAL THERAPY | Age: 70
Setting detail: THERAPIES SERIES
Discharge: HOME | End: 2020-02-27
Payer: MEDICARE

## 2020-02-27 PROCEDURE — 97140 MANUAL THERAPY 1/> REGIONS: CPT

## 2020-02-27 PROCEDURE — 97110 THERAPEUTIC EXERCISES: CPT

## 2020-02-27 NOTE — FLOWSHEET NOTE
[facility]    Physical Therapy Treatment Note/ Progress Report:     Date:  2020    Patient Name:  Paula Reyna    :  1950  MRN: [id number]  Restrictions/Precautions:    Medical/Treatment Diagnosis Information:  · Diagnosis: lumbar radiculopathy  · Treatment Diagnosis: low back pain  Insurance/Certification information:  PT Insurance Information: Medicare  Physician Information:  Referring Practitioner: Jamel Rodriguez MD  Plan of care signed (Y/N):     Date of Patient follow up with Physician:      Progress Report: []  Yes  [x]  No     Functional Scale:  2020 - oswestry 16% disability    Date Range for reporting period:  Beginnin20  Ending:      Progress report due (10 Rx/or 30 days whichever is less):      Recertification due (POC duration/ or 90 days whichever is less): 4/15/2020     Visit # Insurance Allowable Auth Needed   2 medicare []Yes    []No     Pain level:  2/10 sitting    SUBJECTIVE:  Pt states that his leg aching has been much better. Was sitting in hospital most of the day yesterday with only minimal right low back pain and R thigh aching.      OBJECTIVE: See eval   Observation:    Test measurements:      RESTRICTIONS/PRECAUTIONS: possible lumbar facet referral  If not improved npv, r/o NAVARRO    Exercises/Interventions:     Therapeutic Ex Wt / Resistance sets/sec reps notes   Hand/heel rocking  1 10x    Articulated bridge  2 10    LTR  1 15    UTR  1 15    Hip flexor stretch, EOB  30\" 2x           TRX squats  1 20    SB chop pulldown red 1 20                                                            Manual Intervention  15'              Prone PA  5 min  Gr II, CPa and UPA glides   GISTM/STM       Lumbar Manip gapping 10 min  Gr III, bilateral   SI Manip       Hip belt mobs       Hip LA distraction              NMR re-education        Mf Activation- re-ed       TrA Re-ed activation       Glute Max re-ed activation                       Therapeutic Exercise and NMR EXR  [x] (55309) Provided verbal/tactile cueing for activities related to strengthening, flexibility, endurance, ROM  for improvements in proximal hip and core control with self care, mobility, lifting and ambulation.  [] (71162) Provided verbal/tactile cueing for activities related to improving balance, coordination, kinesthetic sense, posture, motor skill, proprioception  to assist with core control in self care, mobility, lifting, and ambulation. Therapeutic Activities:    [x] (06806 or 48422) Provided verbal/tactile cueing for activities related to improving balance, coordination, kinesthetic sense, posture, motor skill, proprioception and motor activation to allow for proper function  with self care and ADLs  [] (77441) Provided training and instruction to the patient for proper core and proximal hip recruitment and positioning with ambulation re-education     Home Exercise Program:    [x] (30319) Reviewed/Progressed HEP activities related to strengthening, flexibility, endurance, ROM of core, proximal hip and LE for functional self-care, mobility, lifting and ambulation   [] (07107) Reviewed/Progressed HEP activities related to improving balance, coordination, kinesthetic sense, posture, motor skill, proprioception of core, proximal hip and LE for self care, mobility, lifting, and ambulation      Manual Treatments:  PROM / STM / Oscillations-Mobs:  G-I, II, III, IV (PA's, Inf., Post.)  [x] (72313) Provided manual therapy to mobilize proximal hip and LS spine soft tissue/joints for the purpose of modulating pain, promoting relaxation,  increasing ROM, reducing/eliminating soft tissue swelling/inflammation/restriction, improving soft tissue extensibility and allowing for proper ROM for normal function with self care, mobility, lifting and ambulation.      Modalities:       Charges:  Timed Code Treatment Minutes: 45   Total Treatment Minutes: 45

## 2020-03-03 ENCOUNTER — HOSPITAL ENCOUNTER (OUTPATIENT)
Dept: PHYSICAL THERAPY | Age: 70
Setting detail: THERAPIES SERIES
Discharge: HOME | End: 2020-03-03
Payer: MEDICARE

## 2020-03-03 PROCEDURE — 97110 THERAPEUTIC EXERCISES: CPT

## 2020-03-03 PROCEDURE — 97140 MANUAL THERAPY 1/> REGIONS: CPT

## 2020-03-03 NOTE — FLOWSHEET NOTE
[facility]    Physical Therapy Treatment Note/ Progress Report:     Date:  3/3/2020    Patient Name:  Kadeem Lujan    :  1950  MRN: [id number]  Restrictions/Precautions:    Medical/Treatment Diagnosis Information:  · Diagnosis: lumbar radiculopathy  · Treatment Diagnosis: low back pain  Insurance/Certification information:  PT Insurance Information: Medicare  Physician Information:  Referring Practitioner: Aleksey Ortega MD  Plan of care signed (Y/N):     Date of Patient follow up with Physician:      Progress Report: []  Yes  [x]  No     Functional Scale:  2020 - oswestry 16% disability    Date Range for reporting period:  Beginnin20  Ending:      Progress report due (10 Rx/or 30 days whichever is less):      Recertification due (POC duration/ or 90 days whichever is less): 4/15/2020     Visit # Insurance Allowable Auth Needed   3 medicare []Yes    []No     Pain level:  2-3/10 driving    SUBJECTIVE:  Only has pain now with sitting and driving. Less intense than previous bouts. More anterior thigh than posterior now.      OBJECTIVE:    Observation:    Test measurements:      RESTRICTIONS/PRECAUTIONS: possible lumbar facet referral  If not improved npv, r/o NAVARRO    Exercises/Interventions:     Therapeutic Ex Wt / Resistance sets/sec reps notes   Hand/heel rocking  1 10x    Articulated bridge  2 10    LTR  1 15    UTR  1 15    Hip flexor stretch, EOB  30\" 2x    EOB hip extension  3 10    TRX squats  1 20 To chair   SB chop pulldown green 1 20    pallof press green 1 20    Hip hinge with dowel  2 10                                              Manual Intervention  15'              Prone PA  5 min  Gr II, CPa and UPA glides   GISTM/STM       Lumbar Manip gapping 10 min  Gr III, bilateral   SI Manip       Hip belt mobs       Hip LA distraction              NMR re-education        Mf Activation- re-ed       TrA Re-ed

## 2020-03-05 ENCOUNTER — HOSPITAL ENCOUNTER (OUTPATIENT)
Dept: PHYSICAL THERAPY | Age: 70
Setting detail: THERAPIES SERIES
Discharge: HOME | End: 2020-03-05
Payer: MEDICARE

## 2020-03-05 PROCEDURE — 97110 THERAPEUTIC EXERCISES: CPT

## 2020-03-05 PROCEDURE — 97140 MANUAL THERAPY 1/> REGIONS: CPT

## 2020-03-05 NOTE — FLOWSHEET NOTE
Charges:  Timed Code Treatment Minutes: 40   Total Treatment Minutes: 40       [] EVAL (LOW) 60726 (typically 20 minutes face-to-face)  [] EVAL (MOD) 30593 (typically 30 minutes face-to-face)  [] EVAL (HIGH) 07794 (typically 45 minutes face-to-face)  [] RE-EVAL     [x] VK(99492) x   2  [] IONTO (92229)  [] NMR (51449) x     [] VASO (97591)  [x] Manual (63269) x 1    [] Other:  [] TA (64671)x     [] Mech Traction (32568)  [] ES(attended) (84646)     [] ES (un) (25935): If BWC Please Indicate Time In/Out  CPT Code Time in Time out                                   Goals:   Patient stated goal: decrease thigh pain  [] Progressing: [] Met: [] Not Met: [] Adjusted    Therapist goals for Patient:   Short Term Goals: To be achieved in: 2 weeks  1. Independent in HEP and progression per patient tolerance, in order to prevent re-injury. [] Progressing: [x] Met: [] Not Met: [] Adjusted  2. Patient will have a decrease in pain to facilitate improvement in movement, function, and ADLs as indicated by Functional Deficits. [] Progressing: [x] Met: [] Not Met: [] Adjusted    Long Term Goals: To be achieved in: 4-6 weeks  1. Disability index score of 8% or less for the MEI to assist with reaching prior level of function. [] Progressing: [x] Met: [] Not Met: [] Adjusted  2. Patient will demonstrate increased AROM to WNL, good LS mobility, good hip ROM to allow for proper joint functioning as indicated by patients Functional Deficits. [] Progressing: [x] Met: [] Not Met: [] Adjusted  3. Patient will demonstrate an increase in Strength to good proximal hip and core activation to allow for proper functional mobility as indicated by patients Functional Deficits. [] Progressing: [x] Met: [] Not Met: [] Adjusted  4. Patient will return to sitting x 30 min without increased symptoms or restriction. [] Progressing: [x] Met: [] Not Met: [] Adjusted  5.  Pt will tolerate driving x 30 min without increased symptoms    [] Progressing: [x] Met: [] Not Met: [] Adjusted     Progression Towards Functional goals:  [x] Patient is progressing as expected towards functional goals listed. [] Progression is slowed due to complexities listed. [] Progression has been slowed due to co-morbidities. [] Plan just implemented, too soon to assess goals progression  [] Other:     ASSESSMENT:  Pt's symptoms have resolved and he has met all of his functional goals. He demonstrates good compliance with his HEP and was educated on how to use stretches and exercises for acute pain relief if pain were to recur. Pt is a good candidate for d/c to HEP at this time. Treatment/Activity Tolerance:  [x] Patient tolerated treatment well [] Patient limited by fatique  [] Patient limited by pain  [] Patient limited by other medical complications  [] Other:     Overall Progression Towards Functional goals/ Treatment Progress Update:  [] Patient is progressing as expected towards functional goals listed. [] Progression is slowed due to complexities/Impairments listed. [] Progression has been slowed due to co-morbidities. [x] Plan just implemented, too soon to assess goals progression <30days   [] Goals require adjustment due to lack of progress  [] Patient is not progressing as expected and requires additional follow up with physician  [] Other:    Prognosis for POC: [x] Good [] Fair  [] Poor    Patient requires continued skilled intervention: [] Yes  [x] No        PLAN:   [] Continue per plan of care [] Alter current plan (see comments)  [] Plan of care initiated [] Hold pending MD visit [x] Discharge    Electronically signed by: Shiloh Stevenson PT    Note: If patient does not return for scheduled/recommended follow up visits, this note will serve as a discharge from care along with the most recent update on progress.

## 2020-04-06 RX ORDER — TESTOSTERONE 16.2 MG/G
GEL TRANSDERMAL
Qty: 225 G | Refills: 1 | Status: SHIPPED | OUTPATIENT
Start: 2020-04-06 | End: 2021-02-17 | Stop reason: ALTCHOICE

## 2020-04-06 RX ORDER — METOPROLOL SUCCINATE 100 MG/1
TABLET, EXTENDED RELEASE ORAL
Qty: 90 TABLET | OUTPATIENT
Start: 2020-04-06

## 2020-04-06 RX ORDER — FENOFIBRATE 160 MG/1
TABLET ORAL
Qty: 90 TABLET | Refills: 3 | Status: SHIPPED | OUTPATIENT
Start: 2020-04-06 | End: 2021-05-26 | Stop reason: SDUPTHER

## 2020-04-06 RX ORDER — METOPROLOL SUCCINATE 100 MG/1
TABLET, EXTENDED RELEASE ORAL
Qty: 90 TABLET | Refills: 3 | Status: SHIPPED | OUTPATIENT
Start: 2020-04-06 | End: 2020-07-28 | Stop reason: DRUGHIGH

## 2020-04-06 NOTE — TELEPHONE ENCOUNTER
Last appointment: 2/24/2020  Next appointment: 4/4/2020  Last refill: Fenofibrate - 5/14/2019  Androgel Pump - 2/10/2020  toprol XL - 2/10/2020

## 2020-04-30 ENCOUNTER — TELEPHONE (OUTPATIENT)
Dept: INTERNAL MEDICINE CLINIC | Age: 70
End: 2020-04-30

## 2020-05-01 RX ORDER — CLOMIPRAMINE HYDROCHLORIDE 50 MG/1
50 CAPSULE ORAL NIGHTLY
Qty: 30 CAPSULE | Refills: 5 | Status: SHIPPED | OUTPATIENT
Start: 2020-05-01 | End: 2020-07-28 | Stop reason: SDUPTHER

## 2020-05-26 ENCOUNTER — TELEMEDICINE (OUTPATIENT)
Dept: INTERNAL MEDICINE CLINIC | Age: 70
End: 2020-05-26
Payer: MEDICARE

## 2020-05-26 VITALS — SYSTOLIC BLOOD PRESSURE: 82 MMHG | HEART RATE: 85 BPM | DIASTOLIC BLOOD PRESSURE: 64 MMHG

## 2020-05-26 PROCEDURE — G8428 CUR MEDS NOT DOCUMENT: HCPCS | Performed by: INTERNAL MEDICINE

## 2020-05-26 PROCEDURE — 1123F ACP DISCUSS/DSCN MKR DOCD: CPT | Performed by: INTERNAL MEDICINE

## 2020-05-26 PROCEDURE — 4040F PNEUMOC VAC/ADMIN/RCVD: CPT | Performed by: INTERNAL MEDICINE

## 2020-05-26 PROCEDURE — 99214 OFFICE O/P EST MOD 30 MIN: CPT | Performed by: INTERNAL MEDICINE

## 2020-05-26 PROCEDURE — 3017F COLORECTAL CA SCREEN DOC REV: CPT | Performed by: INTERNAL MEDICINE

## 2020-05-26 RX ORDER — CLOMIPRAMINE HYDROCHLORIDE 25 MG/1
25 CAPSULE ORAL NIGHTLY
Qty: 30 CAPSULE | Refills: 1 | Status: SHIPPED | OUTPATIENT
Start: 2020-05-26 | End: 2020-07-28 | Stop reason: DRUGHIGH

## 2020-05-26 ASSESSMENT — ENCOUNTER SYMPTOMS
SHORTNESS OF BREATH: 0
TROUBLE SWALLOWING: 0
NAUSEA: 0
VOMITING: 0
COUGH: 0
DIARRHEA: 0
ABDOMINAL PAIN: 0

## 2020-05-26 NOTE — PROGRESS NOTES
2020    [facility]) Physicians  Internal Medicine  Patient Encounter  Ashlie Schofield D.O., [address] -- Audio/Visual (During CIDSX-49 public health emergency)      I discussed at this telephone/video visit is a nontraditional type of visit that we are conducting in lieu of an office visit to minimize patient risk during the coronavirus pandemic. I discussed with the patient that I would not be able to perform a full physical examination, but that in most other respects the visit would be beneficial to his/her continued medical care. He/She again gave verbal consent for us to conduct this type of visit. Chief Complaint   Patient presents with    Check-Up    Medication Check         HPI:    Annabel Wei (:  1950) has requested an audio/video evaluation for the following concern(s): Checkup and medication review    78 y.o. male being evaluated via virtual video visit due to the coronavirus pandemic emergency and public health crisis and inability to see the patient face-to-face in the office. Patient is being evaluated regarding the status of current chronic medical problems as below along with medication review and reconciliation. Hyperlipidemia:    Lab Results   Component Value Date    LDLCALC 72 02/10/2020    He remains on Fenofibrate 160 mg daily. He is due for lipid profile. His last triglyceride level was 165. LDL was 65. He states his TG levels were 200 when he started. HTN--Patient was originally placed on Toprol-XL for panic attacks. He really never had a diagnosis of high blood pressure. He is now having trouble with orthostatic hypotension. He might be interested in coming down on the Toprol in the coming weeks. He denies any symptoms suggestive of elevated blood pressure. He denies any headaches, dizziness. He does have brief lightheadedness episodes but not very frequent.   He denies any symptoms of unilateral weakness or paresthesias or other symptoms that might suggest stroke or TIA     Venous reflux-- Previous history----> H/O Left calf DVT x 1. He was on Eliquis. He has also had Superficial thrombophlebitis. He was on Ibuprofen for that. He did have a repeat duplex which showed reflux. No surgery recommended. Compression stockings recommended. Duplex--   Impressions   Right Impression   No evidence of deep or superficial venous thrombosis of the right lower   extremity. Evidence of severe venous insufficiency of the right CFV, saphenofemoral   junction (reflux of 0.5 seconds), anterior accessory saphenous vein (reflux of   1 second) that extend to the knee varicosities. Left Impression   No evidence of deep or superficial venous thrombosis of the left lower   extremity. Severe valvular incompetence of the left CFV, saphenofemoral junction (reflux   of 2.7 seconds), great saphenous vein (proximal to mid calf).        Interval history---->he denies swelling. He has been walking for 3 miles daily. He does not wear his compression stockings. These may be helpful for his orthostasis.        Major depressive disorder--Patient has a remote history of alcoholism. Patient remains sober. He has been titrating down on the Anafranil. He has not had any recurrent symptoms of depression, anxiety or OCD. He would like to continue weaning the Anafranil.   He is ready to go down to the 25 mg dose.           Past Medical History:   Diagnosis Date    Acquired spondylolisthesis of lumbosacral region 2/8/2019    Alcoholism ([address]     Anxiety     Basal cell cancer 8/2010    Dr. Daja Sandoval Chronic bilateral low back pain without sciatica 2/8/2019    DDD (degenerative disc disease), lumbar 2/8/2019    Depression     DVT, lower extremity ([address] 5/29/2015    Right, gastroc, soleal    Hyperlipidemia     Lumbar facet arthropathy 2/15/2019    Lumbar foraminal stenosis 2/15/2019    Osteopenia     Squamous cell skin cancer 2017    right ear lobe    Stasis dermatitis     Varicose veins of both lower extremities     Venous insufficiency of both lower extremities        Prior to Visit Medications    Medication Sig Taking? Authorizing Provider   clomiPRAMINE (ANAFRANIL) 50 MG capsule Take 1 capsule by mouth nightly  Keith Valenzuela DO   Testosterone (ANDROGEL) 20.25 MG/ACT (1.62%) GEL gel APPLY TOPICALLY 2 PUMPS  DAILY  Keith Valenzuela DO   fenofibrate (TRIGLIDE) 160 MG tablet TAKE 1 TABLET BY MOUTH  DAILY  Keith Valenzuela DO   metoprolol succinate (TOPROL XL) 100 MG extended release tablet TAKE 1 TABLET BY MOUTH DAILY  Keith Valenzuela DO   clomiPRAMINE (ANAFRANIL) 75 MG capsule Take 1 capsule by mouth nightly  Keith Valenzuela DO   aspirin 81 MG tablet Take 81 mg by mouth daily  Historical Provider, MD   Cholecalciferol (VITAMIN D3) 2000 UNITS CAPS Take 1 capsule by mouth daily  Historical Provider, MD         Review of Systems   Constitutional: Negative for chills, fever and unexpected weight change. HENT: Negative for hearing loss, nosebleeds and trouble swallowing. Eyes: Negative for visual disturbance. Respiratory: Negative for cough and shortness of breath. Cardiovascular: Negative for chest pain, palpitations and leg swelling. No claudication  No Orthopnea  History of venous insufficiency. History of varicose veins   Gastrointestinal: Negative for abdominal pain, diarrhea, nausea and vomiting. Endocrine:        + ED   Genitourinary: Negative for difficulty urinating. Neurological: Positive for light-headedness. Negative for dizziness and headaches. Brief and intermittent orthostatic lightheadedness. Psychiatric/Behavioral:        Patient denies any recurrent anxiety, depression, or excessive thinking. He has been sober for almost 24 years.            PHYSICAL EXAMINATION:  [ INSTRUCTIONS:  \"[x]\" Indicates a positive item  \"[]\" Indicates a negative item  -- DELETE ALL ITEMS NOT EXAMINED]  Vital Signs: (As obtained by patient/caregiver or practitioner observation)    Vitals:    05/26/20 1227 05/26/20 1228 05/26/20 1229   BP: 121/70 106/74 82/64   Position: Supine Sitting Standing   Pulse: 71 76 85     There is no height or weight on file to calculate BMI. Wt Readings from Last 3 Encounters:   02/24/20 181 lb (82.1 kg)   02/13/20 179 lb 0.2 oz (81.2 kg)   02/10/20 179 lb (81.2 kg)     BP Readings from Last 3 Encounters:   05/26/20 82/64   02/24/20 (!) 82/58   02/13/20 117/69           Physical Exam  Constitutional:       General: He is not in acute distress. Appearance: Normal appearance. He is not ill-appearing. HENT:      Head: Normocephalic and atraumatic. Right Ear: External ear normal.      Left Ear: External ear normal.   Eyes:      Extraocular Movements: Extraocular movements intact. Conjunctiva/sclera: Conjunctivae normal.   Neck:      Musculoskeletal: Normal range of motion. Pulmonary:      Effort: Pulmonary effort is normal. No respiratory distress. Abdominal:      General: There is no distension. Skin:     Findings: No rash. Neurological:      Mental Status: He is alert and oriented to person, place, and time. Psychiatric:         Mood and Affect: Mood normal.         Thought Content: Thought content normal.         Judgment: Judgment normal.           Other pertinent observable physical exam findings-     Due to this being a TeleHealth encounter, evaluation of the following organ systems is limited: Vitals/Constitutional/EENT/Resp/CV/GI//MS/Neuro/Skin/Heme-Lymph-Imm. ASSESSMENT/PLAN:  1. Dyslipidemia  Condition stability and control are uncertain at this time. His last lipid profile was excellent. Due for lab  Continue fenofibrate with caution. Consider statin therapy  - Comprehensive Metabolic Panel; Future  - Lipid Panel; Future    2. Venous insufficiency of both lower extremities  Condition is well controlled  Continue to monitor for increasing edema    3. Male hypogonadism  Condition is improved but his last testosterone level was on the low end. Continue testosterone replacement with caution  Check PSA next visit  - Testosterone; Future    4. Major depressive disorder with single episode, in full remission [facility]  Edition is much improved. In addition anxiety and OCD symptoms are resolved. Decrease Anafranil to 25 mg. Patient instructed to monitor for recurrent and intrusive depression and anxiety symptoms. Hopefully with continued weaning of the Anafranil and then possibly weaning Toprol-XL his orthostatic changes will improve  - clomiPRAMINE (ANAFRANIL) 25 MG capsule; Take 1 capsule by mouth nightly  Dispense: 30 capsule; Refill: 1    5. Benign essential HTN  Condition is well controlled on the Toprol-XL  Consider decreasing. 6. Orthostatic hypotension  Suspect Jossue Reid has some dysautonomia. Continue to stay well-hydrated  We will continue to wean Anafranil. Will consider weaning Toprol-XL  Recommend compression stockings which he does not like to wear  Recommended he continue to monitor orthostatic blood pressure and pulse though I have asked him to wait 1 to 2 minutes upon standing before checking the blood pressure  - Comprehensive Metabolic Panel; Future  - CBC Auto Differential; Future    7. Abnormal thyroid blood test  Recheck TSH  - TSH without Reflex; Future    8. Impaired fasting glucose  Condition stability and control are uncertain at this time. Due for lab  Continue a sensible low simple sugar diet and regular exercise  - Comprehensive Metabolic Panel; Future  - Hemoglobin A1C; Future      No follow-ups on file. An  electronic signature was used to authenticate this note.     --Gianna Sim,  on 5/26/2020 at 12:28 PM    119}    Pursuant to the emergency declaration under the [facility], [address] [place] [address] and the [facility] and [employer] Act, this Virtual  Visit

## 2020-05-27 DIAGNOSIS — R79.89 ABNORMAL THYROID BLOOD TEST: ICD-10-CM

## 2020-05-27 DIAGNOSIS — I95.1 ORTHOSTATIC HYPOTENSION: ICD-10-CM

## 2020-05-27 DIAGNOSIS — E29.1 MALE HYPOGONADISM: ICD-10-CM

## 2020-05-27 DIAGNOSIS — R73.01 IMPAIRED FASTING GLUCOSE: ICD-10-CM

## 2020-05-27 DIAGNOSIS — E78.5 DYSLIPIDEMIA: ICD-10-CM

## 2020-05-27 LAB
A/G RATIO: 1.9 (ref 1.1–2.2)
ALBUMIN SERPL-MCNC: 4.3 G/DL (ref 3.4–5)
ALP BLD-CCNC: 59 U/L (ref 40–129)
ALT SERPL-CCNC: 16 U/L (ref 10–40)
ANION GAP SERPL CALCULATED.3IONS-SCNC: 11 MMOL/L (ref 3–16)
AST SERPL-CCNC: 25 U/L (ref 15–37)
BILIRUB SERPL-MCNC: 0.3 MG/DL (ref 0–1)
BUN BLDV-MCNC: 19 MG/DL (ref 7–20)
CALCIUM SERPL-MCNC: 9.6 MG/DL (ref 8.3–10.6)
CHLORIDE BLD-SCNC: 101 MMOL/L (ref 99–110)
CHOLESTEROL, TOTAL: 144 MG/DL (ref 0–199)
CO2: 27 MMOL/L (ref 21–32)
CREAT SERPL-MCNC: 1.1 MG/DL (ref 0.8–1.3)
GFR AFRICAN AMERICAN: >60
GFR NON-AFRICAN AMERICAN: >60
GLOBULIN: 2.3 G/DL
GLUCOSE BLD-MCNC: 85 MG/DL (ref 70–99)
HDLC SERPL-MCNC: 41 MG/DL (ref 40–60)
LDL CHOLESTEROL CALCULATED: 72 MG/DL
POTASSIUM SERPL-SCNC: 5.1 MMOL/L (ref 3.5–5.1)
SODIUM BLD-SCNC: 139 MMOL/L (ref 136–145)
TOTAL PROTEIN: 6.6 G/DL (ref 6.4–8.2)
TRIGL SERPL-MCNC: 153 MG/DL (ref 0–150)
TSH SERPL DL<=0.05 MIU/L-ACNC: 2.46 UIU/ML (ref 0.27–4.2)
VLDLC SERPL CALC-MCNC: 31 MG/DL

## 2020-05-28 ENCOUNTER — TELEPHONE (OUTPATIENT)
Dept: INTERNAL MEDICINE CLINIC | Age: 70
End: 2020-05-28

## 2020-05-28 LAB
BASOPHILS ABSOLUTE: 0 K/UL (ref 0–0.2)
BASOPHILS RELATIVE PERCENT: 0.4 %
EOSINOPHILS ABSOLUTE: 0.2 K/UL (ref 0–0.6)
EOSINOPHILS RELATIVE PERCENT: 3.5 %
ESTIMATED AVERAGE GLUCOSE: 108.3 MG/DL
HBA1C MFR BLD: 5.4 %
HCT VFR BLD CALC: 32.1 % (ref 40.5–52.5)
HEMATOLOGY PATH CONSULT: NO
HEMOGLOBIN: 14 G/DL (ref 13.5–17.5)
LYMPHOCYTES ABSOLUTE: 1.5 K/UL (ref 1–5.1)
LYMPHOCYTES RELATIVE PERCENT: 29.7 %
MCH RBC QN AUTO: 33.6 PG (ref 26–34)
MCHC RBC AUTO-ENTMCNC: 33.5 G/DL (ref 31–36)
MCV RBC AUTO: 100.3 FL (ref 80–100)
MONOCYTES ABSOLUTE: 0.5 K/UL (ref 0–1.3)
MONOCYTES RELATIVE PERCENT: 9.5 %
NEUTROPHILS ABSOLUTE: 2.8 K/UL (ref 1.7–7.7)
NEUTROPHILS RELATIVE PERCENT: 56.9 %
PDW BLD-RTO: 13.5 % (ref 12.4–15.4)
PLATELET # BLD: 300 K/UL (ref 135–450)
PLATELET SLIDE REVIEW: ADEQUATE
PMV BLD AUTO: 8.3 FL (ref 5–10.5)
RBC # BLD: 4.18 M/UL (ref 4.2–5.9)
SLIDE REVIEW: ABNORMAL
WBC # BLD: 8.1 K/UL (ref 4–11)

## 2020-05-28 NOTE — TELEPHONE ENCOUNTER
Marilyn Chowdhury is calling to notify of corrected result. CBC results in the chart have been corrected.

## 2020-05-29 LAB — TESTOSTERONE TOTAL: 543 NG/DL (ref 220–1000)

## 2020-06-19 ENCOUNTER — PATIENT MESSAGE (OUTPATIENT)
Dept: INTERNAL MEDICINE CLINIC | Age: 70
End: 2020-06-19

## 2020-06-22 RX ORDER — SILDENAFIL CITRATE 20 MG/1
TABLET ORAL
Qty: 30 TABLET | Refills: 2 | Status: SHIPPED | OUTPATIENT
Start: 2020-06-22 | End: 2021-02-17 | Stop reason: ALTCHOICE

## 2020-06-22 RX ORDER — METOPROLOL SUCCINATE 25 MG/1
TABLET, EXTENDED RELEASE ORAL
Qty: 12 TABLET | Refills: 3 | Status: SHIPPED | OUTPATIENT
Start: 2020-06-22 | End: 2020-07-28 | Stop reason: SDUPTHER

## 2020-07-08 ENCOUNTER — TELEPHONE (OUTPATIENT)
Dept: ORTHOPEDIC SURGERY | Age: 70
End: 2020-07-08

## 2020-07-08 NOTE — TELEPHONE ENCOUNTER
L/m on patient's v/m regarding status of back pain following RFA in June 2019. He was asked to call back to schedule appointment if needed.

## 2020-07-27 ENCOUNTER — TELEPHONE (OUTPATIENT)
Dept: INTERNAL MEDICINE CLINIC | Age: 70
End: 2020-07-27

## 2020-07-27 NOTE — TELEPHONE ENCOUNTER
Patient is requesting an appointment to discuss his symptoms after being taken off off clomiPRAMINE (ANAFRANIL) 25 MG capsule and the metoprolol succinate (TOPROL XL) 25 MG extended release tablet.   Please advise

## 2020-07-28 ENCOUNTER — OFFICE VISIT (OUTPATIENT)
Dept: INTERNAL MEDICINE CLINIC | Age: 70
End: 2020-07-28
Payer: MEDICARE

## 2020-07-28 ENCOUNTER — TELEPHONE (OUTPATIENT)
Dept: INTERNAL MEDICINE CLINIC | Age: 70
End: 2020-07-28

## 2020-07-28 VITALS — WEIGHT: 187 LBS | BODY MASS INDEX: 27.6 KG/M2 | HEART RATE: 89 BPM | TEMPERATURE: 97.7 F | RESPIRATION RATE: 12 BRPM

## 2020-07-28 PROCEDURE — 3017F COLORECTAL CA SCREEN DOC REV: CPT | Performed by: INTERNAL MEDICINE

## 2020-07-28 PROCEDURE — G8417 CALC BMI ABV UP PARAM F/U: HCPCS | Performed by: INTERNAL MEDICINE

## 2020-07-28 PROCEDURE — 99214 OFFICE O/P EST MOD 30 MIN: CPT | Performed by: INTERNAL MEDICINE

## 2020-07-28 PROCEDURE — 93000 ELECTROCARDIOGRAM COMPLETE: CPT | Performed by: INTERNAL MEDICINE

## 2020-07-28 PROCEDURE — 1123F ACP DISCUSS/DSCN MKR DOCD: CPT | Performed by: INTERNAL MEDICINE

## 2020-07-28 PROCEDURE — G8427 DOCREV CUR MEDS BY ELIG CLIN: HCPCS | Performed by: INTERNAL MEDICINE

## 2020-07-28 PROCEDURE — 4040F PNEUMOC VAC/ADMIN/RCVD: CPT | Performed by: INTERNAL MEDICINE

## 2020-07-28 PROCEDURE — 1036F TOBACCO NON-USER: CPT | Performed by: INTERNAL MEDICINE

## 2020-07-28 RX ORDER — METOPROLOL SUCCINATE 100 MG/1
TABLET, EXTENDED RELEASE ORAL
Qty: 90 TABLET | Refills: 3 | Status: SHIPPED | OUTPATIENT
Start: 2020-07-28 | End: 2020-09-24 | Stop reason: DRUGHIGH

## 2020-07-28 RX ORDER — CLOMIPRAMINE HYDROCHLORIDE 25 MG/1
25 CAPSULE ORAL NIGHTLY
Qty: 30 CAPSULE | Refills: 1 | Status: SHIPPED | OUTPATIENT
Start: 2020-07-28 | End: 2020-09-24 | Stop reason: CLARIF

## 2020-07-28 NOTE — PROGRESS NOTES
[facility]) Physicians  Internal Medicine  Patient Encounter  Ramiro Rojas D.O., [facility]        Chief Complaint   Patient presents with    Medication Problem       HPI: 69 y.o. male seen urgently today with concern about worsening anxiety. Patient has a remote history of alcoholism, depression, anxiety/OCD. Patient had been doing well for 27 years and requested to wean off of the Anafranil. He had not had any symptoms of depression, anxiety or OCD. He had been doing very well and had gotten down to the 25 mg dose and eventually stopped the medication without incident. He was also on a beta-blocker which was started many years ago for anxiety. Patient had been having some orthostatic lightheadedness. Patient had sent an email indicating that he was doing well off of both medications. His blood pressure had remained in the 120s-130s. He also stated that ED symptoms had improved. A few days after his email on 7/14/2020 he started to have intrusive thoughts that reminded him of years past.  During the time that he was abusing alcohol and having problems with anxiety 27 years ago he had obsessive thoughts about his heart. These thoughts began to return. The patient states that he obsessively thinks about his heart. He states he \"feels his heart. \". He describes some racing but denies any chest pain, shortness of breath, lightheadedness. Patient states he has a feeling that he is \"not in control. \". Patient has become easily distracted and has to read things over and over in order to comprehend what he is reading. He describes feelings of apprehension, fearfulness, nervousness and describes a fluttering sensation in his chest.  He denies any depression, suicidal ideation, homicidal ideation. Because of these anxiety symptoms returning he took 150 mg of leftover clomipramine. This really did not help much. He took this this past Thursday evening.   On Friday morning he was going to go to a golf outing and decided to take another 150 mg because he did not want to have any anxiety. Later that morning (Friday) he had a full cup of coffee and shortly thereafter became dizzy, clammy, pale. The symptoms resolved in short order. He was not evaluated at the hospital.  He got home and checked his blood pressure which was okay. Patient also reports that he had restarted the Toprol-XL at 50 mg a day about 3 to 4 days after his last email when the symptoms began as well. Years ago he had been placed on Toprol-XL due to tachycardia. Patient remains in [employer] and goes to meetings regularly. Today he states he still feels anxious and still has obsessive thoughts but overall he is a little better. Patient admits that he has a mindset that his body needs something to calm him down and he will \"seek out what he needs. \". He and his wife want to go back to his original regimen. Past Medical History:   Diagnosis Date    Acquired spondylolisthesis of lumbosacral region 2/8/2019    Alcoholism ([address]     Anxiety     Basal cell cancer 8/2010    Dr. Oden Sep Chronic bilateral low back pain without sciatica 2/8/2019    DDD (degenerative disc disease), lumbar 2/8/2019    Depression     DVT, lower extremity ([address] 5/29/2015    Right, gastroc, soleal    Hyperlipidemia     Lumbar facet arthropathy 2/15/2019    Lumbar foraminal stenosis 2/15/2019    Osteopenia     Squamous cell skin cancer 2017    right ear lobe    Stasis dermatitis     Varicose veins of both lower extremities     Venous insufficiency of both lower extremities          MEDICATIONS:  Medication Sig   sildenafil (REVATIO) 20 MG tablet Take TWO tabs as needed for ED.    clomiPRAMINE (ANAFRANIL) 25 MG capsule Take 1 capsule by mouth nightly   Testosterone (ANDROGEL) 20.25 MG/ACT (1.62%) GEL gel APPLY TOPICALLY 2 PUMPS  DAILY   fenofibrate (TRIGLIDE) 160 MG tablet TAKE 1 TABLET BY MOUTH  DAILY   metoprolol succinate (TOPROL XL) 100 MG extended release tablet TAKE 1 TABLET BY MOUTH DAILY   aspirin 81 MG tablet Take 81 mg by mouth daily   Cholecalciferol (VITAMIN D3) 2000 UNITS CAPS Take 1 capsule by mouth daily   metoprolol succinate (TOPROL XL) 25 MG extended release tablet Take 1 tab daily for 1 week then one half tab daily for 1 week then stop  Patient not taking: Reported on 7/28/2020   clomiPRAMINE (ANAFRANIL) 50 MG capsule Take 1 capsule by mouth nightly  Patient not taking: Reported on 7/28/2020           Review of Systems - As per HPI  GEN: Pt denies fever, chills or night sweats. Denies weight changes. Appetite good  HEENT: Pt denies visual and auditory changes, epistaxis, upper respiratory symptoms and dysphagia  CV: Pt denies CP, SOB, PRYOR, orthopnea palpitations, LE swelling, and claudication. PULM: Pt denies cough, wheezing or sputum production  GI: Pt denies N/V/D, heart burn, rectal bleeding, or melena. NEURO: Pt denies unilateral weakness, paresthesia and dizziness. OBJECTIVE:  Pulse 89   Temp 97.7 °F (36.5 °C)   Resp 12   Wt 187 lb (84.8 kg)   BMI 27.60 kg/m²    GEN: NAD, A&O, Non-toxic  HEENT: NC/AT, DRE, EOMI, Oral cavity Clear,  TM's NL, Nasal cavity clear. NECK: Supple. No thyromegaly. No JVD  LYMPH: No C/SC nodes. CV: Regular rhythm with what sounds like rate variation. No ectopy. No murmurs. PULM: CTA, symmentric air exchange  EXT: No C/C/E  GI: Abdomen is soft, NT, BS+, No hepatomegaly. No masses. NEURO: No focal or lateralizing deficits. VASC:  No carotid bruits. Pulses symmetric  SKIN:  No rashes or lesions of concern  Psych: Appears slightly anxious with slightly hurried speech but otherwise seems to be in a good mood. Psychomotor activity normal.  Insight normal.  Judgment normal.  Well dressed. ASSESSMENT[de-identified]  Srinivasa العراقي was seen today for medication problem.     Diagnoses and all orders for this visit:    Racing heart beat  -     EKG 12 Lead  -     metoprolol succinate (TOPROL XL) 100 MG extended release tablet; Take 1/2 tablet (50 mg) daily. Major depressive disorder with single episode[facility]  -     clomiPRAMINE (ANAFRANIL) 25 MG capsule; Take 1 capsule by mouth nightly . Increase to 50 mg nightly for 3 to 4 days then increase to 75 mg nightly for 3 to 4 days then increase to 100 mg nightly for 3 to 4 days then increase to 125 mg for 3 to 4 days then increase to 150 mg nightly    Obsessive thinking  -     clomiPRAMINE (ANAFRANIL) 25 MG capsule; Take 1 capsule by mouth nightly . Increase to 50 mg nightly for 3 to 4 days then increase to 75 mg nightly for 3 to 4 days then increase to 100 mg nightly for 3 to 4 days then increase to 125 mg for 3 to 4 days then increase to 150 mg nightly    Anxiety  -     clomiPRAMINE (ANAFRANIL) 25 MG capsule; Take 1 capsule by mouth nightly . Increase to 50 mg nightly for 3 to 4 days then increase to 75 mg nightly for 3 to 4 days then increase to 100 mg nightly for 3 to 4 days then increase to 125 mg for 3 to 4 days then increase to 150 mg nightly    History of substance abuse (HCC)  -     clomiPRAMINE (ANAFRANIL) 25 MG capsule; Take 1 capsule by mouth nightly . Increase to 50 mg nightly for 3 to 4 days then increase to 75 mg nightly for 3 to 4 days then increase to 100 mg nightly for 3 to 4 days then increase to 125 mg for 3 to 4 days then increase to 150 mg nightly    Orthostatic hypotension  -     metoprolol succinate (TOPROL XL) 100 MG extended release tablet; Take 1/2 tablet (50 mg) daily. Additional Plan:  1. Recommend psychiatry evaluation. 2.  He will continue going to AA    Discussed medications with patient who voiced understanding of their use, indication and potential side effects. Pt also understands the above recommendations. All questions answered. This note was generated completely or in part utilizing Dragon dictation speech recognition software.   Occasionally, words are mistranscribed and despite editing, the text may contain inaccuracies due to incorrect word recognition.   If further clarification is needed please contact the office at [phone]       Electronically signed    Prem Wright D.O.

## 2020-07-28 NOTE — PATIENT INSTRUCTIONS
To do list:    #1 restarting Anafranil and titrating up to your previous dose    #2 continue the metoprolol ER at 50 mg daily for right now    #3 continue attending your AA meetings    #4 return in 7 to 10 days for follow-up. #5 you will likely need to see psychiatry.

## 2020-07-28 NOTE — TELEPHONE ENCOUNTER
Patient's note said to have him return in 7-10 days. He said Dr. Belle Madera said in-person. Where should I schedule him unless I schedule Monday 8/10 later in the afternoon. Please let [facility] know or is Dr. Belle Madera okay doing a VV for the follow up?

## 2020-07-30 ENCOUNTER — TELEPHONE (OUTPATIENT)
Dept: INTERNAL MEDICINE CLINIC | Age: 70
End: 2020-07-30

## 2020-07-30 NOTE — TELEPHONE ENCOUNTER
Patient is scheduled for 8/13 as Vv. Does it need to be in-person? If so, please let  know and we will change and let patient know. Thanks.

## 2020-08-13 ENCOUNTER — OFFICE VISIT (OUTPATIENT)
Dept: INTERNAL MEDICINE CLINIC | Age: 70
End: 2020-08-13
Payer: MEDICARE

## 2020-08-13 VITALS
DIASTOLIC BLOOD PRESSURE: 60 MMHG | HEART RATE: 80 BPM | WEIGHT: 185 LBS | TEMPERATURE: 96.8 F | BODY MASS INDEX: 27.31 KG/M2 | RESPIRATION RATE: 12 BRPM | SYSTOLIC BLOOD PRESSURE: 88 MMHG

## 2020-08-13 PROCEDURE — 1036F TOBACCO NON-USER: CPT | Performed by: INTERNAL MEDICINE

## 2020-08-13 PROCEDURE — 1123F ACP DISCUSS/DSCN MKR DOCD: CPT | Performed by: INTERNAL MEDICINE

## 2020-08-13 PROCEDURE — G8428 CUR MEDS NOT DOCUMENT: HCPCS | Performed by: INTERNAL MEDICINE

## 2020-08-13 PROCEDURE — 3017F COLORECTAL CA SCREEN DOC REV: CPT | Performed by: INTERNAL MEDICINE

## 2020-08-13 PROCEDURE — 99213 OFFICE O/P EST LOW 20 MIN: CPT | Performed by: INTERNAL MEDICINE

## 2020-08-13 PROCEDURE — 4040F PNEUMOC VAC/ADMIN/RCVD: CPT | Performed by: INTERNAL MEDICINE

## 2020-08-13 PROCEDURE — G8417 CALC BMI ABV UP PARAM F/U: HCPCS | Performed by: INTERNAL MEDICINE

## 2020-08-13 RX ORDER — METOPROLOL SUCCINATE 50 MG/1
25 TABLET, EXTENDED RELEASE ORAL DAILY
Qty: 90 TABLET | Refills: 3 | Status: SHIPPED | OUTPATIENT
Start: 2020-08-13 | End: 2021-10-27

## 2020-08-13 RX ORDER — METOPROLOL SUCCINATE 50 MG/1
50 TABLET, EXTENDED RELEASE ORAL DAILY
Qty: 90 TABLET | Refills: 3 | Status: SHIPPED | OUTPATIENT
Start: 2020-08-13 | End: 2020-08-13 | Stop reason: DRUGHIGH

## 2020-08-13 NOTE — PROGRESS NOTES
[facility]) Physicians  Internal Medicine  Patient Encounter  Monica Miranda D.O., [place]        Chief Complaint   Patient presents with    Follow-up       HPI: 69 y.o. male seen for a short interval follow-up and medication check regarding his diagnoses of major depressive disorder, obsessive thinking (OCD), anxiety and history of substance abuse. Please refer to progress note on 7/28/2020 for details. Briefly, the patient had been feeling so well that he requested to wean off of his clomipramine and beta-blocker. After a transient period of feeling quite well, he had started to experience intrusive thoughts that reminded him of previous years when he was abusing alcohol. He began to have obsessive thoughts about his heart health as he has had in the past.  Patient reported some episodes of heart racing but had no reports of chest pain, shortness of breath. He had feelings of just not being in control. He reported feelings of apprehension, fearfulness, nervousness. He redosed his Anafranil without much effect after 1 dosing. He then had some increasing anxiety just prior to a golf outing he was to attend. At his last visit we decided that going back on his previous regimen would be the best course of action. He was to titrate his clomipramine back up to his 150 mg nightly dose. He was to stay on Toprol-XL 50 mg daily. Patient indicated he was very involved in [place] [address] and would continue going to his meetings. He states he is feeling much better. He may want to decrease the Beta Blocker. He is still having dizziness. He describes this as lightheadedness. He reports orthostatic dizziness. This is not happening on the 50 mg Toprol XL. Patient states that on the 50 mg he has been doing okay without much of the lightheadedness. He denies any syncopal episodes.     Past Medical History:   Diagnosis Date    Acquired spondylolisthesis of lumbosacral region 2/8/2019    [facility]ism [facility] [address]     Anxiety  Basal cell cancer 8/2010    Dr. Gopi Otto Chronic bilateral low back pain without sciatica 2/8/2019    DDD (degenerative disc disease), lumbar 2/8/2019    Depression     DVT, lower extremity ([address] 5/29/2015    Right, gastroc, soleal    Hyperlipidemia     Lumbar facet arthropathy 2/15/2019    Lumbar foraminal stenosis 2/15/2019    Osteopenia     Squamous cell skin cancer 2017    right ear lobe    Stasis dermatitis     Varicose veins of both lower extremities     Venous insufficiency of both lower extremities          MEDICATIONS:  Medication Sig   clomiPRAMINE (ANAFRANIL) 75 MG capsule Take 125 mg nightly. Increase to 150 mg nightly   metoprolol succinate (TOPROL XL) 100 MG extended release tablet Take 1/2 tablet (50 mg) daily. sildenafil (REVATIO) 20 MG tablet Take TWO tabs as needed for ED. Testosterone (ANDROGEL) 20.25 MG/ACT (1.62%) GEL gel APPLY TOPICALLY 2 PUMPS  DAILY   fenofibrate (TRIGLIDE) 160 MG tablet TAKE 1 TABLET BY MOUTH  DAILY   aspirin 81 MG tablet Take 81 mg by mouth daily   Cholecalciferol (VITAMIN D3) 2000 UNITS CAPS Take 1 capsule by mouth daily           Review of Systems - As per HPI      OBJECTIVE:  Vitals:    08/13/20 0946 08/13/20 1023 08/13/20 1024 08/13/20 1025   BP: 115/60 100/60 90/60 88/60   Position:  Supine Sitting Standing   Pulse: 68 80 80 80   Resp: 12      Temp: 96.8 °F (36 °C)      Weight: 185 lb (83.9 kg)        Body mass index is 27.31 kg/m². Wt Readings from Last 3 Encounters:   08/13/20 185 lb (83.9 kg)   07/28/20 187 lb (84.8 kg)   02/24/20 181 lb (82.1 kg)     BP Readings from Last 3 Encounters:   08/13/20 88/60   05/26/20 82/64   02/24/20 (!) 82/58       GEN: NAD, A&O, Non-toxic  HEENT: NC/AT, DRE, EOMI, Oral cavity Clear,  TM's NL, Nasal cavity clear. NECK: Supple. No thyromegaly. No JVD  LYMPH: No C/SC nodes. CV: S1 S2 NL, RRR. No murmurs, clicks or rubs.   PULM: CTA, symmentric air exchange  EXT: No edema.  + Varicose veins.    ASSESSMENT[de-identified]  Savannah Brito was seen today for follow-up. Diagnoses and all orders for this visit:    Orthostatic hypotension  Etiology of his orthostatic hypotension is unclear. It looks as if he may have autonomic dysfunction. Decrease metoprolol ER to 50 mg one half daily (25 mg)  The Anafranil may be contributing to orthostatic hypotension. Push fluids including electrolyte drinks  Recommend he wear compression stockings for vascular support  May need tilt table testing  May need cardiology consultation    Major depressive disorder with single episode, in full remission ([facility] [address]  Obsessive thinking  Anxiety  Patient will continue his Anafranil. He is doing much better. Continue monitoring  Consider psychiatry consultation     racing heart beat  -     metoprolol succinate (TOPROL XL) 50 MG extended release tablet; Take 0.5 tablets by mouth daily                Discussed medications with patient who voiced understanding of their use, indication and potential side effects. Pt also understands the above recommendations. All questions answered. This note was generated completely or in part utilizing Dragon dictation speech recognition software. Occasionally, words are mistranscribed and despite editing, the text may contain inaccuracies due to incorrect word recognition.   If further clarification is needed please contact the office at [phone]       Electronically signed    Emil Henao D.O.

## 2020-08-13 NOTE — PATIENT INSTRUCTIONS
To do list:      #1 continue dosing your Anafranil at 150 mg nightly. #2 continue to monitor your anxiety and obsessive thinking. #3 monitor your blood pressure AND pulse lying down, sitting, and standing and record in a diary--report these readings weekly and follow-up in 1 month    #4 decrease the metoprolol ER to 25 mg daily (Toprol-XL)    #5 stay well-hydrated with water and electrolyte drinks. #6 wear your compression stockings which can provide vascular support. Patient Education        Orthostatic Hypotension: Care Instructions  Your Care Instructions     Orthostatic hypotension is a quick drop in blood pressure. It happens when you get up from sitting or lying down. You may feel faint, lightheaded, or dizzy. When a person sits up or stands up, the body changes the way it pumps blood. This can slow the flow of blood to the brain for a very short time. And that can make you feel lightheaded. Many medicines can cause this problem, especially in older people. Lack of fluids (dehydration) or illnesses such as diabetes or heart disease also can cause it. Follow-up care is a key part of your treatment and safety. Be sure to make and go to all appointments, and call your doctor if you are having problems. It's also a good idea to know your test results and keep a list of the medicines you take. How can you care for yourself at home? · Be safe with medicines. Call your doctor if you think you are having a problem with your medicine. You will get more details on the specific medicines your doctor prescribes. · If you feel dizzy or lightheaded, sit down or lie down for a few minutes. Or you can sit down and put your head between your knees. This will help your blood pressure go back to normal and help your symptoms go away. · Follow your doctor's suggestions for ways to prevent symptoms like dizziness. These suggestions may include:  ? Get up slowly from bed or after sitting for a long time.  If you

## 2020-09-24 ENCOUNTER — OFFICE VISIT (OUTPATIENT)
Dept: INTERNAL MEDICINE CLINIC | Age: 70
End: 2020-09-24
Payer: MEDICARE

## 2020-09-24 VITALS
WEIGHT: 181 LBS | TEMPERATURE: 97.8 F | DIASTOLIC BLOOD PRESSURE: 60 MMHG | SYSTOLIC BLOOD PRESSURE: 92 MMHG | BODY MASS INDEX: 26.72 KG/M2 | HEART RATE: 80 BPM

## 2020-09-24 PROCEDURE — 99214 OFFICE O/P EST MOD 30 MIN: CPT | Performed by: INTERNAL MEDICINE

## 2020-09-24 PROCEDURE — 90694 VACC AIIV4 NO PRSRV 0.5ML IM: CPT | Performed by: INTERNAL MEDICINE

## 2020-09-24 PROCEDURE — 1123F ACP DISCUSS/DSCN MKR DOCD: CPT | Performed by: INTERNAL MEDICINE

## 2020-09-24 PROCEDURE — G8427 DOCREV CUR MEDS BY ELIG CLIN: HCPCS | Performed by: INTERNAL MEDICINE

## 2020-09-24 PROCEDURE — 3017F COLORECTAL CA SCREEN DOC REV: CPT | Performed by: INTERNAL MEDICINE

## 2020-09-24 PROCEDURE — 4040F PNEUMOC VAC/ADMIN/RCVD: CPT | Performed by: INTERNAL MEDICINE

## 2020-09-24 PROCEDURE — G0008 ADMIN INFLUENZA VIRUS VAC: HCPCS | Performed by: INTERNAL MEDICINE

## 2020-09-24 PROCEDURE — 1036F TOBACCO NON-USER: CPT | Performed by: INTERNAL MEDICINE

## 2020-09-24 PROCEDURE — G8417 CALC BMI ABV UP PARAM F/U: HCPCS | Performed by: INTERNAL MEDICINE

## 2020-09-24 RX ORDER — CLOMIPRAMINE HYDROCHLORIDE 75 MG/1
150 CAPSULE ORAL NIGHTLY
COMMUNITY
End: 2020-09-24 | Stop reason: SDUPTHER

## 2020-09-24 RX ORDER — CLOMIPRAMINE HYDROCHLORIDE 75 MG/1
150 CAPSULE ORAL NIGHTLY
Qty: 180 CAPSULE | Refills: 3 | Status: SHIPPED | OUTPATIENT
Start: 2020-09-24 | End: 2021-09-13

## 2020-09-24 NOTE — PATIENT INSTRUCTIONS
Patient Education        Orthostatic Hypotension: Care Instructions  Your Care Instructions     Orthostatic hypotension is a quick drop in blood pressure. It happens when you get up from sitting or lying down. You may feel faint, lightheaded, or dizzy. When a person sits up or stands up, the body changes the way it pumps blood. This can slow the flow of blood to the brain for a very short time. And that can make you feel lightheaded. Many medicines can cause this problem, especially in older people. Lack of fluids (dehydration) or illnesses such as diabetes or heart disease also can cause it. Follow-up care is a key part of your treatment and safety. Be sure to make and go to all appointments, and call your doctor if you are having problems. It's also a good idea to know your test results and keep a list of the medicines you take. How can you care for yourself at home? · Be safe with medicines. Call your doctor if you think you are having a problem with your medicine. You will get more details on the specific medicines your doctor prescribes. · If you feel dizzy or lightheaded, sit down or lie down for a few minutes. Or you can sit down and put your head between your knees. This will help your blood pressure go back to normal and help your symptoms go away. · Follow your doctor's suggestions for ways to prevent symptoms like dizziness. These suggestions may include:  ? Get up slowly from bed or after sitting for a long time. If you are in bed, roll to your side and swing your legs over the edge of the bed and onto the floor. Push your body up to a sitting position. Wait for a while before you slowly stand up.  ? Add more salt to your diet, if your doctor recommends it. ? Drink plenty of fluids, enough so that your urine is light yellow or clear like water. Choose water and other caffeine-free clear liquids.  If you have kidney, heart, or liver disease and have to limit fluids, talk with your doctor before you increase the amount of fluids you drink. ? Avoid or limit alcohol to 2 drinks a day for men and 1 drink a day for women. Alcohol may interfere with your medicine. In addition, alcohol can make your low blood pressure worse by causing your body to lose water. ? Avoid or limit caffeine. Caffeine can cause your body to lose water. ? Wear compression stockings to help improve blood flow. When should you call for help? [id number] anytime you think you may need emergency care. For example, call if:  · You passed out (lost consciousness). Watch closely for changes in your health, and be sure to contact your doctor if:  · You do not get better as expected. Where can you learn more? Go to https://[employer]. org and sign in to your [employer] account. Enter I650 in the Real[employer] box to learn more about \"Orthostatic Hypotension: Care Instructions. \"     If you do not have an account, please click on the \"Sign Up Now\" link. Current as of: December 16, 2019               Content Version: 12.5  © 0672-4983 Healthwise, Incorporated. Care instructions adapted under license by [facility] ([facility]). If you have questions about a medical condition or this instruction, always ask your healthcare professional. [address] any warranty or liability for your use of this information.

## 2020-09-24 NOTE — PROGRESS NOTES
Vaccine Information Sheet, \"Influenza - Inactivated\"  given to Kirby Patel, or parent/legal guardian of  Kirby Patel and verbalized understanding. Patient responses:    Have you ever had a reaction to a flu vaccine? No  Do you have any current illness? No  Have you ever had Guillian Wetmore Syndrome? No  Do you have a serious allergy to any of the follow: Neomycin, Polymyxin, Thimerosal, eggs or egg products? No    Flu vaccine given per order. Please see immunization tab. Risks and benefits explained. Current VIS given.       Immunizations Administered     Name Date Dose Route    Influenza, Quadv, adjuvanted, 65 yrs +, IM, PF (Fluad) 9/24/2020 0.5 mL Intramuscular    Site: Deltoid- Left    Lot: [id number]    NDC: 33673-246-75

## 2020-09-24 NOTE — PROGRESS NOTES
[facility] Physicians  Internal Medicine  Patient Encounter  Ana Maria Holt D.O., [facility]        Chief Complaint   Patient presents with    1 Month Follow-Up       HPI: 69 y.o. male today for short interval follow-up regarding his blood pressure, anxiety, OCD, history of substance use disorder. He is also requesting a check up regarding the status of his current chronic medical issues. Please refer to previous progress notes for details leading up to today's follow-up. Briefly, the patient is now back on his Anafranil, 150 mg nightly. With regards to his anxiety and obsessive thinking he feels he is tremendously better. He does not wish to make any changes in his medication. Patient also continues to have evidence of orthostatic hypotension but he is asymptomatic. His blood pressure does drop a little. On 9/21/2020 is lying blood pressure was 138/78, sitting 116/74, standing 105/70 with only minimal change in pulse. He remains on a low-dose of Toprol-XL 25 mg daily. He had been started on a beta-blocker many years ago really for anxiety. He denies any lightheadedness or syncopal episodes. Here in the office his supine blood pressure was 124/60, sitting 120/66, standing 92/66. Pulse increased from supine to standing-80----> 95. He has been drinking Gatorade Zero and wearing compression stockings. He denies lightheadedness, syncope. He denies any chest pain or palpitations. Previous echocardiogram in 2011 showed mild aortic regurgitation. Patient still may have some lightheadedness if he stands too quickly. Hypoaldosteronism-- he is due for refill next month. He feels the Testosterone works well. Dyslipidemia--Patient remains on fenofibrate without adverse side effects.   Lab Results   Component Value Date    LDLCALC 72 05/27/2020        Past Medical History:   Diagnosis Date    Acquired spondylolisthesis of lumbosacral region 2/8/2019    Alcoholism ([address]     Anxiety     Basal cell cancer 8/2010    Dr. Marlo Lagos Chronic bilateral low back pain without sciatica 2/8/2019    DDD (degenerative disc disease), lumbar 2/8/2019    Depression     DVT, lower extremity ([address] 5/29/2015    Right, gastroc, soleal    Hyperlipidemia     Lumbar facet arthropathy 2/15/2019    Lumbar foraminal stenosis 2/15/2019    Osteopenia     Squamous cell skin cancer 2017    right ear lobe    Stasis dermatitis     Varicose veins of both lower extremities     Venous insufficiency of both lower extremities          MEDICATIONS:  Prior to Visit Medications    Medication Sig Taking? Authorizing Provider   clomiPRAMINE (ANAFRANIL) 75 MG capsule Take 2 capsules by mouth nightly Yes Keith Valenzuela DO   metoprolol succinate (TOPROL XL) 50 MG extended release tablet Take 0.5 tablets by mouth daily Yes Keith Valenzuela DO   sildenafil (REVATIO) 20 MG tablet Take TWO tabs as needed for ED. Yes Keith Valenzuela DO   Testosterone (ANDROGEL) 20.25 MG/ACT (1.62%) GEL gel APPLY TOPICALLY 2 PUMPS  DAILY Yes Keith Valenzuela DO   fenofibrate (TRIGLIDE) 160 MG tablet TAKE 1 TABLET BY MOUTH  DAILY Yes Keith Valenzuela DO   aspirin 81 MG tablet Take 81 mg by mouth daily Yes Historical Provider, MD   Cholecalciferol (VITAMIN D3) 2000 UNITS CAPS Take 1 capsule by mouth daily Yes Historical Provider, MD           Review of Systems - As per HPI      OBJECTIVE:  Vitals:    09/24/20 1111 09/24/20 1203 09/24/20 1204 09/24/20 1205   BP: 120/66 132/70 110/60 92/60   Site:  Left Upper Arm Left Upper Arm Left Upper Arm   Position: Sitting Supine Sitting Standing   Pulse: 81 80 80 80   Temp:       Weight: 181 lb (82.1 kg)        Body mass index is 26.72 kg/m².      Wt Readings from Last 3 Encounters:   09/24/20 181 lb (82.1 kg)   08/13/20 185 lb (83.9 kg)   07/28/20 187 lb (84.8 kg)     BP Readings from Last 3 Encounters:   09/24/20 92/60   08/13/20 88/60   05/26/20 82/64        GEN: NAD, A&O, Non-toxic  HEENT: NC/AT, DRE, EOMI, Oral cavity controlled  --PSA in February  --Will refill testosterone when needed    Impaired fasting glucose  --Condition is well controlled and asymptomatic  --A1c at the time of his physical    Aortic valve insufficiency, etiology of cardiac valve disease unspecified  --Identified in 2011 on echo. -     ECHO Complete 2D W Doppler Eliazar Padilla MD, Cardiology, [facility]    Venous insufficiency of both lower extremities  --Condition is well controlled  --Continue compression stockings          Discussed medications with patient who voiced understanding of their use, indication and potential side effects. Pt also understands the above recommendations. All questions answered. This note was generated completely or in part utilizing Dragon dictation speech recognition software. Occasionally, words are mistranscribed and despite editing, the text may contain inaccuracies due to incorrect word recognition.   If further clarification is needed please contact the office at [phone]       Electronically signed    Pearl Luna D.O.

## 2020-10-08 NOTE — PROGRESS NOTES
[facility]     Outpatient Cardiology         Chief Complaint   Patient presents with    Hypotension     orthostatic, referred by Dr. Wright Slice       HPI     Eddi Fontenot a 69 y.o. male here for orthostatic hypotension. Referred by PCP. PMH of HLD,HTN, and venous insufficiency of LE with thrombosis not longer on anticoagulation. Hypertension, controlled on metoprolol. Orthostatic hypotension, referred by his PCP, no syncopal events, clearly dropping blood pressure associated with lightheadedness. Happens with change in position. No other associated symptoms, denies chest pain, nonanginal.  Had an echocardiogram within normal limits. Interestingly, his blood pressure will go without changes in his heart rate, having said that he is on metoprolol.     PMH  Past Medical History:   Diagnosis Date    Acquired spondylolisthesis of lumbosacral region 2/8/2019    Alcoholism ([facility] [address]     Anxiety     Basal cell cancer 8/2010    Dr. Isaac Estevez Chronic bilateral low back pain without sciatica 2/8/2019    DDD (degenerative disc disease), lumbar 2/8/2019    Depression     DVT, lower extremity ([address] 5/29/2015    Right, gastroc, soleal    Hyperlipidemia     Hypertension     Lumbar facet arthropathy 2/15/2019    Lumbar foraminal stenosis 2/15/2019    Osteopenia     Squamous cell skin cancer 2017    right ear lobe    Stasis dermatitis     Varicose veins of both lower extremities     Venous insufficiency of both lower extremities        PSH  Past Surgical History:   Procedure Laterality Date    ANESTHESIA NERVE BLOCK Bilateral 3/25/2019    BILATERAL L3,L4,L5 DR MEDIAL BRANCH BLOCKS WITH FLUOROSCOPY performed by Melissa Jimenez MD at [address]      from [address] [place] [address] Bilateral 3/4/2019    BILATERAL L3, L4, L5 DORSAL RAMUS LUMBAR MEDIAL BRANCH BLOCK WITH FLUOROSCOPY performed by Melissa Jimenez MD at [address] [place] [address] Bilateral 6/3/2019    BILATERAL L3,L4,L5 DORSAL RAMUS RADIOFRQUENCY ABLATION WITH FLUOROSCOPY performed by Talib Rolon MD at [address]  02/2019    Uvula lesion, papilloma, Dr. Randall Cornell  8/2010    SKIN CANCER EXCISION Right 2017    [facility]. Social HIstory  Social History     Tobacco Use    Smoking status: Never Smoker    Smokeless tobacco: Never Used   Substance Use Topics    Alcohol use: No     Comment: Quit 11/3/1992    Drug use: No     Comment: 1992 Narcotic, Benzo, alcohol addition       Family History  Family History   Problem Relation Age of Onset    High Blood Pressure Mother     Heart Disease Mother         a fib    Heart Disease Father     Diabetes Father     Heart Attack Father        Allergies   No Known Allergies    Medications:     Home Medications:  Were reviewed and are listed in nursing record. and/or listed below    Prior to Admission medications    Medication Sig Start Date End Date Taking? Authorizing Provider   clomiPRAMINE (ANAFRANIL) 75 MG capsule Take 2 capsules by mouth nightly 9/24/20  Yes Keith Valenzuela DO   metoprolol succinate (TOPROL XL) 50 MG extended release tablet Take 0.5 tablets by mouth daily 8/13/20  Yes Keith Valenzuela DO   fenofibrate (TRIGLIDE) 160 MG tablet TAKE 1 TABLET BY MOUTH  DAILY 4/6/20  Yes Keith Valenzuela DO   aspirin 81 MG tablet Take 81 mg by mouth daily   Yes Historical Provider, MD   Cholecalciferol (VITAMIN D3) 2000 UNITS CAPS Take 1 capsule by mouth daily   Yes Historical Provider, MD   sildenafil (REVATIO) 20 MG tablet Take TWO tabs as needed for ED. 6/22/20   [facility] PARK Valenzuela DO   Testosterone (ANDROGEL) 20.25 MG/ACT (1.62%) GEL gel APPLY TOPICALLY 2 PUMPS  DAILY 4/6/20 10/3/20  Keith Valenzuela DO        Review of Systems   Constitutional: Negative for activity change, appetite change, diaphoresis, fatigue, fever and unexpected weight change.    HENT: Negative for congestion, facial swelling, mouth sores and nosebleeds. Eyes: Negative for discharge and visual disturbance. Respiratory: Negative for cough, chest tightness, shortness of breath and wheezing. Cardiovascular: Negative for chest pain, palpitations and leg swelling. Gastrointestinal: Negative for abdominal distention, abdominal pain, blood in stool and vomiting. Endocrine: Negative for cold intolerance, heat intolerance and polyuria. Genitourinary: Negative for difficulty urinating, dysuria, frequency and hematuria. Musculoskeletal: Negative for back pain, joint swelling, myalgias and neck pain. Skin: Negative for color change, pallor and rash. Allergic/Immunologic: Negative for immunocompromised state. Neurological: Positive for light-headedness. Negative for dizziness, syncope, weakness, numbness and headaches. Hematological: Negative for adenopathy. Does not bruise/bleed easily. Psychiatric/Behavioral: Negative for behavioral problems, confusion, decreased concentration and suicidal ideas. The patient is not nervous/anxious. Vitals:    10/12/20 1459   BP: 100/80   Pulse: 84   Temp:     Weight: 177 lb 9.6 oz (80.6 kg)       Vitals:    10/12/20 1446 10/12/20 1458 10/12/20 1459   BP: (!) 142/90 112/80 100/80   Site: Left Upper Arm Left Upper Arm Left Upper Arm   Position: Supine Sitting Standing   Cuff Size: Medium Adult Medium Adult Medium Adult   Pulse: 77 97 84   Temp: 97.6 °F (36.4 °C)     Weight: 177 lb 9.6 oz (80.6 kg)         BP Readings from Last 3 Encounters:   10/12/20 100/80   09/24/20 92/60   08/13/20 88/60       Wt Readings from Last 3 Encounters:   10/12/20 177 lb 9.6 oz (80.6 kg)   09/24/20 181 lb (82.1 kg)   08/13/20 185 lb (83.9 kg)       Physical Exam  Constitutional:       General: He is not in acute distress. Appearance: He is well-developed. He is not diaphoretic. HENT:      Head: Normocephalic and atraumatic. Eyes:      Pupils: Pupils are equal, round, and reactive to light.    Neck: Musculoskeletal: Normal range of motion. Thyroid: No thyromegaly. Vascular: No JVD. Cardiovascular:      Rate and Rhythm: Normal rate and regular rhythm. Chest Wall: PMI is not displaced. Heart sounds: Normal heart sounds, S1 normal and S2 normal. No murmur. No friction rub. No gallop. Pulmonary:      Effort: Pulmonary effort is normal. No respiratory distress. Breath sounds: Normal breath sounds. No stridor. No wheezing or rales. Chest:      Chest wall: No tenderness. Abdominal:      General: Bowel sounds are normal. There is no distension. Palpations: Abdomen is soft. Tenderness: There is no abdominal tenderness. There is no guarding or rebound. Musculoskeletal: Normal range of motion. General: No tenderness. Lymphadenopathy:      Cervical: No cervical adenopathy. Skin:     General: Skin is warm and dry. Findings: No erythema or rash. Neurological:      Mental Status: He is alert and oriented to person, place, and time. Coordination: Coordination normal.   Psychiatric:         Behavior: Behavior normal.         Thought Content:  Thought content normal.         Judgment: Judgment normal.         Labs:       Lab Results   Component Value Date    WBC 8.1 05/27/2020    HGB 14.0 05/27/2020    HCT 32.1 (L) 05/27/2020    .3 (H) 05/27/2020     05/27/2020     Lab Results   Component Value Date     05/27/2020    K 5.1 05/27/2020     05/27/2020    CO2 27 05/27/2020    BUN 19 05/27/2020    CREATININE 1.1 05/27/2020    GLUCOSE 85 05/27/2020    CALCIUM 9.6 05/27/2020    PROT 6.6 05/27/2020    LABALBU 4.3 05/27/2020    BILITOT 0.3 05/27/2020    ALKPHOS 59 05/27/2020    AST 25 05/27/2020    ALT 16 05/27/2020    LABGLOM >60 05/27/2020    GFRAA >60 05/27/2020    AGRATIO 1.9 05/27/2020    GLOB 2.3 05/27/2020         Lab Results   Component Value Date    CHOL 144 05/27/2020    CHOL 139 02/10/2020    CHOL 147 08/08/2019     Lab Results Component Value Date    TRIG 153 (H) 05/27/2020    TRIG 77 02/10/2020    TRIG 147 08/08/2019     Lab Results   Component Value Date    HDL 41 05/27/2020    HDL 52 02/10/2020    HDL 44 08/08/2019     Lab Results   Component Value Date    LDLCALC 72 05/27/2020    LDLCALC 72 02/10/2020    LDLCALC 74 08/08/2019     Lab Results   Component Value Date    LABVLDL 31 05/27/2020    LABVLDL 15 02/10/2020    LABVLDL 29 08/08/2019     No results found for: CHOLHDLRATIO    No results found for: INR, PROTIME    The 10-year ASCVD risk score (Alejandro Layton, et al., 2013) is: 12.1%    Values used to calculate the score:      Age: 69 years      Sex: Male      Is Non- : No      Diabetic: No      Tobacco smoker: No      Systolic Blood Pressure: 978 mmHg      Is BP treated: No      HDL Cholesterol: 41 mg/dL      Total Cholesterol: 144 mg/dL      Imaging:       Last ECG (if available):  NSR     Last Monitor/Holter    Last Stress (4/28/11):  Study Conclusions   - Left ventricle: The cavity size was normal. Wall thickness    was normal. Systolic function was normal. The estimated    ejection fraction was in the range of 50% to 55%. Wall    motion was normal; there were no regional wall motion    abnormalities. Doppler parameters are consistent with    abnormal left ventricular relaxation (grade 1 diastolic    dysfunction).  - Aortic valve: Mild regurgitation. Last Cath (if available):    Last TTE/CHOLO(if available):    Last CMR  (if available):      Assessment / Plan:     Orthostatic hypotension  Continue using compression stockings, thigh-high. We will check morning cortisol. If it persists, may consider low-dose midodrine. Reasonable to perform tilt table test, although will not change treatment. Benign essential HTN  Controlled on metoprolol. I had the opportunity to review the clinical symptoms and presentation of Constanza Rivera. Patient's allergies and medications were reviewed and updated. Patient's past medical, surgical, social and family history were reviewed and updated. Patient's testing including laboratory, ECGs, monitor, imaging (TTE,CHOLO,CMR,cath) were reviewed. Tobacco use was discussed with the patient and educated on the negative effects. I have asked the patient to not utilize these agents. All questions and concerns were addressed to the patient/family. Alternatives to my treatment were discussed. The note was completed using EMR. Every effort wasmade to ensure accuracy; however, inadvertent computerized transcription errors may be present. Thank you for allowing me to participate in the[facility] or [facility] SARA Sesay MD, [facility], [facility]

## 2020-10-09 ENCOUNTER — HOSPITAL ENCOUNTER (OUTPATIENT)
Dept: NON INVASIVE DIAGNOSTICS | Age: 70
Discharge: HOME | End: 2020-10-09
Payer: MEDICARE

## 2020-10-09 LAB
LV EF: 63 %
LVEF MODALITY: NORMAL

## 2020-10-09 PROCEDURE — 93306 TTE W/DOPPLER COMPLETE: CPT

## 2020-10-12 ENCOUNTER — OFFICE VISIT (OUTPATIENT)
Dept: CARDIOLOGY CLINIC | Age: 70
End: 2020-10-12
Payer: MEDICARE

## 2020-10-12 VITALS
DIASTOLIC BLOOD PRESSURE: 80 MMHG | TEMPERATURE: 97.6 F | SYSTOLIC BLOOD PRESSURE: 100 MMHG | WEIGHT: 177.6 LBS | BODY MASS INDEX: 26.21 KG/M2 | HEART RATE: 84 BPM

## 2020-10-12 PROBLEM — I95.1 ORTHOSTATIC HYPOTENSION: Status: ACTIVE | Noted: 2020-10-12

## 2020-10-12 PROCEDURE — 1123F ACP DISCUSS/DSCN MKR DOCD: CPT | Performed by: INTERNAL MEDICINE

## 2020-10-12 PROCEDURE — G8417 CALC BMI ABV UP PARAM F/U: HCPCS | Performed by: INTERNAL MEDICINE

## 2020-10-12 PROCEDURE — G8427 DOCREV CUR MEDS BY ELIG CLIN: HCPCS | Performed by: INTERNAL MEDICINE

## 2020-10-12 PROCEDURE — 93000 ELECTROCARDIOGRAM COMPLETE: CPT | Performed by: INTERNAL MEDICINE

## 2020-10-12 PROCEDURE — 99203 OFFICE O/P NEW LOW 30 MIN: CPT | Performed by: INTERNAL MEDICINE

## 2020-10-12 PROCEDURE — 1036F TOBACCO NON-USER: CPT | Performed by: INTERNAL MEDICINE

## 2020-10-12 PROCEDURE — G8484 FLU IMMUNIZE NO ADMIN: HCPCS | Performed by: INTERNAL MEDICINE

## 2020-10-12 PROCEDURE — 4040F PNEUMOC VAC/ADMIN/RCVD: CPT | Performed by: INTERNAL MEDICINE

## 2020-10-12 PROCEDURE — 3017F COLORECTAL CA SCREEN DOC REV: CPT | Performed by: INTERNAL MEDICINE

## 2020-10-12 ASSESSMENT — ENCOUNTER SYMPTOMS
COUGH: 0
BLOOD IN STOOL: 0
ABDOMINAL DISTENTION: 0
FACIAL SWELLING: 0
ABDOMINAL PAIN: 0
CHEST TIGHTNESS: 0
EYE DISCHARGE: 0
VOMITING: 0
SHORTNESS OF BREATH: 0
WHEEZING: 0
COLOR CHANGE: 0
BACK PAIN: 0

## 2020-10-12 NOTE — ASSESSMENT & PLAN NOTE
Continue using compression stockings, thigh-high. We will check morning cortisol. If it persists, may consider low-dose midodrine. Reasonable to perform tilt table test, although will not change treatment.

## 2020-10-16 DIAGNOSIS — I95.1 ORTHOSTATIC HYPOTENSION: ICD-10-CM

## 2020-10-16 LAB — CORTISOL - AM: 18.3 UG/DL (ref 4.3–22.4)

## 2020-11-16 ENCOUNTER — OFFICE VISIT (OUTPATIENT)
Dept: CARDIOLOGY CLINIC | Age: 70
End: 2020-11-16
Payer: MEDICARE

## 2020-11-16 VITALS
WEIGHT: 180.4 LBS | SYSTOLIC BLOOD PRESSURE: 130 MMHG | DIASTOLIC BLOOD PRESSURE: 72 MMHG | TEMPERATURE: 97.1 F | HEART RATE: 74 BPM | OXYGEN SATURATION: 96 % | HEIGHT: 69 IN | BODY MASS INDEX: 26.72 KG/M2

## 2020-11-16 PROCEDURE — 3017F COLORECTAL CA SCREEN DOC REV: CPT | Performed by: INTERNAL MEDICINE

## 2020-11-16 PROCEDURE — G8484 FLU IMMUNIZE NO ADMIN: HCPCS | Performed by: INTERNAL MEDICINE

## 2020-11-16 PROCEDURE — G8417 CALC BMI ABV UP PARAM F/U: HCPCS | Performed by: INTERNAL MEDICINE

## 2020-11-16 PROCEDURE — 99214 OFFICE O/P EST MOD 30 MIN: CPT | Performed by: INTERNAL MEDICINE

## 2020-11-16 PROCEDURE — 1123F ACP DISCUSS/DSCN MKR DOCD: CPT | Performed by: INTERNAL MEDICINE

## 2020-11-16 PROCEDURE — 1036F TOBACCO NON-USER: CPT | Performed by: INTERNAL MEDICINE

## 2020-11-16 PROCEDURE — G8427 DOCREV CUR MEDS BY ELIG CLIN: HCPCS | Performed by: INTERNAL MEDICINE

## 2020-11-16 PROCEDURE — 4040F PNEUMOC VAC/ADMIN/RCVD: CPT | Performed by: INTERNAL MEDICINE

## 2020-11-16 ASSESSMENT — ENCOUNTER SYMPTOMS
SHORTNESS OF BREATH: 0
VOMITING: 0
FACIAL SWELLING: 0
COLOR CHANGE: 0
CHEST TIGHTNESS: 0
WHEEZING: 0
BLOOD IN STOOL: 0
EYE DISCHARGE: 0
COUGH: 0
ABDOMINAL PAIN: 0
BACK PAIN: 0
ABDOMINAL DISTENTION: 0

## 2020-11-16 NOTE — PROGRESS NOTES
[facility]     Outpatient Cardiology         Chief Complaint   Patient presents with    Follow-up     s/p serum cortisol level nad echo       HPI     Glade Apgar a 69 y.o. male here for orthostatic hypotension. Referred by PCP. PMH of HLD,HTN, and venous insufficiency of LE with thrombosis not longer on anticoagulation. Hypertension, controlled on metoprolol. Orthostatic hypotension, doing very well from that perspective, having very few episodes of lightheadedness particularly when he is sitting down for a longer period of time otherwise no significant hypotension/Lightheadedness/syncope. His morning cortisol came back within normal limits.     PMH  Past Medical History:   Diagnosis Date    Acquired spondylolisthesis of lumbosacral region 2019    Alcoholism ([address]     Anxiety     Basal cell cancer 2010    Dr. Regenia Holstein Chronic bilateral low back pain without sciatica 2019    DDD (degenerative disc disease), lumbar 2019    Depression     DVT, lower extremity ([address] 2015    Right, gastroc, soleal    Hyperlipidemia     Hypertension     Lumbar facet arthropathy 2/15/2019    Lumbar foraminal stenosis 2/15/2019    Osteopenia     Squamous cell skin cancer 2017    right ear lobe    Stasis dermatitis     Varicose veins of both lower extremities     Venous insufficiency of both lower extremities        PSH  Past Surgical History:   Procedure Laterality Date    ANESTHESIA NERVE BLOCK Bilateral 3/25/2019    BILATERAL L3,L4,L5 DR MEDIAL BRANCH BLOCKS WITH FLUOROSCOPY performed by Cosme Severe, MD at [address]      from [address] [place] [address] Bilateral 3/4/2019    BILATERAL L3, L4, L5 DORSAL RAMUS LUMBAR MEDIAL BRANCH BLOCK WITH FLUOROSCOPY performed by Cosme Severe, MD at [address] [place] [address] Bilateral 6/3/2019    BILATERAL L3,L4,L5 DORSAL RAMUS RADIOFRQUENCY ABLATION WITH FLUOROSCOPY performed by Lisa Baltazar MD at [address]  02/2019    Uvula lesion, papilloma, Dr. Lyubov Souza  8/2010    SKIN CANCER EXCISION Right 2017    [facility]. Social HIstory  Social History     Tobacco Use    Smoking status: Never Smoker    Smokeless tobacco: Never Used   Substance Use Topics    Alcohol use: No     Comment: Quit 11/3/1992    Drug use: No     Comment: 1992 Narcotic, Benzo, alcohol addition       Family History  Family History   Problem Relation Age of Onset    High Blood Pressure Mother     Heart Disease Mother         a fib    Heart Disease Father     Diabetes Father     Heart Attack Father        Allergies   No Known Allergies    Medications:     Home Medications:  Were reviewed and are listed in nursing record. and/or listed below    Prior to Admission medications    Medication Sig Start Date End Date Taking? Authorizing Provider   clomiPRAMINE (ANAFRANIL) 75 MG capsule Take 2 capsules by mouth nightly 9/24/20  Yes Keith Valnezuela DO   metoprolol succinate (TOPROL XL) 50 MG extended release tablet Take 0.5 tablets by mouth daily 8/13/20  Yes Keith Valenzuela DO   fenofibrate (TRIGLIDE) 160 MG tablet TAKE 1 TABLET BY MOUTH  DAILY 4/6/20  Yes Keith Valenzuela DO   aspirin 81 MG tablet Take 81 mg by mouth daily   Yes Historical Provider, MD   Cholecalciferol (VITAMIN D3) 2000 UNITS CAPS Take 1 capsule by mouth daily   Yes Historical Provider, MD   sildenafil (REVATIO) 20 MG tablet Take TWO tabs as needed for ED. 6/22/20   Dion Valenzuela DO   Testosterone (ANDROGEL) 20.25 MG/ACT (1.62%) GEL gel APPLY TOPICALLY 2 PUMPS  DAILY 4/6/20 10/3/20  Keith Valenzuela DO        Review of Systems   Constitutional: Negative for activity change, appetite change, diaphoresis, fatigue, fever and unexpected weight change. HENT: Negative for congestion, facial swelling, mouth sores and nosebleeds. Eyes: Negative for discharge and visual disturbance.    Respiratory: Negative for cough, chest tightness, shortness of breath and wheezing. Cardiovascular: Negative for chest pain, palpitations and leg swelling. Gastrointestinal: Negative for abdominal distention, abdominal pain, blood in stool and vomiting. Endocrine: Negative for cold intolerance, heat intolerance and polyuria. Genitourinary: Negative for difficulty urinating, dysuria, frequency and hematuria. Musculoskeletal: Negative for back pain, joint swelling, myalgias and neck pain. Skin: Negative for color change, pallor and rash. Allergic/Immunologic: Negative for immunocompromised state. Neurological: Positive for light-headedness. Negative for dizziness, syncope, weakness, numbness and headaches. Hematological: Negative for adenopathy. Does not bruise/bleed easily. Psychiatric/Behavioral: Negative for behavioral problems, confusion, decreased concentration and suicidal ideas. The patient is not nervous/anxious. Vitals:    11/16/20 1310   BP: 130/72   Pulse: 74   Temp: 97.1 °F (36.2 °C)   SpO2: 96%    Weight: 180 lb 6.4 oz (81.8 kg)       Vitals:    11/16/20 1310   BP: 130/72   Site: Left Upper Arm   Position: Sitting   Cuff Size: Medium Adult   Pulse: 74   Temp: 97.1 °F (36.2 °C)   SpO2: 96%   Weight: 180 lb 6.4 oz (81.8 kg)   Height: 5' 9\" (1.753 m)       BP Readings from Last 3 Encounters:   11/16/20 130/72   10/12/20 100/80   09/24/20 92/60       Wt Readings from Last 3 Encounters:   11/16/20 180 lb 6.4 oz (81.8 kg)   10/12/20 177 lb 9.6 oz (80.6 kg)   09/24/20 181 lb (82.1 kg)       Physical Exam  Constitutional:       General: He is not in acute distress. Appearance: He is well-developed. He is not diaphoretic. HENT:      Head: Normocephalic and atraumatic. Eyes:      Pupils: Pupils are equal, round, and reactive to light. Neck:      Musculoskeletal: Normal range of motion. Thyroid: No thyromegaly. Vascular: No JVD.    Cardiovascular:      Rate and Rhythm: Normal rate and regular 52 02/10/2020    HDL 44 08/08/2019     Lab Results   Component Value Date    LDLCALC 72 05/27/2020    LDLCALC 72 02/10/2020    LDLCALC 74 08/08/2019     Lab Results   Component Value Date    LABVLDL 31 05/27/2020    LABVLDL 15 02/10/2020    LABVLDL 29 08/08/2019     No results found for: CHOLHDLRATIO    No results found for: INR, PROTIME    The 10-year ASCVD risk score (Sandy Yuan, et al., 2013) is: 18.5%    Values used to calculate the score:      Age: 69 years      Sex: Male      Is Non- : No      Diabetic: No      Tobacco smoker: No      Systolic Blood Pressure: 720 mmHg      Is BP treated: No      HDL Cholesterol: 41 mg/dL      Total Cholesterol: 144 mg/dL      Imaging:       Last ECG (if available):  NSR     Last Monitor/Holter    Last Stress (4/28/11):  Study Conclusions   - Left ventricle: The cavity size was normal. Wall thickness    was normal. Systolic function was normal. The estimated    ejection fraction was in the range of 50% to 55%. Wall    motion was normal; there were no regional wall motion    abnormalities. Doppler parameters are consistent with    abnormal left ventricular relaxation (grade 1 diastolic    dysfunction).  - Aortic valve: Mild regurgitation. Last Cath (if available):    Last TTE/CHOLO(if available):    Last CMR  (if available):      Assessment / Plan:     Benign essential HTN  Controlled on current medications. No side effects. No changes today. Orthostatic hypotension  Very mild. Normal cortisol. We will continue lifestyle modification including hydration, compression stockings. I do not think he needs midodrine at this point. We will follow-up in 6 months. Dyslipidemia  Taking fenofibrate. .      I had the opportunity to review the clinical symptoms and presentation of Mauro Chan. Patient's allergies and medications were reviewed and updated. Patient's past medical, surgical, social and family history were reviewed and updated. Patient's testing including laboratory, ECGs, monitor, imaging (TTE,CHOLO,CMR,cath) were reviewed. All questions and concerns were addressed to the patient/family. Alternatives to my treatment were discussed. The note was completed using EMR. Every effort wasmade to ensure accuracy; however, inadvertent computerized transcription errors may be present. Thank you for allowing me to [facility] the[facility] or [facility] R.  Analia Castro MD, [facility], [facility]

## 2021-02-11 ASSESSMENT — LIFESTYLE VARIABLES
HOW OFTEN DO YOU HAVE A DRINK CONTAINING ALCOHOL: 0
HOW OFTEN DO YOU HAVE A DRINK CONTAINING ALCOHOL: NEVER
AUDIT-C TOTAL SCORE: INCOMPLETE
AUDIT TOTAL SCORE: INCOMPLETE

## 2021-02-11 ASSESSMENT — PATIENT HEALTH QUESTIONNAIRE - PHQ9: SUM OF ALL RESPONSES TO PHQ QUESTIONS 1-9: 0

## 2021-02-17 ENCOUNTER — OFFICE VISIT (OUTPATIENT)
Dept: INTERNAL MEDICINE CLINIC | Age: 71
End: 2021-02-17
Payer: MEDICARE

## 2021-02-17 VITALS
DIASTOLIC BLOOD PRESSURE: 80 MMHG | SYSTOLIC BLOOD PRESSURE: 130 MMHG | TEMPERATURE: 97.7 F | HEIGHT: 69 IN | WEIGHT: 181.8 LBS | BODY MASS INDEX: 26.93 KG/M2 | OXYGEN SATURATION: 94 % | RESPIRATION RATE: 14 BRPM | HEART RATE: 87 BPM

## 2021-02-17 DIAGNOSIS — R35.1 NOCTURIA: ICD-10-CM

## 2021-02-17 DIAGNOSIS — Z13.6 SCREENING FOR CARDIOVASCULAR CONDITION: ICD-10-CM

## 2021-02-17 DIAGNOSIS — I95.1 ORTHOSTATIC HYPOTENSION DYSAUTONOMIC SYNDROME: ICD-10-CM

## 2021-02-17 DIAGNOSIS — R73.01 IMPAIRED FASTING GLUCOSE: ICD-10-CM

## 2021-02-17 DIAGNOSIS — Z23 NEED FOR PROPHYLACTIC VACCINATION AND INOCULATION AGAINST VARICELLA: ICD-10-CM

## 2021-02-17 DIAGNOSIS — I49.9 IRREGULAR HEART BEAT: ICD-10-CM

## 2021-02-17 DIAGNOSIS — E78.5 DYSLIPIDEMIA: ICD-10-CM

## 2021-02-17 DIAGNOSIS — Z00.00 ROUTINE GENERAL MEDICAL EXAMINATION AT A HEALTH CARE FACILITY: Primary | ICD-10-CM

## 2021-02-17 DIAGNOSIS — R79.89 ABNORMAL THYROID BLOOD TEST: ICD-10-CM

## 2021-02-17 DIAGNOSIS — F32.5 MAJOR DEPRESSIVE DISORDER WITH SINGLE EPISODE, IN FULL REMISSION (HCC): ICD-10-CM

## 2021-02-17 DIAGNOSIS — Z12.5 SCREENING FOR PROSTATE CANCER: ICD-10-CM

## 2021-02-17 DIAGNOSIS — D75.89 MACROCYTOSIS: ICD-10-CM

## 2021-02-17 DIAGNOSIS — I87.2 VENOUS INSUFFICIENCY OF BOTH LOWER EXTREMITIES: ICD-10-CM

## 2021-02-17 PROCEDURE — 99213 OFFICE O/P EST LOW 20 MIN: CPT | Performed by: INTERNAL MEDICINE

## 2021-02-17 PROCEDURE — 4040F PNEUMOC VAC/ADMIN/RCVD: CPT | Performed by: INTERNAL MEDICINE

## 2021-02-17 PROCEDURE — G8417 CALC BMI ABV UP PARAM F/U: HCPCS | Performed by: INTERNAL MEDICINE

## 2021-02-17 PROCEDURE — 3017F COLORECTAL CA SCREEN DOC REV: CPT | Performed by: INTERNAL MEDICINE

## 2021-02-17 PROCEDURE — G8427 DOCREV CUR MEDS BY ELIG CLIN: HCPCS | Performed by: INTERNAL MEDICINE

## 2021-02-17 PROCEDURE — 1123F ACP DISCUSS/DSCN MKR DOCD: CPT | Performed by: INTERNAL MEDICINE

## 2021-02-17 PROCEDURE — G8484 FLU IMMUNIZE NO ADMIN: HCPCS | Performed by: INTERNAL MEDICINE

## 2021-02-17 PROCEDURE — G0439 PPPS, SUBSEQ VISIT: HCPCS | Performed by: INTERNAL MEDICINE

## 2021-02-17 PROCEDURE — G0446 INTENS BEHAVE THER CARDIO DX: HCPCS | Performed by: INTERNAL MEDICINE

## 2021-02-17 PROCEDURE — 93000 ELECTROCARDIOGRAM COMPLETE: CPT | Performed by: INTERNAL MEDICINE

## 2021-02-17 PROCEDURE — 1036F TOBACCO NON-USER: CPT | Performed by: INTERNAL MEDICINE

## 2021-02-17 NOTE — PATIENT INSTRUCTIONS
Personalized Preventive Plan for Kelsey Robles - 2/17/2021  Medicare offers a range of preventive health benefits. Some of the tests and screenings are paid in full while other may be subject to a deductible, co-insurance, and/or copay. Some of these benefits include a comprehensive review of your medical history including lifestyle, illnesses that may run in your family, and various assessments and screenings as appropriate. After reviewing your medical record and screening and assessments performed today your provider may have ordered immunizations, labs, imaging, and/or referrals for you. A list of these orders (if applicable) as well as your Preventive Care list are included within your After Visit Summary for your review. Other Preventive Recommendations:    · A preventive eye exam performed by an eye specialist is recommended every 1-2 years to screen for glaucoma; cataracts, macular degeneration, and other eye disorders. · A preventive dental visit is recommended every 6 months. · Try to get at least 150 minutes of exercise per week or 10,000 steps per day on a pedometer . · Order or download the FREE \"Exercise & Physical Activity: Your Everyday Guide\" from [employer]. Call 6-481.179.3947 or search [employer] online. · You need 1315-0286 mg of calcium and 5982-5060 IU of vitamin D per day. It is possible to meet your calcium requirement with diet alone, but a vitamin D supplement is usually necessary to meet this goal.  · When exposed to the sun, use a sunscreen that protects against both UVA and UVB radiation with an SPF of 30 or greater. Reapply every 2 to 3 hours or after sweating, drying off with a towel, or swimming. · Always wear a seat belt when traveling in a car. Always wear a helmet when riding a bicycle or motorcycle. · Decrease liquids in the evening especially coffee. This may help the nighttime awakenings to void. Should symptoms continue, a urologist may be needed. Patient Education        Prostate Cancer Screening: Care Instructions  Your Care Instructions     Prostate cancer is the abnormal growth of cells in the prostate gland. This is an organ found just below a man's bladder. Screening can help find prostate cancer early. When it is found and treated early, the cancer may be cured. But it's not always treated. That's because the treatments can cause serious side effects. For most men, prostate cancer won't shorten their lives, especially if they are older and the cancer is growing slowly. Prostate cancer is the second most common type of cancer in men. Most cases occur in men older than 72. The disease runs in families. And it's more common in -American men. Follow-up care is a key part of your treatment and safety. Be sure to make and go to all appointments, and call your doctor if you are having problems. It's also a good idea to know your test results and keep a list of the medicines you take. What is the screening test for prostate cancer? The main screening test for prostate cancer is the prostate-specific antigen (PSA) test. This is a blood test that measures how much PSA is in your blood. A high level may mean that you have an enlarged prostate, an infection, or cancer. Along with the PSA test, you may have a digital (finger) rectal exam. This exam checks for anything abnormal in your prostate. To do the exam, the doctor puts a lubricated, gloved finger into your rectum. If these tests suggest cancer, you may need a prostate biopsy. How is prostate cancer diagnosed? In a biopsy, the doctor takes small tissue samples from your prostate gland. Another doctor then looks at the tissue under a microscope to see if there are cancer cells, signs of infection, or other problems. The results help diagnose prostate cancer. What are the pros and cons of screening? Neither a PSA test nor a digital rectal exam can tell you for sure that you do or do not have cancer. But they can help you decide if you need more tests, such as a prostate biopsy. Screening tests may be useful because most men with prostate cancer don't have symptoms. It can be hard to know if you have cancer until it is more advanced. And then it's harder to treat. But having a PSA test can also cause harm. The test may show high levels of PSA that aren't caused by cancer. So you could have a prostate biopsy you didn't need. Or the PSA test might be normal when there is cancer, so a cancer might not be found early. The test can also find cancers that would never have caused a problem during your lifetime. So you might have treatment that was not needed. Prostate cancer usually develops late in life and grows slowly. For many men, it does not shorten their lives. Some experts advise screening only for men who are at high risk. Talk with your doctor to see if screening is right for you. Where can you learn more? Go to https://GELIjose carlos"[email or web address]. org and sign in to your [employer] account. Enter R550 in the [facility] box to learn more about \"Prostate Cancer Screening: Care Instructions. \"     If you do not have an account, please click on the \"Sign Up Now\" link. Current as of: April 29, 2020               Content Version: 12.6  © 8254-5608 [employer]wise, Incorporated. Care instructions adapted under license by [facility] ([facility]). If you have questions about a medical condition or this instruction, always ask your healthcare professional. [place] [address] any warranty or liability for your use of this information. Patient Education        Well Visit, Over 72: Care Instructions  Your Care Instructions     Physical exams can help you stay healthy. Your doctor has checked your overall health and may have suggested ways to take good care of yourself. He or she also may have recommended tests. At home, you can help prevent illness with healthy eating, regular exercise, and other steps. Follow-up care is a key part of your treatment and safety. Be sure to make and go to all appointments, and call your doctor if you are having problems. It's also a good idea to know your test results and keep a list of the medicines you take. How can you care for yourself at home? · Reach and stay at a healthy weight. This will lower your risk for many problems, such as obesity, diabetes, heart disease, and high blood pressure. · Get at least 30 minutes of exercise on most days of the week. Walking is a good choice. You also may want to do other activities, such as running, swimming, cycling, or playing tennis or team sports. · Do not smoke. Smoking can make health problems worse. If you need help quitting, talk to your doctor about stop-smoking programs and medicines. These can increase your chances of quitting for good. · Protect your skin from too much sun. When you're outdoors from 10 a.m. to 4 p.m., stay in the shade or cover up with clothing and a hat with a wide brim. Wear sunglasses that block UV rays. Even when it's cloudy, put broad-spectrum sunscreen (SPF 30 or higher) on any exposed skin. · See a dentist one or two times a year for checkups and to have your teeth cleaned. · Wear a seat belt in the car. Follow your doctor's advice about when to have certain tests. These tests can spot problems early. For men and women  · Cholesterol. Your doctor will tell you how often to have this done based on your overall health and other things that can increase your risk for heart attack and stroke. · Blood pressure. Have your blood pressure checked during a routine doctor visit. Your doctor will tell you how often to check your blood pressure based on your age, your blood pressure results, and other factors. · Diabetes. Ask your doctor whether you should have tests for diabetes. · Vision. Experts recommend that you have yearly exams for glaucoma and other age-related eye problems. · Hearing. Tell your doctor if you notice any change in your hearing. You can have tests to find out how well you hear. · Colon cancer tests. Keep having colon cancer tests as your doctor recommends. You can have one of several types of tests. · Heart attack and stroke risk. At least every 4 to 6 years, you should have your risk for heart attack and stroke assessed. Your doctor uses factors such as your age, blood pressure, cholesterol, and whether you smoke or have diabetes to show what your risk for a heart attack or stroke is over the next 10 years. · Osteoporosis. Talk to your doctor about whether you should have a bone density test to find out whether you have thinning bones. Ask your doctor if you need to take a calcium plus vitamin D supplement. You may be able to get enough calcium and vitamin D through your diet. For women  · Pap test and pelvic exam. You may no longer need a Pap test. Talk with your doctor about whether to stop or continue to have Pap tests. · Breast exam and mammogram. Ask how often you should have a mammogram, which is an X-ray of your breasts. A mammogram can spot breast cancer before it can be felt and when it is easiest to treat. · Thyroid disease. Talk to your doctor about whether to have your thyroid checked as part of a regular physical exam. Women have an increased chance of a thyroid problem. For men  · Prostate exam. Talk to your doctor about whether you should have a blood test (called a PSA test) for prostate cancer. Experts recommend that you discuss the benefits and risks of the test with your doctor before you decide whether to have this test. Some experts say that men ages 79 and older no longer need testing. · Abdominal aortic aneurysm. Ask your doctor whether you should have a test to check for an aneurysm. You may need a test if you ever smoked or if your parent, brother, sister, or child has had an aneurysm. When should you call for help? Watch closely for changes in your health, and be sure to contact your doctor if you have any problems or symptoms that concern you. Where can you learn more? Go to https://[email or web address]. org and sign in to your [employer] account. Enter C014 in the [employer] box to learn more about \"Well Visit, Over 65: Care Instructions. \"     If you do not have an account, please click on the \"Sign Up Now\" link. Current as of: May 27, 2020               Content Version: 12.6  © 2006-2020 [employer]. Care instructions adapted under license by [employer] [facility] ([facility]). If you have questions about a medical condition or this instruction, always ask your healthcare professional. [place] [address] any warranty or liability for your use of this information. Patient Education        Recombinant Zoster (Shingles) Vaccine: What You Need to Know  Why get vaccinated? Recombinant zoster (shingles) vaccine can prevent shingles. Shingles (also called herpes zoster, or just zoster) is a painful skin rash, usually with blisters. In addition to the rash, shingles can cause fever, headache, chills, or upset stomach. More rarely, shingles can lead to pneumonia, hearing problems, blindness, brain inflammation (encephalitis), or death. The most common complication of shingles is long-term nerve pain called postherpetic neuralgia (PHN). PHN occurs in the areas where the shingles rash was, even after the rash clears up. It can last for months or years after the rash goes away. The pain from PHN can be severe and debilitating. About 10 to 18% of people who get shingles will experience PHN. The risk of PHN increases with age. An older adult with shingles is more likely to develop PHN and have longer lasting and more severe pain than a younger person with shingles. Shingles is caused by the varicella zoster virus, the same virus that causes chickenpox. After you have chickenpox, the virus stays in your body and can cause shingles later in life. Shingles cannot be passed from one person to another, but the virus that causes shingles can spread and cause chickenpox in someone who had never had chickenpox or received chickenpox vaccine. Recombinant shingles vaccine  Recombinant shingles vaccine provides strong protection against shingles. By preventing shingles, recombinant shingles vaccine also protects against PHN. Recombinant shingles vaccine is the preferred vaccine for the prevention of shingles. However, a different vaccine, live shingles vaccine, may be used in some circumstances. The recombinant shingles vaccine is recommended for adults 50 years and older without serious immune problems. It is given as a two-dose series. This vaccine is also recommended for people who have already gotten another type of shingles vaccine, the live shingles vaccine. There is no live virus in this vaccine. Shingles vaccine may be given at the same time as other vaccines. Talk with your health care provider  Tell your vaccine provider if the person getting the vaccine:  · Has had an allergic reaction after a previous dose of recombinant shingles vaccine, or has any severe, life-threatening allergies. · Is pregnant or breastfeeding. · Is currently experiencing an episode of shingles. In some cases, your health care provider may decide to postpone shingles vaccination to a future visit. People with minor illnesses, such as a cold, may be vaccinated. People who are moderately or severely ill should usually wait until they recover before getting recombinant shingles vaccine. Your health care provider can give you more information. Risks of a vaccine reaction  · A sore arm with mild or moderate pain is very common after recombinant shingles vaccine, affecting about 80% of vaccinated people. Redness and swelling can also happen at the site of the injection. · Tiredness, muscle pain, headache, shivering, fever, stomach pain, and nausea happen after vaccination in more than half of people who receive recombinant shingles vaccine. In clinical trials, about 1 out of 6 people who got recombinant zoster vaccine experienced side effects that prevented them from doing regular activities. Symptoms usually went away on their own in 2 to 3 days. You should still get the second dose of recombinant zoster vaccine even if you had one of these reactions after the first dose. People sometimes faint after medical procedures, including vaccination. Tell your provider if you feel dizzy or have vision changes or ringing in the ears. As with any medicine, there is a very remote chance of a vaccine causing a severe allergic reaction, other serious injury, or death. What if there is a serious problem? An allergic reaction could occur after the vaccinated person leaves the clinic. If you see signs of a severe allergic reaction (hives, swelling of the face and throat, difficulty breathing, a fast heartbeat, dizziness, or weakness), call 9-1-1 and get the person to the nearest hospital.  For other signs that concern you, call your health care provider. Adverse reactions should be reported to the Vaccine Adverse Event Reporting System (VAERS). Your health care provider will usually file this report, or you can do it yourself. Visit the VAERS website at www.vaers. [facility].gov or call 9-991.592.6760. VAERS is only for reporting reactions, and VAERS staff do not give medical advice. How can I learn more? · Ask your health care provider. · Call your local or state health department. · Contact the Centers for Disease Control and Prevention (CDC):  ? Call 1-732.693.5796 (1-800-CDC-INFO) or  ? Visit CDC's website at www.cdc.gov/vaccines  Vaccine Information Statement  Recombinant Zoster Vaccine  10/30/2019  [facility] for Disease Control and Prevention  Many Vaccine Information Statements are available in Mongolian and other languages. See www.immunize.org/vis. Hojas de Información Sobre Vacunas están disponibles en Español y en muchos otros idiomas. Visite Yenifer.si   Care instructions adapted under license by [facility] ([facility]). If you have questions about a medical condition or this instruction, always ask your healthcare professional. [place] [address] any warranty or liability for your use of this information.

## 2021-02-17 NOTE — PROGRESS NOTES
FOLLOW UP WITH DR. SHEPARD on 2/19/21 at 1020am- in person  NURSE VISIT:  Next week-- Wed feb 10th  LABS DUE: today   [facility]) Physicians  Internal Medicine  Patient Encounter  [address] [facility], D.O., [facility]         Medicare Annual Wellness Visit  Name: Dione Wei Date: 2021   MRN: [id number] Sex: Male   Age: 69 y.o. Ethnicity: Non-/Non    : 1950 Race: Sonia Roche is here for Medicare AWV    Screenings for behavioral, psychosocial and functional/safety risks, and cognitive dysfunction are all negative except as indicated below. These results, as well as other patient data from the [address] form, are documented in Flowsheets linked to this Encounter. Patient is also requesting review of his current chronic medical problems as the below along with a medication review and reconciliation. Overall he states he has been feeling well. He denies any new problems with his medications. Patient has a history of major depressive disorder/anxiety/OCD/alcohol abuse, orthostatic hypotension, hypertension, chronic low back pain with lumbar degenerative disc disease and facet arthropathy, varicose veins, venous insufficiency, osteopenia, hyperlipidemia. Review of previous DEXA scan  actually shows a normal bone mineral density. Patient denies any chest pain or tightness. He denies any shortness of breath, cough, wheezing. He denies any abdominal pain, nausea, vomiting, diarrhea. He continues to have periods of brief orthostatic dizziness. He denies any syncopal episodes. His lower extremity swelling is well controlled with compression stockings.     Medical/Surgical Histories     Past Medical History:   Diagnosis Date    Acquired spondylolisthesis of lumbosacral region 2019    Alcoholism ([address]     Anxiety     Basal cell cancer 2010    Dr. Nicolas [facility] Chronic bilateral low back pain without sciatica 2019    DDD (degenerative disc disease), lumbar 2019    Depression     DVT, lower extremity ([address] 2015    Right, gastroc, soleal    Hyperlipidemia     Hypertension     Lumbar facet arthropathy 2/15/2019    Lumbar foraminal stenosis 2/15/2019    Osteopenia     Squamous cell skin cancer 2017    right ear lobe    Stasis dermatitis     Varicose veins of both lower extremities     Venous insufficiency of both lower extremities        Past Surgical History:   Procedure Laterality Date    ANESTHESIA NERVE BLOCK Bilateral 3/25/2019    BILATERAL L3,L4,L5 DR MEDIAL BRANCH BLOCKS WITH FLUOROSCOPY performed by Choco Bateman MD at [address] [facility]      from [facility] Bilateral 3/4/2019    BILATERAL L3, L4, L5 DORSAL RAMUS LUMBAR MEDIAL BRANCH BLOCK WITH FLUOROSCOPY performed by Choco Bateman MD at [facility] Bilateral 6/3/2019    BILATERAL L3,L4,L5 DORSAL RAMUS RADIOFRQUENCY ABLATION WITH FLUOROSCOPY performed by Choco Bateman MD at [address]  02/2019    Uvula lesion, papilloma, DrLauren Nuñez  8/2010    SKIN CANCER EXCISION Right 2017    Debra Venegasee. Medications/Allergies     Current Outpatient Medications   Medication Sig Dispense Refill    zoster recombinant adjuvanted vaccine (SHINGRIX) 50 MCG/0.5ML SUSR injection Inject 0.5 mLs into the muscle once for 1 dose . Repeat in 2-6 months 0.5 mL 1    clomiPRAMINE (ANAFRANIL) 75 MG capsule Take 2 capsules by mouth nightly 180 capsule 3    metoprolol succinate (TOPROL XL) 50 MG extended release tablet Take 0.5 tablets by mouth daily 90 tablet 3    fenofibrate (TRIGLIDE) 160 MG tablet TAKE 1 TABLET BY MOUTH  DAILY 90 tablet 3    aspirin 81 MG tablet Take 81 mg by mouth daily       No current facility-administered medications for this visit.          No Known Allergies      Substance Use History     Social History     Tobacco Use    Smoking status: Never Smoker    Smokeless tobacco: Never Used   Substance Use Topics    Alcohol use: No     Comment: Quit 11/3/1992    Drug use: No     Comment: 1992 Narcotic, Benzo, alcohol addition          Family History     Family History   Problem Relation Age of Onset    High Blood Pressure Mother     Heart Disease Mother         a fib    Heart Disease Father     Diabetes Father     Heart Attack Father              CareTeam (Including outside providers/suppliers regularly involved in providing care):   Patient Care Team:  Katerine Sheikh DO as PCP - [address] [place] [address],  as PCP - [facility] Empaneled Provider  Marvina Cowden, MD (Vascular Surgery)  Sherryn Claude, MD as Consulting Physician (Pain Management)  William Fuentes as Consulting Physician (Dermatology)  Lizzette Francis MD as Consulting Physician (Gastroenterology)  Anatoly Sagastume DO (Optometry)          Based upon direct observation of the patient, evaluation of cognition reveals recent and remote memory intact. ROS:  MS:  Low back is doing well. S/P RFA in 6/2019. :  + Nocturia 3 x per night. Liquids in the evening. Stream is OK. Physical Exam    Vitals:    02/17/21 0756   BP: 130/80   Pulse: 87   Resp: 14   Temp: 97.7 °F (36.5 °C)   TempSrc: Temporal   SpO2: 94%   Weight: 181 lb 12.8 oz (82.5 kg)   Height: 5' 9\" (1.753 m)     Body mass index is 26.85 kg/m². Wt Readings from Last 3 Encounters:   02/17/21 181 lb 12.8 oz (82.5 kg)   11/16/20 180 lb 6.4 oz (81.8 kg)   10/12/20 177 lb 9.6 oz (80.6 kg)     BP Readings from Last 3 Encounters:   02/17/21 130/80   11/16/20 130/72   10/12/20 100/80      GEN:  69 y.o. male who is in NAD, A&O. He appears stated age and well nourished. He is cooperative and pleasant. HEAD:  NC/AT, no lesions. EYES:  ERUM, EOMI, No scleral icterus or conjunctival injection or discharge. Visual fields in tact to confrontation. EARS:  EAC's clear, TM's normal.  NECK:  Supple. Full ROM. Trachea is midline. No increased JVD. No thyromegaly or nodules. No masses  LYMPH: No C/SC/A/F nodes  CARDIAC:  S1S2 NL. Regular rhythm. No murmur/clicks/rubs. + Frequent ectopy vs irregular vs rate variation. PMI is non-displaced. VASC:  Pedal pulses 2/4. Carotid upstrokes 2+. No bruits noted. PULM:  Lungs are CTA. Symmetric breath sounds noted. AP Diameter NL. No dullness to percussion. GI:  Abdomen is soft and nontender. No distension. No organomegaly. No masses. No pulsatile masses. BREAST: No masses  EXT:  No Cyanosis or clubbing. No edema. SKIN: Warm and dry, normal turgor, no rash or lesions of concern. NEURO: No focal or lateralizing deficits. PSYCH:  Mood and affect NL. Judgement and insight NL. Patient's complete Health Risk Assessment and screening values have been reviewed and are found in Flowsheets. The following problems were reviewed today and where indicated follow up appointments were made and/or referrals ordered. Positive Risk Factor Screenings with Interventions:               No Positive Risk Factors identified today.           Personalized Preventive Plan   Current Health Maintenance Status  Immunization History   Administered Date(s) Administered    COVID-19, Moderna, 100mcg/0.5ml 01/14/2021, 02/12/2021    Hepatitis A 10/20/2015, 04/18/2016    Hepatitis B (Engerix-B) 10/20/2015, 04/18/2016    Hepatitis B (Recombivax HB) 12/29/2015    Influenza Vaccine, unspecified formulation 11/11/2016    Influenza Virus Vaccine 10/16/2013, 09/09/2015, 09/28/2015    Influenza, High Dose (Fluzone 65 yrs and older) 09/09/2017, 10/03/2018    Influenza, Quadv, adjuvanted, 65 yrs +, IM, PF (Fluad) 09/24/2020    Pneumococcal Conjugate 13-valent ([id number]) 10/20/2015    Pneumococcal Polysaccharide ([id number]) 06/27/2014    Tdap (Boostrix, Adacel) 01/26/2017    Tetanus 09/19/2007    Zoster Live (Zostavax) 06/18/2008        Health Maintenance   Topic Date Due    Shingles Vaccine (2 of 3) 08/13/2008    Annual Wellness Visit (AWV)  05/29/2019    Potassium monitoring  05/27/2021    Creatinine hypotension dysautonomic syndrome (HCC)  --Condition is improved though he continues to have brief periods of orthostatic if he stands up too quickly  --Continue compression stockings  --Avoid salt restriction for now    Major depressive disorder with single episode, in full remission ([facility]  --In remission    Screening for prostate cancer  -     PSA screening; Future    Impaired fasting glucose  --Condition stability and control are uncertain. Due for lab  --Continue a sensible low simple sugar diet. -     Comprehensive Metabolic Panel; Future  -     Hemoglobin A1C; Future    Venous insufficiency of both lower extremities  --Condition is well controlled with the compression stockings  --Continue to monitor for increasing edema  --At some point, patient will need to revisit with vascular. His previous vascular surgeon has left the group. Will refer to Dr. Dayna Shelton    Irregular heart beat  --EKG confirms a sinus rhythm with rate variation (sinus arrhythmia)  -     EKG 12 Lead    Dyslipidemia  --Condition stability and control are uncertain. Due for lab  --Continue lifestyle approach as noted above  -     Lipid Panel; Future  -     Comprehensive Metabolic Panel; Future  -     TSH without Reflex; Future    Macrocytosis  -     CBC Auto Differential; Future  -     Vitamin B12 & Folate; Future    Nocturia  --Etiology is unclear. This may relate to liquids including coffee intake late into the evening. --Restrict fluids in the evening  --May need urology                 Cardiovascular Disease Risk Counseling: Assessed the patient's risk to develop cardiovascular disease and reviewed main risk factors.    Reviewed steps to reduce disease risk including:   · Quitting tobacco use, reducing amount smoked, or not starting the habit  · Making healthy food choices  · Being physically active and gradualy increasing activity levels   · Reduce weight and determine a healthy BMI goal  · Monitor blood pressure and treat if higher than 140/90 mmHg  · Maintain blood total cholesterol levels under 5 mmol/l or 190 mg/dl  · Maintain LDL cholesterol levels under 3.0 mmol/l or 115 mg/dl   · Control blood glucose levels  · Consider taking aspirin (75 mg daily), once blood pressure is controlled   Provided a follow up plan.   Time spent (minutes): 15 minutes

## 2021-05-11 ASSESSMENT — ENCOUNTER SYMPTOMS
EYE DISCHARGE: 0
VOMITING: 0
ABDOMINAL PAIN: 0
COLOR CHANGE: 0
COUGH: 0
FACIAL SWELLING: 0
CHEST TIGHTNESS: 0
WHEEZING: 0
SHORTNESS OF BREATH: 0
ABDOMINAL DISTENTION: 0
BLOOD IN STOOL: 0
BACK PAIN: 0

## 2021-05-11 NOTE — PROGRESS NOTES
papilloma, Dr. Anya Linda  8/2010    SKIN CANCER EXCISION Right 2017    [facility]. Social HIstory  Social History     Tobacco Use    Smoking status: Never Smoker    Smokeless tobacco: Never Used   Substance Use Topics    Alcohol use: No     Comment: Quit 11/3/1992    Drug use: No     Comment: 1992 Narcotic, Benzo, alcohol addition       Family History  Family History   Problem Relation Age of Onset    High Blood Pressure Mother     Heart Disease Mother         a fib    Heart Disease Father     Diabetes Father     Heart Attack Father        Allergies   No Known Allergies    Medications:     Home Medications:  Were reviewed and are listed in nursing record. and/or listed below    Prior to Admission medications    Medication Sig Start Date End Date Taking? Authorizing Provider   clomiPRAMINE (ANAFRANIL) 75 MG capsule Take 2 capsules by mouth nightly 9/24/20  Yes Keith Valenzuela, DO   metoprolol succinate (TOPROL XL) 50 MG extended release tablet Take 0.5 tablets by mouth daily 8/13/20  Yes Keith Valenzuela DO   fenofibrate (TRIGLIDE) 160 MG tablet TAKE 1 TABLET BY MOUTH  DAILY 4/6/20  Yes Keith Valenzuela DO   aspirin 81 MG tablet Take 81 mg by mouth daily   Yes Historical Provider, MD        Review of Systems   Constitutional: Negative for activity change, appetite change, diaphoresis, fatigue, fever and unexpected weight change. HENT: Negative for congestion, facial swelling, mouth sores and nosebleeds. Eyes: Negative for discharge and visual disturbance. Respiratory: Negative for cough, chest tightness, shortness of breath and wheezing. Cardiovascular: Negative for chest pain, palpitations and leg swelling. Gastrointestinal: Negative for abdominal distention, abdominal pain, blood in stool and vomiting. Endocrine: Negative for cold intolerance, heat intolerance and polyuria. Genitourinary: Negative for difficulty urinating, dysuria, frequency and hematuria. Musculoskeletal: Negative for back pain, joint swelling, myalgias and neck pain. Skin: Negative for color change, pallor and rash. Allergic/Immunologic: Negative for immunocompromised state. Neurological: Positive for light-headedness. Negative for dizziness, syncope, weakness, numbness and headaches. Hematological: Negative for adenopathy. Does not bruise/bleed easily. Psychiatric/Behavioral: Negative for behavioral problems, confusion, decreased concentration and suicidal ideas. The patient is not nervous/anxious. Vitals:    05/18/21 1204   BP: 110/70   Pulse:    SpO2:     Weight: 185 lb (83.9 kg)       Vitals:    05/18/21 1200 05/18/21 1204   BP: 110/70 110/70   Site: Left Upper Arm    Position: Sitting Standing   Cuff Size: Medium Adult    Pulse: 68    SpO2: 97%    Weight: 185 lb (83.9 kg)    Height: 5' 9\" (1.753 m)        BP Readings from Last 3 Encounters:   05/18/21 110/70   02/17/21 130/80   11/16/20 130/72       Wt Readings from Last 3 Encounters:   05/18/21 185 lb (83.9 kg)   02/17/21 181 lb 12.8 oz (82.5 kg)   11/16/20 180 lb 6.4 oz (81.8 kg)       Physical Exam  Constitutional:       General: He is not in acute distress. Appearance: He is well-developed. He is not diaphoretic. HENT:      Head: Normocephalic and atraumatic. Eyes:      Pupils: Pupils are equal, round, and reactive to light. Neck:      Musculoskeletal: Normal range of motion. Thyroid: No thyromegaly. Vascular: No JVD. Cardiovascular:      Rate and Rhythm: Normal rate and regular rhythm. Chest Wall: PMI is not displaced. Heart sounds: Normal heart sounds, S1 normal and S2 normal. No murmur. No friction rub. No gallop. Pulmonary:      Effort: Pulmonary effort is normal. No respiratory distress. Breath sounds: Normal breath sounds. No stridor. No wheezing or rales. Chest:      Chest wall: No tenderness. Abdominal:      General: Bowel sounds are normal. There is no distension. Palpations: Abdomen is soft. Tenderness: There is no abdominal tenderness. There is no guarding or rebound. Musculoskeletal: Normal range of motion. General: No tenderness. Lymphadenopathy:      Cervical: No cervical adenopathy. Skin:     General: Skin is warm and dry. Findings: No erythema or rash. Neurological:      Mental Status: He is alert and oriented to person, place, and time. Coordination: Coordination normal.   Psychiatric:         Behavior: Behavior normal.         Thought Content:  Thought content normal.         Judgment: Judgment normal.         Labs:       Lab Results   Component Value Date    WBC 7.2 02/17/2021    HGB 13.5 02/17/2021    HCT 41.4 02/17/2021    .6 (H) 02/17/2021     02/17/2021     Lab Results   Component Value Date     (L) 02/17/2021    K 4.5 02/17/2021    CL 97 (L) 02/17/2021    CO2 26 02/17/2021    BUN 21 (H) 02/17/2021    CREATININE 1.0 02/17/2021    GLUCOSE 100 (H) 02/17/2021    CALCIUM 9.5 02/17/2021    PROT 7.1 02/17/2021    LABALBU 4.4 02/17/2021    BILITOT 0.3 02/17/2021    ALKPHOS 70 02/17/2021    AST 25 02/17/2021    ALT 19 02/17/2021    LABGLOM >60 02/17/2021    GFRAA >60 02/17/2021    AGRATIO 1.6 02/17/2021    GLOB 2.7 02/17/2021         Lab Results   Component Value Date    CHOL 146 02/17/2021    CHOL 144 05/27/2020    CHOL 139 02/10/2020     Lab Results   Component Value Date    TRIG 138 02/17/2021    TRIG 153 (H) 05/27/2020    TRIG 77 02/10/2020     Lab Results   Component Value Date    HDL 46 02/17/2021    HDL 41 05/27/2020    HDL 52 02/10/2020     Lab Results   Component Value Date    LDLCALC 72 02/17/2021    LDLCALC 72 05/27/2020    LDLCALC 72 02/10/2020     Lab Results   Component Value Date    LABVLDL 28 02/17/2021    LABVLDL 31 05/27/2020    LABVLDL 15 02/10/2020     No results found for: CHOLHDLRATIO    No results found for: INR, PROTIME    The 10-year ASCVD risk score (Ihsan Crawford, et al., 2013) is: 13.6% present. Thank you for allowing me to participate [facility] or [facility] SARA Moscoso MD, [facility]

## 2021-05-18 ENCOUNTER — OFFICE VISIT (OUTPATIENT)
Dept: CARDIOLOGY CLINIC | Age: 71
End: 2021-05-18
Payer: MEDICARE

## 2021-05-18 VITALS
HEIGHT: 69 IN | OXYGEN SATURATION: 97 % | BODY MASS INDEX: 27.4 KG/M2 | SYSTOLIC BLOOD PRESSURE: 110 MMHG | DIASTOLIC BLOOD PRESSURE: 70 MMHG | WEIGHT: 185 LBS | HEART RATE: 68 BPM

## 2021-05-18 DIAGNOSIS — I95.1 ORTHOSTATIC HYPOTENSION DYSAUTONOMIC SYNDROME: ICD-10-CM

## 2021-05-18 DIAGNOSIS — I10 BENIGN ESSENTIAL HTN: ICD-10-CM

## 2021-05-18 PROCEDURE — 99213 OFFICE O/P EST LOW 20 MIN: CPT | Performed by: INTERNAL MEDICINE

## 2021-05-18 PROCEDURE — 3017F COLORECTAL CA SCREEN DOC REV: CPT | Performed by: INTERNAL MEDICINE

## 2021-05-18 PROCEDURE — 1036F TOBACCO NON-USER: CPT | Performed by: INTERNAL MEDICINE

## 2021-05-18 PROCEDURE — 4040F PNEUMOC VAC/ADMIN/RCVD: CPT | Performed by: INTERNAL MEDICINE

## 2021-05-18 PROCEDURE — G8417 CALC BMI ABV UP PARAM F/U: HCPCS | Performed by: INTERNAL MEDICINE

## 2021-05-18 PROCEDURE — 1123F ACP DISCUSS/DSCN MKR DOCD: CPT | Performed by: INTERNAL MEDICINE

## 2021-05-18 PROCEDURE — G8427 DOCREV CUR MEDS BY ELIG CLIN: HCPCS | Performed by: INTERNAL MEDICINE

## 2021-05-26 RX ORDER — FENOFIBRATE 160 MG/1
160 TABLET ORAL DAILY
Qty: 90 TABLET | Refills: 3 | Status: SHIPPED | OUTPATIENT
Start: 2021-05-26 | End: 2021-08-23 | Stop reason: SDUPTHER

## 2021-08-10 ENCOUNTER — TELEMEDICINE (OUTPATIENT)
Dept: INTERNAL MEDICINE CLINIC | Age: 71
End: 2021-08-10
Payer: MEDICARE

## 2021-08-10 DIAGNOSIS — R73.01 IMPAIRED FASTING GLUCOSE: Primary | ICD-10-CM

## 2021-08-10 DIAGNOSIS — F10.11 HISTORY OF ALCOHOL ABUSE: ICD-10-CM

## 2021-08-10 DIAGNOSIS — I87.2 VENOUS INSUFFICIENCY OF BOTH LOWER EXTREMITIES: ICD-10-CM

## 2021-08-10 DIAGNOSIS — I95.1 ORTHOSTATIC HYPOTENSION DYSAUTONOMIC SYNDROME: ICD-10-CM

## 2021-08-10 DIAGNOSIS — E29.1 MALE HYPOGONADISM: ICD-10-CM

## 2021-08-10 DIAGNOSIS — E78.5 DYSLIPIDEMIA: ICD-10-CM

## 2021-08-10 PROCEDURE — 99214 OFFICE O/P EST MOD 30 MIN: CPT | Performed by: INTERNAL MEDICINE

## 2021-08-10 PROCEDURE — 1123F ACP DISCUSS/DSCN MKR DOCD: CPT | Performed by: INTERNAL MEDICINE

## 2021-08-10 PROCEDURE — G8427 DOCREV CUR MEDS BY ELIG CLIN: HCPCS | Performed by: INTERNAL MEDICINE

## 2021-08-10 PROCEDURE — 3017F COLORECTAL CA SCREEN DOC REV: CPT | Performed by: INTERNAL MEDICINE

## 2021-08-10 PROCEDURE — 4040F PNEUMOC VAC/ADMIN/RCVD: CPT | Performed by: INTERNAL MEDICINE

## 2021-08-10 SDOH — ECONOMIC STABILITY: TRANSPORTATION INSECURITY
IN THE PAST 12 MONTHS, HAS THE LACK OF TRANSPORTATION KEPT YOU FROM MEDICAL APPOINTMENTS OR FROM GETTING MEDICATIONS?: NO

## 2021-08-10 SDOH — ECONOMIC STABILITY: FOOD INSECURITY: WITHIN THE PAST 12 MONTHS, YOU WORRIED THAT YOUR FOOD WOULD RUN OUT BEFORE YOU GOT MONEY TO BUY MORE.: NEVER TRUE

## 2021-08-10 SDOH — ECONOMIC STABILITY: HOUSING INSECURITY
IN THE LAST 12 MONTHS, WAS THERE A TIME WHEN YOU DID NOT HAVE A STEADY PLACE TO SLEEP OR SLEPT IN A SHELTER (INCLUDING NOW)?: NO

## 2021-08-10 SDOH — ECONOMIC STABILITY: INCOME INSECURITY: IN THE LAST 12 MONTHS, WAS THERE A TIME WHEN YOU WERE NOT ABLE TO PAY THE MORTGAGE OR RENT ON TIME?: NO

## 2021-08-10 SDOH — ECONOMIC STABILITY: TRANSPORTATION INSECURITY
IN THE PAST 12 MONTHS, HAS LACK OF TRANSPORTATION KEPT YOU FROM MEETINGS, WORK, OR FROM GETTING THINGS NEEDED FOR DAILY LIVING?: NO

## 2021-08-10 SDOH — ECONOMIC STABILITY: FOOD INSECURITY: WITHIN THE PAST 12 MONTHS, THE FOOD YOU BOUGHT JUST DIDN'T LAST AND YOU DIDN'T HAVE MONEY TO GET MORE.: NEVER TRUE

## 2021-08-10 ASSESSMENT — SOCIAL DETERMINANTS OF HEALTH (SDOH): HOW HARD IS IT FOR YOU TO PAY FOR THE VERY BASICS LIKE FOOD, HOUSING, MEDICAL CARE, AND HEATING?: NOT HARD AT ALL

## 2021-08-10 NOTE — PROGRESS NOTES
8/10/2021    [facility]) Physicians  Internal Medicine  Patient Encounter  Laurie Ruiz D.O., [address] -- Audio/Visual (During TTS-30 public health emergency)      I discussed at this telephone/video visit is a nontraditional type of visit that we are conducting in lieu of an office visit to minimize patient risk during the coronavirus pandemic. I discussed with the patient that I would not be able to perform a full physical examination, but that in most other respects the visit would be beneficial to his/her continued medical care. He/She again gave verbal consent for us to conduct this type of visit. Chief Complaint   Patient presents with    Medication Check    Check-Up         HPI:    Maylin Dias (:  1950) has requested an audio/video evaluation for the following concern(s): Check up     66 y.o. male being evaluated via virtual video visit due to the coronavirus pandemic emergency and public health crisis and inability to see the patient face-to-face in the office. Patient is being evaluated today regarding the status of current chronic medical problems as the below along with a medication review and reconciliation. Patient states he has been feeling well and offers no new concerns. He is tolerating his medications well. Medication compliance is excellent. Hyperlipidemia:    Lab Results   Component Value Date    LDLCALC 72 2021    He remains on Fenofibrate 160 mg daily. Patient tolerates the fenofibrate well without any adverse side effects. He is due for liver tests. HTN--Pt denies any new problems with his blood pressure. He has had issues with orthostatic hypotension dysautonomia. He did have a follow-up with his cardiologist, Dr. Tammie Avery. No new changes were made. Over the last year and a half or so his Toprol-XL dose has been lowered. He was on the beta-blocker really for anxiety.   This was used many years ago by another physician. We were able to lower the dose to 25 mg. He denies any headaches or dizziness. He denies any lightheadedness or syncope. He has had no TIA or stroke related symptoms. Patient continues to have transient orthostatic dizziness but this is tolerable. Venous reflux-- Previous history----> H/O Left calf DVT x 1. He was on Eliquis for 3 months. He has also had Superficial thrombophlebitis. He was on Ibuprofen for that. He did have a repeat duplex which showed reflux. No surgery recommended. Compression stockings recommended. Duplex--   Impressions   Right Impression   No evidence of deep or superficial venous thrombosis of the right lower   extremity. Evidence of severe venous insufficiency of the right CFV, saphenofemoral   junction (reflux of 0.5 seconds), anterior accessory saphenous vein (reflux of   1 second) that extend to the knee varicosities. Left Impression   No evidence of deep or superficial venous thrombosis of the left lower   extremity. Severe valvular incompetence of the left CFV, saphenofemoral junction (reflux   of 2.7 seconds), great saphenous vein (proximal to mid calf). Interval history----> patient states he has not had any swelling. He states his walking has helped. He denies any calf pain. He does not wear his compression stockings at this time. Major depressive disorder--Patient has a remote history of alcoholism. Patient remained sober for the last 29 years. He feels his mood is under good control. A little over a year ago after attempting to wean off his Anafranil, his anxiety and depression increased. He has a history of anxiety and OCD and alcoholism. After coming off of the Anafranil he started to have more obsessive thoughts and feeling like he was not in control. He had periods of apprehension, fearfulness, nervousness, heart palpitations and heart racing.   Overall, the patient states he is doing much better    Past Medical History: Diagnosis Date    Acquired spondylolisthesis of lumbosacral region 2/8/2019    [facility] [address]     Anxiety     Basal cell cancer 8/2010    Dr. Isaiah Guajardo Chronic bilateral low back pain without sciatica 2/8/2019    DDD (degenerative disc disease), lumbar 2/8/2019    Depression     DVT, lower extremity ([address] 5/29/2015    Right, gastroc, soleal    Hyperlipidemia     Hypertension     Lumbar facet arthropathy 2/15/2019    Lumbar foraminal stenosis 2/15/2019    Osteopenia     Squamous cell skin cancer 2017    right ear lobe    Stasis dermatitis     Varicose veins of both lower extremities     Venous insufficiency of both lower extremities        Medication Sig   fenofibrate (TRIGLIDE) 160 MG tablet Take 1 tablet by mouth daily   clomiPRAMINE (ANAFRANIL) 75 MG capsule Take 2 capsules by mouth nightly   metoprolol succinate (TOPROL XL) 50 MG extended release tablet Take 0.5 tablets by mouth daily   aspirin 81 MG tablet Take 81 mg by mouth daily          Review of Systems  As per HPI      PHYSICAL EXAMINATION:    Vital Signs: (As obtained by patient/caregiver or practitioner observation)    Patient-Reported Vitals 8/10/2021   Patient-Reported Weight 180#   Patient-Reported Height 5ft9   Patient-Reported Systolic 534   Patient-Reported Diastolic 70   Patient-Reported Pulse 72   Patient-Reported Temperature 96.9          Wt Readings from Last 3 Encounters:   05/18/21 185 lb (83.9 kg)   02/17/21 181 lb 12.8 oz (82.5 kg)   11/16/20 180 lb 6.4 oz (81.8 kg)     BP Readings from Last 3 Encounters:   05/18/21 110/70   02/17/21 130/80   11/16/20 130/72           Physical Exam  Constitutional:       General: He is not in acute distress. Appearance: Normal appearance. He is normal weight. He is not ill-appearing. HENT:      Head: Normocephalic and atraumatic. Eyes:      General: No scleral icterus.      Conjunctiva/sclera: Conjunctivae normal.   Pulmonary:      Effort: Pulmonary effort is normal. No respiratory distress. Musculoskeletal:      Right lower leg: No edema. Left lower leg: No edema. Neurological:      Mental Status: He is alert and oriented to person, place, and time. Psychiatric:         Mood and Affect: Mood normal.         Behavior: Behavior normal.         Thought Content: Thought content normal.         Judgment: Judgment normal.           Other pertinent observable physical exam findings-     Due to this being a TeleHealth encounter, evaluation of the following organ systems is limited: Vitals/Constitutional/EENT/Resp/CV/GI//MS/Neuro/Skin/Heme-Lymph-Imm. ASSESSMENT/PLAN:  1. Impaired fasting glucose  Condition stability and control are uncertain. Due for lab  Continue lifestyle modification.  - Hemoglobin A1C; Future  - Comprehensive Metabolic Panel; Future    2. Dyslipidemia  Condition stability and control are uncertain. Due for lab  Continue efforts with lifestyle modification. Continue fenofibrate for now. - Comprehensive Metabolic Panel; Future  - Lipid Panel; Future    3. Orthostatic hypotension dysautonomic syndrome  Condition is much improved  Continue to monitor for recurrent and worsening of orthostatic dizziness    4. History of alcohol abuse  Condition is under excellent control. He has been sober for 29 years. Continue Anafranil    5. Male hypogonadism  Condition stability is uncertain. He is no longer on the testosterone replacement  Continue to monitor for symptoms    6. Venous insufficiency  Condition is much improved  Continue to monitor for worsening swelling  Recommend compression stockings for long car rides and as needed      Return in about 6 months (around 2/10/2022) for Medicare Annual Wellness Visit. An  electronic signature was used to authenticate this note.     --Joel Stanley DO on 8/10/2021 at 8:28 AM    119}    Pursuant to the emergency declaration under the [facility], [facility] waiver authority and the Coronavirus Preparedness and Response Supplemental Appropriations Act, this Virtual  Visit was conducted, with patient's consent, to reduce the patient's risk of exposure to COVID-19 and provide continuity of care for an established patient. Services were provided through a video synchronous discussion virtually to substitute for in-person clinic visit.

## 2021-08-23 RX ORDER — FENOFIBRATE 160 MG/1
160 TABLET ORAL DAILY
Qty: 90 TABLET | Refills: 3 | Status: SHIPPED | OUTPATIENT
Start: 2021-08-23 | End: 2022-05-19

## 2021-09-12 DIAGNOSIS — F41.9 ANXIETY: ICD-10-CM

## 2021-09-12 DIAGNOSIS — F42.8 OBSESSIVE THINKING: ICD-10-CM

## 2021-09-12 DIAGNOSIS — F32.5 MAJOR DEPRESSIVE DISORDER WITH SINGLE EPISODE, IN FULL REMISSION (HCC): ICD-10-CM

## 2021-09-13 RX ORDER — CLOMIPRAMINE HYDROCHLORIDE 75 MG/1
CAPSULE ORAL
Qty: 180 CAPSULE | Refills: 3 | Status: SHIPPED | OUTPATIENT
Start: 2021-09-13 | End: 2022-09-11

## 2021-10-05 ENCOUNTER — PATIENT MESSAGE (OUTPATIENT)
Dept: INTERNAL MEDICINE CLINIC | Age: 71
End: 2021-10-05

## 2021-10-05 NOTE — TELEPHONE ENCOUNTER
From: Maria Isabel Treviño  To: Suella Bamberger, DO  Sent: 10/5/2021 12:20 AM EDT  Subject: Non-Urgent Medical Question    Keith,just wanted to let you know that I got my regular Flu Vaccine on 10/4/21. Thank you.

## 2021-10-27 DIAGNOSIS — R00.0 RACING HEART BEAT: ICD-10-CM

## 2021-10-27 RX ORDER — METOPROLOL SUCCINATE 50 MG/1
TABLET, EXTENDED RELEASE ORAL
Qty: 90 TABLET | Refills: 3 | Status: SHIPPED | OUTPATIENT
Start: 2021-10-27 | End: 2022-01-18 | Stop reason: SDUPTHER

## 2021-12-27 ENCOUNTER — PATIENT MESSAGE (OUTPATIENT)
Dept: INTERNAL MEDICINE CLINIC | Age: 71
End: 2021-12-27

## 2021-12-27 NOTE — TELEPHONE ENCOUNTER
From: Emily Brooks  To: Dr. Freddy Brown: 12/27/2021 1:42 PM EST  Subject: Trouble talking and hoarseness. Dear Manda Hannon. I have been on and off very hoarse and having to clear my throat a lot when trying to talk. It seems like there is a lot of drainage down there that needs to be cleared. Or ? ? vocal cord nodule or something. Do you need to see me,or just refer to ENT. Doing fine otherwise and no recent changes. Please let me know,and thanks for everything. Happy Holidays!!

## 2021-12-28 ENCOUNTER — TELEMEDICINE (OUTPATIENT)
Dept: INTERNAL MEDICINE CLINIC | Age: 71
End: 2021-12-28
Payer: MEDICARE

## 2021-12-28 DIAGNOSIS — R49.0 HOARSENESS OF VOICE: Primary | ICD-10-CM

## 2021-12-28 PROCEDURE — 99213 OFFICE O/P EST LOW 20 MIN: CPT | Performed by: INTERNAL MEDICINE

## 2021-12-28 PROCEDURE — 1123F ACP DISCUSS/DSCN MKR DOCD: CPT | Performed by: INTERNAL MEDICINE

## 2021-12-28 PROCEDURE — 4040F PNEUMOC VAC/ADMIN/RCVD: CPT | Performed by: INTERNAL MEDICINE

## 2021-12-28 PROCEDURE — 3017F COLORECTAL CA SCREEN DOC REV: CPT | Performed by: INTERNAL MEDICINE

## 2021-12-28 PROCEDURE — G8427 DOCREV CUR MEDS BY ELIG CLIN: HCPCS | Performed by: INTERNAL MEDICINE

## 2021-12-28 NOTE — PROGRESS NOTES
2021    [facility]) Physicians  Internal Medicine  Patient Encounter  Ora Adams D.O., [address] -- Audio/Visual (During LVBDD-98 public health emergency)      I discussed at this telephone/video visit is a nontraditional type of visit that we are conducting in lieu of an office visit to minimize patient risk during the coronavirus pandemic. I discussed with the patient that I would not be able to perform a full physical examination, but that in most other respects the visit would be beneficial to his/her continued medical care. He/She again gave verbal consent for us to conduct this type of visit. Chief Complaint   Patient presents with    Pharyngitis     1 year on and off, clearing throat often         HPI:    Bro Garrison (:  1950) has requested an audio/video evaluation for the following concern(s): Hoarseness    66 y.o. male being evaluated via virtual video visit due to the coronavirus pandemic emergency and public health crisis and inability to see the patient face-to-face in the office. Seen today complaining of a hoarse voice. Patient states symptoms have been present off and on over the last year but have become more persistent and bothersome over the last 1 to 2 months. Patient states the more he talks the weaker and gravelly his voice becomes. Patient never loses his voice totally. He denies any whispering. He feels he clears his throat a lot. He denies a globus sensation. He denies any cough, odynophagia, dysphagia. He denies any lumps or bumps in the neck. He denies any postnasal drainage, or runny nose, sneezing. His weight has been stable. Appetite is good. He denies any night sweats.       Past Medical History:   Diagnosis Date    Acquired spondylolisthesis of lumbosacral region 2019    Alcoholism ([address]     Anxiety     Basal cell cancer 2010    Dr. Unique Morin Chronic bilateral low back pain without sciatica 2019    DDD (degenerative disc disease), lumbar 2/8/2019    Depression     DVT, lower extremity ([address] 5/29/2015    Right, gastroc, soleal    Hyperlipidemia     Hypertension     Lumbar facet arthropathy 2/15/2019    Lumbar foraminal stenosis 2/15/2019    Osteopenia     Squamous cell skin cancer 2017    right ear lobe    Stasis dermatitis     Varicose veins of both lower extremities     Venous insufficiency of both lower extremities        Medication Sig   metoprolol succinate (TOPROL XL) 50 MG extended release tablet TAKE ONE TABLET BY MOUTH DAILY   clomiPRAMINE (ANAFRANIL) 75 MG capsule TAKE TWO CAPSULES BY MOUTH ONCE NIGHTLY   fenofibrate (TRIGLIDE) 160 MG tablet Take 1 tablet by mouth daily   aspirin 81 MG tablet Take 81 mg by mouth daily         Review of Systems  As per HPI      PHYSICAL EXAMINATION:    Vital Signs: (As obtained by patient/caregiver or practitioner observation)    Patient-Reported Vitals 12/28/2021   Patient-Reported Weight 183   Patient-Reported Height 5'9\"   Patient-Reported Systolic 631   Patient-Reported Diastolic 72   Patient-Reported Pulse 74   Patient-Reported Temperature 97.6         Wt Readings from Last 3 Encounters:   05/18/21 185 lb (83.9 kg)   02/17/21 181 lb 12.8 oz (82.5 kg)   11/16/20 180 lb 6.4 oz (81.8 kg)     BP Readings from Last 3 Encounters:   05/18/21 110/70   02/17/21 130/80   11/16/20 130/72           Physical Exam  Constitutional:       Appearance: Normal appearance. HENT:      Head: Normocephalic and atraumatic. Mouth/Throat:      Comments: Gravelly voice  Eyes:      General: No scleral icterus. Conjunctiva/sclera: Conjunctivae normal.   Neck:      Comments: No visible masses  Pulmonary:      Effort: Pulmonary effort is normal. No respiratory distress. Neurological:      Mental Status: He is alert and oriented to person, place, and time. Psychiatric:         Thought Content:  Thought content normal.           Other pertinent observable physical exam findings-     Due to this being a TeleHealth encounter, evaluation of the following organ systems is limited: Vitals/Constitutional/EENT/Resp/CV/GI//MS/Neuro/Skin/Heme-Lymph-Imm. ASSESSMENT/PLAN:  1. Hoarseness of voice  Etiology is unclear. Needs direct visualization  Referral to ENT  - Stanislaw Gilman MD, Otolaryngology, [facility]    On this date 12/28/2021 I have spent 20 minutes reviewing previous notes, test results and face to face with the patient discussing the diagnosis and importance of compliance with the treatment plan as well as documenting on the day of the visit. No follow-ups on file. An  electronic signature was used to authenticate this note. --Joshua Sanchez DO on 12/28/2021 at 12:33 PM    119}    Pursuant to the emergency declaration under the [address] [place] [address], [facility] waiver authority and the [employer] Act, this Virtual  Visit was conducted, with patient's consent, to reduce the patient's risk of exposure to COVID-19 and provide continuity of care for an established patient. Services were provided through a video synchronous discussion virtually to substitute for in-person clinic visit.

## 2022-01-03 ENCOUNTER — OFFICE VISIT (OUTPATIENT)
Dept: ENT CLINIC | Age: 72
End: 2022-01-03
Payer: MEDICARE

## 2022-01-03 VITALS
HEIGHT: 69 IN | BODY MASS INDEX: 27.85 KG/M2 | HEART RATE: 88 BPM | DIASTOLIC BLOOD PRESSURE: 70 MMHG | WEIGHT: 188 LBS | SYSTOLIC BLOOD PRESSURE: 112 MMHG

## 2022-01-03 DIAGNOSIS — J32.0 LEFT MAXILLARY SINUSITIS: ICD-10-CM

## 2022-01-03 DIAGNOSIS — R49.0 HOARSE VOICE QUALITY: Primary | ICD-10-CM

## 2022-01-03 PROCEDURE — 4040F PNEUMOC VAC/ADMIN/RCVD: CPT | Performed by: OTOLARYNGOLOGY

## 2022-01-03 PROCEDURE — G8482 FLU IMMUNIZE ORDER/ADMIN: HCPCS | Performed by: OTOLARYNGOLOGY

## 2022-01-03 PROCEDURE — 99214 OFFICE O/P EST MOD 30 MIN: CPT | Performed by: OTOLARYNGOLOGY

## 2022-01-03 PROCEDURE — 3017F COLORECTAL CA SCREEN DOC REV: CPT | Performed by: OTOLARYNGOLOGY

## 2022-01-03 PROCEDURE — 1123F ACP DISCUSS/DSCN MKR DOCD: CPT | Performed by: OTOLARYNGOLOGY

## 2022-01-03 PROCEDURE — 1036F TOBACCO NON-USER: CPT | Performed by: OTOLARYNGOLOGY

## 2022-01-03 PROCEDURE — 31575 DIAGNOSTIC LARYNGOSCOPY: CPT | Performed by: OTOLARYNGOLOGY

## 2022-01-03 PROCEDURE — G8417 CALC BMI ABV UP PARAM F/U: HCPCS | Performed by: OTOLARYNGOLOGY

## 2022-01-03 PROCEDURE — G8427 DOCREV CUR MEDS BY ELIG CLIN: HCPCS | Performed by: OTOLARYNGOLOGY

## 2022-01-03 ASSESSMENT — ENCOUNTER SYMPTOMS
EYE ITCHING: 0
SINUS PRESSURE: 0
EYE REDNESS: 0
TROUBLE SWALLOWING: 0
NAUSEA: 0
VOICE CHANGE: 1
EYE PAIN: 0
SHORTNESS OF BREATH: 0
SORE THROAT: 0
SINUS PAIN: 0
FACIAL SWELLING: 0
COUGH: 0
DIARRHEA: 0
CHOKING: 0
RHINORRHEA: 0

## 2022-01-03 NOTE — LETTER
[facility]  [address] [place] [address]  Phone: [phone]  Fax: [phone] Sharon Martinez MD      January 3, 2022     Patient: Marcus Dickens   MR Number: [id number]   YOB: 1950   Date of Visit: 1/3/2022       Dear Dr. Rome Boone:    Thank you for referring Marcus Dickens to me for evaluation/treatment. Below are the relevant portions of my assessment and plan of care. Diagnosis Orders   1. Hoarse voice quality  Culture, Anaerobic and Aerobic   2. Left maxillary sinusitis           Culture obtained. Call with results and plan. Reviewed Dr. Homer Troncoso notes. Reviewed recent chem 7. Follow up in 4 weeks. If you have questions, please do not hesitate to call me. I look forward to following Tran Blevins along with you.     Sincerely,        Jonny Aguilar MD    CC providers:  Estelle Jimenez, [address] [facility] [id number]  [place] [place]

## 2022-01-03 NOTE — PROGRESS NOTES
Subjective:      Patient ID: Khushi Pena is a 66 y.o. male. HPI  Voice Disorders  CC: voice changes  Ana Rosa Cash is a(n) 66 y.o. male who presents with a several week history of voice changes. Raspy. When talking for long periods. Non smoker. Swallowing fine. Denies gross heart burn or dyspepsia. Daily:Yes  Worse in in the evening  Dysphagia:No  Odynophagia:No  Cough:No, but has frequent throat clearing.    Dyspnea: No  Recent cold or flu:No  Reflux disease: No  History of throat or chest surgery:No  The voice is stable  A lack of voice effects my daily life some  Previous episodes:No  Prior voice therapy:No      Patient Active Problem List   Diagnosis    Dyslipidemia    Hypotestosteronism    Anxiety    Osteopenia    ED (erectile dysfunction)    Major depression    Swelling    Varicose veins of both lower extremities    Stasis dermatitis    Venous insufficiency of both lower extremities    DDD (degenerative disc disease), lumbar    Acquired spondylolisthesis of lumbosacral region    Chronic bilateral low back pain without sciatica    Spinal stenosis of lumbar region without neurogenic claudication    Lumbar foraminal stenosis    Lumbar facet arthropathy    Palatal lesion    Benign essential HTN    Orthostatic hypotension    Orthostatic hypotension dysautonomic syndrome    Major depressive disorder with single episode, in full remission [facility])     Past Surgical History:   Procedure Laterality Date    ANESTHESIA NERVE BLOCK Bilateral 3/25/2019    BILATERAL L3,L4,L5 DR MEDIAL BRANCH BLOCKS WITH FLUOROSCOPY performed by Marbin Concepcion MD at [address] [facility]      from [address] [place] [address] Bilateral 3/4/2019    BILATERAL L3, L4, L5 DORSAL RAMUS LUMBAR MEDIAL BRANCH BLOCK WITH FLUOROSCOPY performed by Marbin Concepcion MD at [address] [place] [address] Bilateral 6/3/2019    BILATERAL L3,L4,L5 DORSAL RAMUS RADIOFRQUENCY ABLATION WITH FLUOROSCOPY performed by Kylah Brito Angeles Vieira MD at [address]  02/2019    Uvula lesion, papilloma, Dr. Dena Bahena  8/2010    SKIN CANCER EXCISION Right 2017    [facility]. Family History   Problem Relation Age of Onset    High Blood Pressure Mother     Heart Disease Mother         a fib    Heart Disease Father     Diabetes Father     Heart Attack Father      Social History     Socioeconomic History    Marital status:      Spouse name: Not on file    Number of children: Not on file    Years of education: Not on file    Highest education level: Not on file   Occupational History    Not on file   Tobacco Use    Smoking status: Never Smoker    Smokeless tobacco: Never Used   Substance and Sexual Activity    Alcohol use: No     Comment: Quit 11/3/1992    Drug use: No     Comment: 1992 Narcotic, Benzo, alcohol addition    Sexual activity: Not on file   Other Topics Concern    Not on file   Social History Narrative    Not on file     Social Determinants of Health     Financial Resource Strain: Low Risk     Difficulty of Paying Living Expenses: Not hard at all   Food Insecurity: No Food Insecurity    Worried About Running Out of Food in the Last Year: Never true    Tali of Food in the Last Year: Never true   Transportation Needs: No Transportation Needs    Lack of Transportation (Medical): No    Lack of Transportation (Non-Medical):  No   Physical Activity:     Days of Exercise per Week: Not on file    Minutes of Exercise per Session: Not on file   Stress:     Feeling of Stress : Not on file   Social Connections:     Frequency of Communication with Friends and Family: Not on file    Frequency of Social Gatherings with Friends and Family: Not on file    Attends Sikh Services: Not on file    Active Member of Clubs or Organizations: Not on file    Attends Club or Organization Meetings: Not on file    Marital Status: Not on file   Intimate Partner Violence:     Fear of Current or Ex-Partner: Not on file    Emotionally Abused: Not on file    Physically Abused: Not on file    Sexually Abused: Not on file   Housing Stability: Unknown    Unable to Pay for Housing in the Last Year: No    Number of [place] in the Last Year: Not on file    Unstable Housing in the Last Year: No       DRUG/FOOD ALLERGIES: Patient has no known allergies. CURRENT MEDICATIONS  Prior to Admission medications    Medication Sig Start Date End Date Taking? Authorizing Provider   metoprolol succinate (TOPROL XL) 50 MG extended release tablet TAKE ONE TABLET BY MOUTH DAILY 10/27/21  Yes Keith Valenzuela,    clomiPRAMINE (ANAFRANIL) 75 MG capsule TAKE TWO CAPSULES BY MOUTH ONCE NIGHTLY 9/13/21  Yes Keith Valenzuela DO   fenofibrate (TRIGLIDE) 160 MG tablet Take 1 tablet by mouth daily 8/23/21  Yes Keith Valenzuela DO   aspirin 81 MG tablet Take 81 mg by mouth daily   Yes Historical Provider, MD       Lab Studies:  Lab Results   Component Value Date    WBC 7.2 02/17/2021    HGB 13.5 02/17/2021    HCT 41.4 02/17/2021    .6 (H) 02/17/2021     02/17/2021     Lab Results   Component Value Date    GLUCOSE 93 08/12/2021    BUN 15 08/12/2021    CREATININE 0.9 08/12/2021    K 4.8 08/12/2021     08/12/2021     08/12/2021    CALCIUM 9.0 08/12/2021     No results found for: MG  No results found for: PHOS  Lab Results   Component Value Date    ALKPHOS 60 08/12/2021    ALT 13 08/12/2021    AST 20 08/12/2021    BILITOT 0.3 08/12/2021    PROT 6.5 08/12/2021             Review of Systems   Constitutional: Negative for activity change, appetite change, chills, fatigue and fever. HENT: Positive for voice change. Negative for congestion, ear discharge, ear pain, facial swelling, hearing loss, nosebleeds, postnasal drip, rhinorrhea, sinus pressure, sinus pain, sneezing, sore throat, tinnitus and trouble swallowing. Eyes: Negative for pain, redness, itching and visual disturbance.    Respiratory: Negative for cough, choking and shortness of breath. Gastrointestinal: Negative for diarrhea and nausea. Endocrine: Negative for cold intolerance and heat intolerance. Objective:   Physical Exam  Constitutional:       General: He is not in acute distress. Appearance: He is well-developed. HENT:      Head: Not macrocephalic and not microcephalic. No abrasion or contusion. Mouth/Throat:      Lips: No lesions. Mouth: No oral lesions. Dentition: Normal dentition. Tongue: No lesions. Palate: No mass. Pharynx: No oropharyngeal exudate, posterior oropharyngeal erythema or uvula swelling. Tonsils: No tonsillar abscesses. Neck:      Thyroid: No thyroid mass or thyromegaly. Trachea: No tracheal tenderness or tracheal deviation. Cardiovascular:      Rate and Rhythm: Normal rate and regular rhythm. Pulmonary:      Breath sounds: No stridor. Musculoskeletal:      Cervical back: Normal range of motion and neck supple. No edema or erythema. No muscular tenderness. Lymphadenopathy:      Head:      Right side of head: No submental, submandibular, tonsillar, preauricular, posterior auricular or occipital adenopathy. Left side of head: No submental, submandibular, tonsillar, preauricular or occipital adenopathy. Cervical: No cervical adenopathy. Right cervical: No superficial, deep or posterior cervical adenopathy. Left cervical: No superficial, deep or posterior cervical adenopathy. Skin:     General: Skin is warm and dry. Findings: No lesion. Neurological:      Mental Status: He is alert and oriented to person, place, and time. Psychiatric:         Mood and Affect: Mood is not anxious or depressed.          Due to the patients chronic benign and/or malignant laryngeal disease and/or history of laryngeal neoplasm for surveillance a flexible laryngoscopy will be performed to complete a significant physical examination of the patient which cannot be performed by indirect mirror (ie. Gag or incomplete visualization, see physical exam notes). Failure to provide this procedure may lead to late detection of significant chronic benign disease, acute exacerbation, resolution or failure of early diagnosis of recurrent cancer. The procedure report is present in the body of the chart. Flexible Laryngoscopy    Pre op: hoarse voice quality. Post op: Left maxillary sinusitis. Mild pachydermia  Procedure : FlexibleLaryngoscopy  Surgeon: Maris Pichardo  Anesthesia: Afrin with 2% lidocaine  Estimated Blood Loss: None    Procedure:   Flexible Laryngoscopy     After obtaining consent, the patient was placed in the examination chair in the upright position. Decongestant and topical anesthetic was sprayed in the After allowing adequate time for hemostatic effect, the flexible 4 mm laryngoscope was passed via the     Nasal Septum: normal;   Nasal Findings: purulence left maxillary sinus. Cultured. ;   Nasopharynx: normal   Eustachian Tube: normal   Oropharynx: normal   Base of Tongue: normal   Epiglottis: normal   True Vocal Cord normal   False Vocal Cord: normal   True Vocal Cord Movement: normal   Hypopharynx Mucosa: normal  Arytenoids: normal     * Patient tolerated the procedure well with no complications   * Patient was instructed not to eat for 30 minutes following procedure. * Patient was instructed that they may notice minor bleeding. Attestation:   I was present for the entire viewing, including introduction and removal of the scope. Assessment:       Diagnosis Orders   1. Hoarse voice quality  Culture, Anaerobic and Aerobic   2. Left maxillary sinusitis             Plan:      Culture obtained. Call with results and plan. Reviewed Dr. Nancy Pina notes. Reviewed recent chem 7. Follow up in 4 weeks.          Ashely Godwin MD

## 2022-01-07 ENCOUNTER — TELEPHONE (OUTPATIENT)
Dept: ENT CLINIC | Age: 72
End: 2022-01-07

## 2022-01-07 DIAGNOSIS — J32.0 CHRONIC LEFT MAXILLARY SINUSITIS: Primary | ICD-10-CM

## 2022-01-07 LAB
GRAM STAIN RESULT: ABNORMAL
ORGANISM: ABNORMAL
ORGANISM: ABNORMAL
WOUND/ABSCESS: ABNORMAL
WOUND/ABSCESS: ABNORMAL

## 2022-01-07 RX ORDER — SULFAMETHOXAZOLE AND TRIMETHOPRIM 400; 80 MG/1; MG/1
1 TABLET ORAL 2 TIMES DAILY
Qty: 20 TABLET | Refills: 0 | Status: SHIPPED | OUTPATIENT
Start: 2022-01-07 | End: 2022-01-19 | Stop reason: SDUPTHER

## 2022-01-07 NOTE — TELEPHONE ENCOUNTER
----- Message from Daniel Ayala MD sent at 1/7/2022  3:57 PM EST -----  Please let Mr. Elba Quintero know his culture grew staph. I sent him in an antibiotic to treat this. I should see him back in the office in two weeks.

## 2022-01-18 ENCOUNTER — TELEPHONE (OUTPATIENT)
Dept: ENT CLINIC | Age: 72
End: 2022-01-18

## 2022-01-18 DIAGNOSIS — R00.0 RACING HEART BEAT: ICD-10-CM

## 2022-01-18 RX ORDER — METOPROLOL SUCCINATE 25 MG/1
25 TABLET, EXTENDED RELEASE ORAL DAILY
Qty: 90 TABLET | Refills: 3 | Status: SHIPPED | OUTPATIENT
Start: 2022-01-18 | End: 2022-10-18

## 2022-01-18 NOTE — TELEPHONE ENCOUNTER
Patient is only taking 25mg and would like that dose called in instead of the 50 so he does not have to cut in half.   Thank you

## 2022-01-18 NOTE — TELEPHONE ENCOUNTER
Patient called to say he is better but still not 100% he wants to know if Dr Geno Bhagat wants to do another round of RX or just wait until next appt please call and let him know he still has some clearing of the throat and drainage thank you

## 2022-01-19 DIAGNOSIS — J32.0 CHRONIC LEFT MAXILLARY SINUSITIS: ICD-10-CM

## 2022-01-19 RX ORDER — SULFAMETHOXAZOLE AND TRIMETHOPRIM 400; 80 MG/1; MG/1
1 TABLET ORAL 2 TIMES DAILY
Qty: 20 TABLET | Refills: 0 | Status: SHIPPED | OUTPATIENT
Start: 2022-01-19 | End: 2022-01-29

## 2022-02-07 ENCOUNTER — OFFICE VISIT (OUTPATIENT)
Dept: ENT CLINIC | Age: 72
End: 2022-02-07
Payer: MEDICARE

## 2022-02-07 ENCOUNTER — TELEPHONE (OUTPATIENT)
Dept: ENT CLINIC | Age: 72
End: 2022-02-07

## 2022-02-07 VITALS
DIASTOLIC BLOOD PRESSURE: 77 MMHG | WEIGHT: 188 LBS | TEMPERATURE: 97.1 F | HEIGHT: 69 IN | SYSTOLIC BLOOD PRESSURE: 127 MMHG | HEART RATE: 80 BPM | BODY MASS INDEX: 27.85 KG/M2

## 2022-02-07 DIAGNOSIS — J32.0 LEFT MAXILLARY SINUSITIS: Primary | ICD-10-CM

## 2022-02-07 PROCEDURE — 4040F PNEUMOC VAC/ADMIN/RCVD: CPT | Performed by: OTOLARYNGOLOGY

## 2022-02-07 PROCEDURE — 31231 NASAL ENDOSCOPY DX: CPT | Performed by: OTOLARYNGOLOGY

## 2022-02-07 PROCEDURE — 1036F TOBACCO NON-USER: CPT | Performed by: OTOLARYNGOLOGY

## 2022-02-07 PROCEDURE — G8427 DOCREV CUR MEDS BY ELIG CLIN: HCPCS | Performed by: OTOLARYNGOLOGY

## 2022-02-07 PROCEDURE — 1123F ACP DISCUSS/DSCN MKR DOCD: CPT | Performed by: OTOLARYNGOLOGY

## 2022-02-07 PROCEDURE — G8482 FLU IMMUNIZE ORDER/ADMIN: HCPCS | Performed by: OTOLARYNGOLOGY

## 2022-02-07 PROCEDURE — 3017F COLORECTAL CA SCREEN DOC REV: CPT | Performed by: OTOLARYNGOLOGY

## 2022-02-07 PROCEDURE — G8417 CALC BMI ABV UP PARAM F/U: HCPCS | Performed by: OTOLARYNGOLOGY

## 2022-02-07 NOTE — TELEPHONE ENCOUNTER
Hourly rounding performed at this time. Patient is in bed awake and alert. Pain,is the same. Assessed for need of repositioning and given opportunity for toileting. Discussed care plan, patient denies any additional needs at this time and has no complaints or questions. Room assessed for safety measures and cleanliness, no action needed at this time. The bed is in low, locked position with side rails up x 2. Call light is in reach, and patient is oriented to call light use. Patient verbalizes will call nurse if assistance is needed.       Please call patient the CT and f/u appt that where made today will Not work .  He needs to have this reschedule  He will wait for our call  Thank you

## 2022-02-07 NOTE — PROGRESS NOTES
FLUOROSCOPY performed by Burton Berg MD at [address] Bilateral 6/3/2019    BILATERAL L3,L4,L5 DORSAL RAMUS RADIOFRQUENCY ABLATION WITH FLUOROSCOPY performed by Burton Berg MD at [address]  02/2019    Uvula lesion, papilloma, Dr. Jon Ramirez  8/2010    SKIN CANCER EXCISION Right 2017    [facility]. Family History   Problem Relation Age of Onset    High Blood Pressure Mother     Heart Disease Mother         a fib    Heart Disease Father     Diabetes Father     Heart Attack Father      Social History     Socioeconomic History    Marital status:      Spouse name: Not on file    Number of children: Not on file    Years of education: Not on file    Highest education level: Not on file   Occupational History    Not on file   Tobacco Use    Smoking status: Never Smoker    Smokeless tobacco: Never Used   Substance and Sexual Activity    Alcohol use: No     Comment: Quit 11/3/1992    Drug use: No     Comment: 1992 Narcotic, Benzo, alcohol addition    Sexual activity: Not on file   Other Topics Concern    Not on file   Social History Narrative    Not on file     Social Determinants of Health     Financial Resource Strain: Low Risk     Difficulty of Paying Living Expenses: Not hard at all   Food Insecurity: No Food Insecurity    Worried About Running Out of Food in the Last Year: Never true    Tali of Food in the Last Year: Never true   Transportation Needs: No Transportation Needs    Lack of Transportation (Medical): No    Lack of Transportation (Non-Medical):  No   Physical Activity:     Days of Exercise per Week: Not on file    Minutes of Exercise per Session: Not on file   Stress:     Feeling of Stress : Not on file   Social Connections:     Frequency of Communication with Friends and Family: Not on file    Frequency of Social Gatherings with Friends and Family: Not on file    Attends Taoism Services: Not on file   [facility] [employer] or Organizations: Not on file    Attends Club or Organization Meetings: Not on file    Marital Status: Not on file   Intimate Partner Violence:     Fear of Current or Ex-Partner: Not on file    Emotionally Abused: Not on file    Physically Abused: Not on file    Sexually Abused: Not on file   Housing Stability: Unknown    Unable to Pay for Housing in the Last Year: No    Number of [place] in the Last Year: Not on file    Unstable Housing in the Last Year: No       DRUG/FOOD ALLERGIES: Patient has no known allergies. CURRENT MEDICATIONS  Prior to Admission medications    Medication Sig Start Date End Date Taking? Authorizing Provider   metoprolol succinate (TOPROL XL) 50 MG extended release tablet TAKE ONE TABLET BY MOUTH DAILY 10/27/21  Yes Keith Valenzuela, DO   clomiPRAMINE (ANAFRANIL) 75 MG capsule TAKE TWO CAPSULES BY MOUTH ONCE NIGHTLY 9/13/21  Yes Keith Valenzuela DO   fenofibrate (TRIGLIDE) 160 MG tablet Take 1 tablet by mouth daily 8/23/21  Yes Keith Valenzuela DO   aspirin 81 MG tablet Take 81 mg by mouth daily   Yes Historical Provider, MD       Lab Studies:  Lab Results   Component Value Date    WBC 7.2 02/17/2021    HGB 13.5 02/17/2021    HCT 41.4 02/17/2021    .6 (H) 02/17/2021     02/17/2021     Lab Results   Component Value Date    GLUCOSE 93 08/12/2021    BUN 15 08/12/2021    CREATININE 0.9 08/12/2021    K 4.8 08/12/2021     08/12/2021     08/12/2021    CALCIUM 9.0 08/12/2021     No results found for: MG  No results found for: PHOS  Lab Results   Component Value Date    ALKPHOS 60 08/12/2021    ALT 13 08/12/2021    AST 20 08/12/2021    BILITOT 0.3 08/12/2021    PROT 6.5 08/12/2021             Review of Systems   Constitutional: Negative for activity change, appetite change, chills, fatigue and fever. HENT: Positive for voice change.  Negative for congestion, ear discharge, ear pain, facial swelling, hearing loss, nosebleeds, postnasal drip, rhinorrhea, sinus pressure, sinus pain, sneezing, sore throat, tinnitus and trouble swallowing. Eyes: Negative for pain, redness, itching and visual disturbance. Respiratory: Negative for cough, choking and shortness of breath. Gastrointestinal: Negative for diarrhea and nausea. Endocrine: Negative for cold intolerance and heat intolerance. Objective:   Physical Exam  Constitutional:       General: He is not in acute distress. Appearance: He is well-developed. HENT:      Head: Not macrocephalic and not microcephalic. No abrasion or contusion. Mouth/Throat:      Lips: No lesions. Mouth: No oral lesions. Dentition: Normal dentition. Tongue: No lesions. Palate: No mass. Pharynx: No oropharyngeal exudate, posterior oropharyngeal erythema or uvula swelling. Tonsils: No tonsillar abscesses. Neck:      Thyroid: No thyroid mass or thyromegaly. Trachea: No tracheal tenderness or tracheal deviation. Cardiovascular:      Rate and Rhythm: Normal rate and regular rhythm. Pulmonary:      Breath sounds: No stridor. Musculoskeletal:      Cervical back: Normal range of motion and neck supple. No edema or erythema. No muscular tenderness. Lymphadenopathy:      Head:      Right side of head: No submental, submandibular, tonsillar, preauricular, posterior auricular or occipital adenopathy. Left side of head: No submental, submandibular, tonsillar, preauricular or occipital adenopathy. Cervical: No cervical adenopathy. Right cervical: No superficial, deep or posterior cervical adenopathy. Left cervical: No superficial, deep or posterior cervical adenopathy. Skin:     General: Skin is warm and dry. Findings: No lesion. Neurological:      Mental Status: He is alert and oriented to person, place, and time. Psychiatric:         Mood and Affect: Mood is not anxious or depressed.              Due to the patients chronic sinus disease and/or history of sinonasal neoplasm for surveillance a nasal endoscopy with or without debridement will be performed to complete a significant physical examination of the patient which cannot be performed by anterior rhinoscopy alone (failure of complete examination of the paranasal sinuses). Failure to provide this procedure may lead to late detection of significant chronic benign disease, acute exacerbation, resolution or failure of early diagnosis of recurrent cancer. The procedure report is present in the body of the chart. Nasal Endoscopy    Pre OP: left maxillary sinusitis  Post OP: Same  Reason: Persistent symptoms despite maximal medical therapy. Procedure: Nasal endoscopy  Surgeon: Juanita Masterson  Anesthesia: Afrin with 2% lidocaine  Estimated Blood Loss: None      After obtaining verbal consent from the patient 1% lidocaine with afrin was sprayed into the nasal cavities. After allowing a time for anesthesia, a nasal endoscope was placed into the nostril. The septum, inferior, and middle turbinates were examined. The middle meatus, and sphenoethmoid recess was examined bilaterally. Cultures were not obtained from the sinuses. There were no complications. Pertinent positives included: There was edema and purulence in the left middle meatus. There was not edema and purulence at the right middle meatus. Polyps were not identified in the sinuses. Masses were not identified. Tolerated well without complication. I attest that I was present for and did the entire procedure myself. Assessment:       Diagnosis Orders   1. Left maxillary sinusitis  CT SINUS WO CONTRAST             Plan:      Persistent edema and purulence left middle metus. Ordered CT sinus. Likely will need outpatient FESS for persistent disease. Follow up after imaging.      Total time spent with the patient reviewing charts, lab tests, images, outside medical visits, history, examination, answering  questions and formulating treatment plan was 14 minutes.   New 2 - 15 Return 2 - 10  New 3 - 30 Return 3 - 20  New 4 - 45 Return 4 - 30  New 5 - 60 Return 5 - 40+          Darya Molina MD

## 2022-02-16 ENCOUNTER — HOSPITAL ENCOUNTER (OUTPATIENT)
Dept: CT IMAGING | Age: 72
Discharge: HOME | End: 2022-02-16
Payer: MEDICARE

## 2022-02-16 DIAGNOSIS — J32.0 LEFT MAXILLARY SINUSITIS: ICD-10-CM

## 2022-02-16 PROCEDURE — 70486 CT MAXILLOFACIAL W/O DYE: CPT

## 2022-02-19 SDOH — HEALTH STABILITY: PHYSICAL HEALTH: ON AVERAGE, HOW MANY DAYS PER WEEK DO YOU ENGAGE IN MODERATE TO STRENUOUS EXERCISE (LIKE A BRISK WALK)?: 3 DAYS

## 2022-02-19 SDOH — HEALTH STABILITY: PHYSICAL HEALTH: ON AVERAGE, HOW MANY MINUTES DO YOU ENGAGE IN EXERCISE AT THIS LEVEL?: 60 MIN

## 2022-02-19 ASSESSMENT — LIFESTYLE VARIABLES
HOW OFTEN DO YOU HAVE A DRINK CONTAINING ALCOHOL: NEVER
HOW MANY STANDARD DRINKS CONTAINING ALCOHOL DO YOU HAVE ON A TYPICAL DAY: 98
HOW OFTEN DO YOU HAVE A DRINK CONTAINING ALCOHOL: 1
HOW OFTEN DO YOU HAVE SIX OR MORE DRINKS ON ONE OCCASION: 1
HOW MANY STANDARD DRINKS CONTAINING ALCOHOL DO YOU HAVE ON A TYPICAL DAY: PATIENT DECLINED

## 2022-02-19 ASSESSMENT — PATIENT HEALTH QUESTIONNAIRE - PHQ9
SUM OF ALL RESPONSES TO PHQ QUESTIONS 1-9: 0
2. FEELING DOWN, DEPRESSED OR HOPELESS: 0
SUM OF ALL RESPONSES TO PHQ QUESTIONS 1-9: 0
1. LITTLE INTEREST OR PLEASURE IN DOING THINGS: 0
SUM OF ALL RESPONSES TO PHQ QUESTIONS 1-9: 0
SUM OF ALL RESPONSES TO PHQ9 QUESTIONS 1 & 2: 0
SUM OF ALL RESPONSES TO PHQ QUESTIONS 1-9: 0

## 2022-02-21 ENCOUNTER — TELEPHONE (OUTPATIENT)
Dept: ENT CLINIC | Age: 72
End: 2022-02-21

## 2022-02-21 ENCOUNTER — OFFICE VISIT (OUTPATIENT)
Dept: INTERNAL MEDICINE CLINIC | Age: 72
End: 2022-02-21
Payer: MEDICARE

## 2022-02-21 ENCOUNTER — OFFICE VISIT (OUTPATIENT)
Dept: ENT CLINIC | Age: 72
End: 2022-02-21
Payer: MEDICARE

## 2022-02-21 VITALS
RESPIRATION RATE: 14 BRPM | SYSTOLIC BLOOD PRESSURE: 112 MMHG | WEIGHT: 185.6 LBS | DIASTOLIC BLOOD PRESSURE: 66 MMHG | HEART RATE: 85 BPM | HEIGHT: 69 IN | OXYGEN SATURATION: 96 % | BODY MASS INDEX: 27.49 KG/M2

## 2022-02-21 VITALS
WEIGHT: 185 LBS | TEMPERATURE: 97 F | SYSTOLIC BLOOD PRESSURE: 112 MMHG | HEART RATE: 85 BPM | HEIGHT: 69 IN | DIASTOLIC BLOOD PRESSURE: 66 MMHG | BODY MASS INDEX: 27.4 KG/M2

## 2022-02-21 DIAGNOSIS — I95.1 ORTHOSTATIC HYPOTENSION DYSAUTONOMIC SYNDROME: ICD-10-CM

## 2022-02-21 DIAGNOSIS — Z13.1 SCREENING FOR DIABETES MELLITUS: ICD-10-CM

## 2022-02-21 DIAGNOSIS — R73.01 IMPAIRED FASTING GLUCOSE: ICD-10-CM

## 2022-02-21 DIAGNOSIS — J32.0 CHRONIC MAXILLARY SINUSITIS: ICD-10-CM

## 2022-02-21 DIAGNOSIS — Z00.00 MEDICARE ANNUAL WELLNESS VISIT, SUBSEQUENT: Primary | ICD-10-CM

## 2022-02-21 DIAGNOSIS — F19.11 HISTORY OF SUBSTANCE ABUSE (HCC): ICD-10-CM

## 2022-02-21 DIAGNOSIS — R49.0 HOARSENESS OF VOICE: ICD-10-CM

## 2022-02-21 DIAGNOSIS — Z12.5 SCREENING FOR PROSTATE CANCER: ICD-10-CM

## 2022-02-21 DIAGNOSIS — J32.0 CHRONIC LEFT MAXILLARY SINUSITIS: ICD-10-CM

## 2022-02-21 DIAGNOSIS — E78.5 DYSLIPIDEMIA: ICD-10-CM

## 2022-02-21 DIAGNOSIS — J32.0 LEFT MAXILLARY SINUSITIS: Primary | ICD-10-CM

## 2022-02-21 DIAGNOSIS — Z13.6 SCREENING FOR CARDIOVASCULAR CONDITION: ICD-10-CM

## 2022-02-21 DIAGNOSIS — F32.5 MAJOR DEPRESSIVE DISORDER WITH SINGLE EPISODE, IN FULL REMISSION (HCC): ICD-10-CM

## 2022-02-21 PROCEDURE — 99214 OFFICE O/P EST MOD 30 MIN: CPT | Performed by: OTOLARYNGOLOGY

## 2022-02-21 PROCEDURE — 1123F ACP DISCUSS/DSCN MKR DOCD: CPT | Performed by: INTERNAL MEDICINE

## 2022-02-21 PROCEDURE — 3017F COLORECTAL CA SCREEN DOC REV: CPT | Performed by: INTERNAL MEDICINE

## 2022-02-21 PROCEDURE — 1036F TOBACCO NON-USER: CPT | Performed by: OTOLARYNGOLOGY

## 2022-02-21 PROCEDURE — 4040F PNEUMOC VAC/ADMIN/RCVD: CPT | Performed by: OTOLARYNGOLOGY

## 2022-02-21 PROCEDURE — G8417 CALC BMI ABV UP PARAM F/U: HCPCS | Performed by: OTOLARYNGOLOGY

## 2022-02-21 PROCEDURE — G0439 PPPS, SUBSEQ VISIT: HCPCS | Performed by: INTERNAL MEDICINE

## 2022-02-21 PROCEDURE — 1123F ACP DISCUSS/DSCN MKR DOCD: CPT | Performed by: OTOLARYNGOLOGY

## 2022-02-21 PROCEDURE — G0446 INTENS BEHAVE THER CARDIO DX: HCPCS | Performed by: INTERNAL MEDICINE

## 2022-02-21 PROCEDURE — 3017F COLORECTAL CA SCREEN DOC REV: CPT | Performed by: OTOLARYNGOLOGY

## 2022-02-21 PROCEDURE — G8427 DOCREV CUR MEDS BY ELIG CLIN: HCPCS | Performed by: OTOLARYNGOLOGY

## 2022-02-21 PROCEDURE — G8482 FLU IMMUNIZE ORDER/ADMIN: HCPCS | Performed by: OTOLARYNGOLOGY

## 2022-02-21 PROCEDURE — 4040F PNEUMOC VAC/ADMIN/RCVD: CPT | Performed by: INTERNAL MEDICINE

## 2022-02-21 PROCEDURE — G8482 FLU IMMUNIZE ORDER/ADMIN: HCPCS | Performed by: INTERNAL MEDICINE

## 2022-02-21 ASSESSMENT — PATIENT HEALTH QUESTIONNAIRE - PHQ9
SUM OF ALL RESPONSES TO PHQ QUESTIONS 1-9: 0
1. LITTLE INTEREST OR PLEASURE IN DOING THINGS: 0
SUM OF ALL RESPONSES TO PHQ QUESTIONS 1-9: 0
2. FEELING DOWN, DEPRESSED OR HOPELESS: 0
SUM OF ALL RESPONSES TO PHQ9 QUESTIONS 1 & 2: 0

## 2022-02-21 NOTE — PROGRESS NOTES
[facility]) Physicians  Internal Medicine  Patient Encounter  Estela Hobson D.O., [facility] Annual Wellness Visit    Madison Remy is here for [employer] [facility]            Chief Complaint   Patient presents with   [facility]         Medical/Surgical Histories     Past Medical History:   Diagnosis Date    Acquired spondylolisthesis of lumbosacral region 2/8/2019    Alcoholism ([facility] [address]     Anxiety     Basal cell cancer 8/2010    Dr. Renzo Medina Chronic bilateral low back pain without sciatica 2/8/2019    DDD (degenerative disc disease), lumbar 2/8/2019    Depression     DVT, lower extremity ([address] 5/29/2015    Right, gastroc, soleal    Hyperlipidemia     Hypertension     Lumbar facet arthropathy 2/15/2019    Lumbar foraminal stenosis 2/15/2019    Osteopenia     Squamous cell skin cancer 2017    right ear lobe    Stasis dermatitis     Varicose veins of both lower extremities     Venous insufficiency of both lower extremities           Past Surgical History:   Procedure Laterality Date    ANESTHESIA NERVE BLOCK Bilateral 3/25/2019    BILATERAL L3,L4,L5 DR MEDIAL BRANCH BLOCKS WITH FLUOROSCOPY performed by Romelia Braga MD at [address] [facility]      from [facility] Bilateral 3/4/2019    BILATERAL L3, L4, L5 DORSAL RAMUS LUMBAR MEDIAL BRANCH BLOCK WITH FLUOROSCOPY performed by Romleia Braga MD at [facility] Bilateral 6/3/2019    BILATERAL L3,L4,L5 DORSAL RAMUS RADIOFRQUENCY ABLATION WITH FLUOROSCOPY performed by Romelia Braga MD at [address]  02/2019    Uvula lesion, papilloma, Dr. Bob Boss  8/2010    SKIN CANCER EXCISION Right 2017    Debra Cohen.              Medications/Allergies     Current Outpatient Medications   Medication Sig Dispense Refill    metoprolol succinate (TOPROL XL) 25 MG extended release tablet Take 1 tablet by mouth daily 90 tablet 3    clomiPRAMINE (ANAFRANIL) 75 MG capsule TAKE TWO CAPSULES BY MOUTH ONCE NIGHTLY 180 capsule 3    fenofibrate (TRIGLIDE) 160 MG tablet Take 1 tablet by mouth daily 90 tablet 3     No current facility-administered medications for this visit. No Known Allergies      Substance Use History     Social History     Tobacco Use    Smoking status: Never Smoker    Smokeless tobacco: Never Used   Substance Use Topics    Alcohol use: No     Comment: Quit 11/3/1992    Drug use: No     Comment: 1992 Narcotic, Benzo, alcohol addition          Family History     Family History   Problem Relation Age of Onset    High Blood Pressure Mother     Heart Disease Mother         a fib    Heart Disease Father     Diabetes Father     Heart Attack Father            Patient's complete Health Risk Assessment and screening values have been reviewed and are found in Flowsheets. The following problems were reviewed today and where indicated follow up appointments were made and/or referrals ordered. Positive Risk Factor Screenings with Interventions:        Alcohol Screening:       A score of 8 or more is associated with harmful or hazardous drinking. A score of 13 or more in women, and 15 or more in men, is likely to indicate alcohol dependence. Substance Abuse - Alcohol Interventions:  Pt is 29 years sober. He still goes to [facility]. He does not go to . He also meets with other medical providers with H/O substance use history. CareTeam (Including outside providers/suppliers regularly involved in providing care):   Patient Care Team:  Luci Miranda DO as PCP - [address] [place] [address] as PCP - [facility]  Colleen Brown MD (Vascular Surgery)  Talia Pitts MD as Consulting Physician (Pain Management)  Sadie Munoz as Consulting Physician (Dermatology)  Earl Covarrubias MD as Consulting Physician (Gastroenterology)  Hira Hollingsworth DO (Optometry)                  ROS:  HEENT:  Hoarseness. Saw ENT.   Had scope 1/3/2022. Purulence from the left maxillary sinus that grew staph. Treated with 2 rounds of Bactrim. He had another scope 2/7/2022 continuing to show purulent drainage from maxillary sinus. CT scan sinus 2/16/2022--reviewed. Dr. Sujit Hernandez suggested sinus surgery. He has improved. He still has hoarseness. Ct scan 2/16/2021-- Complete opacification of the left maxillary sinus with mild inflammation noted in the left frontal sinus and ethmoid air cells. Saw Ophth. Saw Audiology for hearing aides. Wearing a bite guard for snoring, from dentist.  Wife says he is not snoring. SKIN: Saw Derm ([facility]). Saw NP. Back lesion. Lesion was benign, but did have some dysplasia. CV:  Lightheadedness is better. If he sits for a long period and gets up too fast he may have transient lightheadedness. He saw Dr. Kristy Beckett. Physical Exam  Vitals:    02/21/22 0753   BP: 112/66   Pulse: 85   Resp: 14   SpO2: 96%   Weight: 185 lb 9.6 oz (84.2 kg)   Height: 5' 9\" (1.753 m)     Body mass index is 27.41 kg/m². Wt Readings from Last 3 Encounters:   02/21/22 185 lb 9.6 oz (84.2 kg)   02/07/22 188 lb (85.3 kg)   01/03/22 188 lb (85.3 kg)     BP Readings from Last 3 Encounters:   02/21/22 112/66   02/07/22 127/77   01/03/22 112/70      GEN:  66 y.o. male who is in NAD, A&O. He appears stated age and well nourished. He is cooperative and pleasant. Voice is occasionally gravelly  HEAD:  NC/AT, no lesions. NECK:  Supple. Full ROM. Trachea is midline. No increased JVD. No thyromegaly or nodules. No masses  LYMPH: No C/SC/A/F nodes  CARDIAC:  S1S2 NL. Regular rhythm. No murmur/clicks/rubs.  + Occasional ectopy. VASC:  Pedal pulses 2/4. Carotid upstrokes 2+. No bruits noted.  + Bilateral lower extremity varicose veins, dilated venules, spider veins. PULM:  Lungs are CTA. Symmetric breath sounds noted. AP Diameter NL. GI:  Abdomen is soft and nontender. No distension.   No organomegaly. No masses. No pulsatile masses. EXT:  No Cyanosis or clubbing. No edema. SKIN: Warm and dry, normal turgor, no rash or lesions of concern. NEURO: No focal or lateralizing deficits  PSYCH:  Mood and affect NL. Judgement and insight NL.         ASSESSMENT/PLAN:    1. Medicare annual wellness visit, subsequent  All care gaps identified and addressed  Colonoscopy next year  - HI Intens behave ther cardio dx, 15 minutes []  - CBC with Auto Differential; Future  - Comprehensive Metabolic Panel; Future  - Lipid Panel; Future  - Hemoglobin A1C; Future  - PSA Screening; Future  - TSH; Future    2. History of substance abuse ([facility] [address]  Patient is nearly 30 years sober. Would be cautious about any type of postoperative pain medication    3. Major depressive disorder with single episode, in full remission [facility])  Well-controlled  Patient had significant exacerbation when trying to come off the clomipramine. 4. Screening for cardiovascular condition    - HI Intens behave ther cardio dx, 15 minutes []    5. Orthostatic hypotension dysautonomic syndrome  Much improved. This is likely due to adverse drug effect    6. Screening for prostate cancer    - PSA Screening; Future    7. Impaired fasting glucose    - Comprehensive Metabolic Panel; Future  - Hemoglobin A1C; Future    8. Screening for diabetes mellitus    - Comprehensive Metabolic Panel; Future  - Hemoglobin A1C; Future    9. Chronic maxillary sinusitis  Under the care of ENT  Patient likely needs endoscopic sinus surgery    10. Dyslipidemia    - Comprehensive Metabolic Panel; Future  - Lipid Panel; Future  - TSH; Future     11.   Hoarseness of voice  Etiology likely due to postnasal drainage  Per ENT      Recommendations for Preventive Services Due: see orders and patient instructions/AVS.  Recommended screening schedule for the next 5-10 years is provided to the patient in written form: see Patient Instructions/AVS.    Cardiovascular Disease Risk Counseling: Assessed the patient's risk to develop cardiovascular disease and reviewed main risk factors. Reviewed steps to reduce disease risk including:   · Quitting tobacco use, reducing amount smoked, or not starting the habit  · Making healthy food choices  · Being physically active and gradualy increasing activity levels   · Reduce weight and determine a healthy BMI goal  · Monitor blood pressure and treat if higher than 140/90 mmHg  · Maintain blood total cholesterol levels under 5 mmol/l or 190 mg/dl  · Maintain LDL cholesterol levels under 3.0 mmol/l or 115 mg/dl   · Control blood glucose levels  · Consider taking aspirin (75 mg daily), once blood pressure is controlled   Provided a follow up plan.   Time spent (minutes): 15

## 2022-02-21 NOTE — PROGRESS NOTES
Subjective:      Patient ID: Kirsty Ferreira is a 66 y.o. male. HPI  Voice Disorders  CC: voice changes  Melvina De Dios is a(n) 66 y.o. male who presents with a several week history of voice changes. Raspy. When talking for long periods. Non smoker. Swallowing fine. Denies gross heart burn or dyspepsia. Daily:Yes  Worse in in the evening  Dysphagia:No  Odynophagia:No  Cough:No, but has frequent throat clearing. Dyspnea: No  Recent cold or flu:No  Reflux disease: No  History of throat or chest surgery:No  The voice is stable  A lack of voice effects my daily life some  Previous episodes:No  Prior voice therapy:No    Patient had purulence from left maxillary sinus. Cultured. Staph. Treated with antibiotics.  Patient as compltered second round of extended antibiotics with persistent complete opacification of left maxillary sinus and anterior ethmoid      Patient Active Problem List   Diagnosis    Dyslipidemia    Hypotestosteronism    Anxiety    Osteopenia    ED (erectile dysfunction)    Major depression    Swelling    Varicose veins of both lower extremities    Stasis dermatitis    Venous insufficiency of both lower extremities    DDD (degenerative disc disease), lumbar    Acquired spondylolisthesis of lumbosacral region    Chronic bilateral low back pain without sciatica    Spinal stenosis of lumbar region without neurogenic claudication    Lumbar foraminal stenosis    Lumbar facet arthropathy    Palatal lesion    Benign essential HTN    Orthostatic hypotension    Orthostatic hypotension dysautonomic syndrome    Major depressive disorder with single episode, in full remission [facility])     Past Surgical History:   Procedure Laterality Date    ANESTHESIA NERVE BLOCK Bilateral 3/25/2019    BILATERAL L3,L4,L5 DR MEDIAL BRANCH BLOCKS WITH FLUOROSCOPY performed by Rodo Cazares MD at [address]      from [address] [place] [address] Bilateral 3/4/2019    BILATERAL L3, L4, L5 DORSAL RAMUS LUMBAR MEDIAL BRANCH BLOCK WITH FLUOROSCOPY performed by Talia Pitts MD at [address] Bilateral 6/3/2019    BILATERAL L3,L4,L5 DORSAL RAMUS RADIOFRQUENCY ABLATION WITH FLUOROSCOPY performed by Talia Pitts MD at [address]  02/2019    Uvula lesion, papilloma, Dr. Amarilis Amezquita  8/2010    SKIN CANCER EXCISION Right 2017    [facility]. Family History   Problem Relation Age of Onset    High Blood Pressure Mother     Heart Disease Mother         a fib    Heart Disease Father     Diabetes Father     Heart Attack Father      Social History     Socioeconomic History    Marital status:      Spouse name: Not on file    Number of children: Not on file    Years of education: Not on file    Highest education level: Not on file   Occupational History    Not on file   Tobacco Use    Smoking status: Never Smoker    Smokeless tobacco: Never Used   Substance and Sexual Activity    Alcohol use: No     Comment: Quit 11/3/1992    Drug use: No     Comment: 1992 Narcotic, Benzo, alcohol addition    Sexual activity: Not on file   Other Topics Concern    Not on file   Social History Narrative    Not on file     Social Determinants of Health     Financial Resource Strain: Low Risk     Difficulty of Paying Living Expenses: Not hard at all   Food Insecurity: No Food Insecurity    Worried About Running Out of Food in the Last Year: Never true    Tali of Food in the Last Year: Never true   Transportation Needs: No Transportation Needs    Lack of Transportation (Medical): No    Lack of Transportation (Non-Medical):  No   Physical Activity:     Days of Exercise per Week: Not on file    Minutes of Exercise per Session: Not on file   Stress:     Feeling of Stress : Not on file   Social Connections:     Frequency of Communication with Friends and Family: Not on file    Frequency of Social Gatherings with Friends and Family: Not on file    Attends [employer] Services: Not on file    Active Member of Clubs or Organizations: Not on file    Attends Club or Organization Meetings: Not on file    Marital Status: Not on file   Intimate Partner Violence:     Fear of Current or Ex-Partner: Not on file    Emotionally Abused: Not on file    Physically Abused: Not on file    Sexually Abused: Not on file   Housing Stability: Unknown    Unable to Pay for Housing in the Last Year: No    Number of [place] in the Last Year: Not on file    Unstable Housing in the Last Year: No       DRUG/FOOD ALLERGIES: Patient has no known allergies. CURRENT MEDICATIONS  Prior to Admission medications    Medication Sig Start Date End Date Taking? Authorizing Provider   metoprolol succinate (TOPROL XL) 50 MG extended release tablet TAKE ONE TABLET BY MOUTH DAILY 10/27/21  Yes Keith Valenzuela,    clomiPRAMINE (ANAFRANIL) 75 MG capsule TAKE TWO CAPSULES BY MOUTH ONCE NIGHTLY 9/13/21  Yes Keith Valenzuela DO   fenofibrate (TRIGLIDE) 160 MG tablet Take 1 tablet by mouth daily 8/23/21  Yes Keith Valenzuela DO   aspirin 81 MG tablet Take 81 mg by mouth daily   Yes Historical Provider, MD       Lab Studies:  Lab Results   Component Value Date    WBC 7.2 02/17/2021    HGB 13.5 02/17/2021    HCT 41.4 02/17/2021    .6 (H) 02/17/2021     02/17/2021     Lab Results   Component Value Date    GLUCOSE 93 08/12/2021    BUN 15 08/12/2021    CREATININE 0.9 08/12/2021    K 4.8 08/12/2021     08/12/2021     08/12/2021    CALCIUM 9.0 08/12/2021     No results found for: MG  No results found for: PHOS  Lab Results   Component Value Date    ALKPHOS 60 08/12/2021    ALT 13 08/12/2021    AST 20 08/12/2021    BILITOT 0.3 08/12/2021    PROT 6.5 08/12/2021             Review of Systems   Constitutional: Negative for activity change, appetite change, chills, fatigue and fever. HENT: Positive for voice change.  Negative for congestion, ear discharge, ear pain, facial swelling, hearing loss, nosebleeds, postnasal drip, rhinorrhea, sinus pressure, sinus pain, sneezing, sore throat, tinnitus and trouble swallowing. Eyes: Negative for pain, redness, itching and visual disturbance. Respiratory: Negative for cough, choking and shortness of breath. Gastrointestinal: Negative for diarrhea and nausea. Endocrine: Negative for cold intolerance and heat intolerance. Objective:   Physical Exam  Constitutional:       General: He is not in acute distress. Appearance: He is well-developed. HENT:      Head: Not macrocephalic and not microcephalic. No abrasion or contusion. Mouth/Throat:      Lips: No lesions. Mouth: No oral lesions. Dentition: Normal dentition. Tongue: No lesions. Palate: No mass. Pharynx: No oropharyngeal exudate, posterior oropharyngeal erythema or uvula swelling. Tonsils: No tonsillar abscesses. Neck:      Thyroid: No thyroid mass or thyromegaly. Trachea: No tracheal tenderness or tracheal deviation. Cardiovascular:      Rate and Rhythm: Normal rate and regular rhythm. Pulmonary:      Breath sounds: No stridor. Musculoskeletal:      Cervical back: Normal range of motion and neck supple. No edema or erythema. No muscular tenderness. Lymphadenopathy:      Head:      Right side of head: No submental, submandibular, tonsillar, preauricular, posterior auricular or occipital adenopathy. Left side of head: No submental, submandibular, tonsillar, preauricular or occipital adenopathy. Cervical: No cervical adenopathy. Right cervical: No superficial, deep or posterior cervical adenopathy. Left cervical: No superficial, deep or posterior cervical adenopathy. Skin:     General: Skin is warm and dry. Findings: No lesion. Neurological:      Mental Status: He is alert and oriented to person, place, and time. Psychiatric:         Mood and Affect: Mood is not anxious or depressed. Impression       Complete opacification of the left maxillary sinus with mild inflammation noted in the left frontal sinus and ethmoid air cells.                      Assessment:       Diagnosis Orders   1. Left maxillary sinusitis     2. Chronic left maxillary sinusitis               Plan:      OR for chronic left maxillary sinusitis. The patient has a diagnosis of chronic maxillary sinusitis which has not been responsive or cannot be treated with maximal medical therapy. The patient has been advised of options including further medical therapy, surgery, or nothing. The risks and benefits of surgery were described not limited to injury to the skull base, brain, stroke, hemorrhage, brain fluid leak, double vision, visual loss, epistaxis, and need for further surgical management of disease. Patient expectations during and after surgery including post-operative sinonasal care and pain control were described. Questions were answered and the patient agreed to proceed with surgery which will be scheduled in an appropriate time frame in line with the severity of disease. The patient was counseled at length about the risks of misty Covid-19 in the [place]-operative and post-operative states including the recovery window of their procedure. The patient was made aware that misty Covid-19 after a surgical procedure may worsen their prognosis for recovering from the virus and lend to a higher morbidity and or mortality risk. The patient was given the options of postponing their procedure. All of the risks, benefits, and alternatives were discussed. The patient does wish to proceed with the procedure.                  Poncho Haro MD

## 2022-02-21 NOTE — PATIENT INSTRUCTIONS
Personalized Preventive Plan for Ivette Lozano - 2/21/2022  Medicare offers a range of preventive health benefits. Some of the tests and screenings are paid in full while other may be subject to a deductible, co-insurance, and/or copay. Some of these benefits include a comprehensive review of your medical history including lifestyle, illnesses that may run in your family, and various assessments and screenings as appropriate. After reviewing your medical record and screening and assessments performed today your provider may have ordered immunizations, labs, imaging, and/or referrals for you. A list of these orders (if applicable) as well as your Preventive Care list are included within your After Visit Summary for your review. Other Preventive Recommendations:    · A preventive eye exam performed by an eye specialist is recommended every 1-2 years to screen for glaucoma; cataracts, macular degeneration, and other eye disorders. · A preventive dental visit is recommended every 6 months. · Try to get at least 150 minutes of exercise per week or 10,000 steps per day on a pedometer . · Order or download the FREE \"Exercise & Physical Activity: Your Everyday Guide\" from [employer]. Call 5-911.251.8524 or search [employer] online. · You need 4043-0827 mg of calcium and 8772-9436 IU of vitamin D per day. It is possible to meet your calcium requirement with diet alone, but a vitamin D supplement is usually necessary to meet this goal.  · When exposed to the sun, use a sunscreen that protects against both UVA and UVB radiation with an SPF of 30 or greater. Reapply every 2 to 3 hours or after sweating, drying off with a towel, or swimming. · Always wear a seat belt when traveling in a car. Always wear a helmet when riding a bicycle or motorcycle.   Patient Education        Chronic Sinusitis: Care Instructions  Your Care Instructions     Sinusitis is an infection of the lining of the sinus cavities in your head. It causes pain and pressure in your head and face. Sinusitis can be short-term (acute) or long-term (chronic). Chronic sinusitis lasts 12 weeks or longer. It is often caused by a bacterial or fungal infection. Other things, such as allergies, may also be involved. Chronic sinusitis may be hard to treat. It can lead to permanent changes in the mucous membranes that line the sinuses. It may make future sinus infections more likely. The infection may take some time to treat. Antibiotics are usually used if the infection is caused by bacteria. You may also need to use a corticosteroid nasal spray. If the infection is not cured after you try two or more different antibiotics, you may want to talk with your doctor about surgery or allergy testing. If the sinusitis is caused by a fungal infection, you may need to take antifungals or other medicines. You may also need surgery. Follow-up care is a key part of your treatment and safety. Be sure to make and go to all appointments, and call your doctor if you are having problems. It's also a good idea to know your test results and keep a list of the medicines you take. How can you care for yourself at home? Medicines    Be safe with medicines. Take your medicines exactly as prescribed. Call your doctor if you think you are having a problem with your medicine. You will get more details on the specific medicines your doctor prescribes.     Take your antibiotics as directed. Do not stop taking them just because you feel better. You need to take the full course of antibiotics.     Your doctor may recommend a corticosteroid nasal spray, wash, drops, or pills. Take this medicine exactly as prescribed. At home    Breathe warm, moist air. You can use a steamy shower, a hot bath, or a sink filled with hot water. Avoid cold, dry air. Using a humidifier in your home may help.  Follow the instructions for cleaning the machine.     Use saline (saltwater) nasal washes every day. This helps keep your nasal passages open. It also can wash out mucus and bacteria. You can buy saline nose drops at a grocery store or drugstore. You can make your own at home. Add 1 teaspoon of salt and 1 teaspoon of baking soda to 2 cups of distilled water. If you make your own, fill a bulb syringe with the solution. Then insert the tip into your nostril and squeeze gently. Florene Manger your nose.     Put a warm, wet towel or a warm gel pack on your face 3 or 4 times a day. Leave it on 5 to 10 minutes each time.     Do not smoke or breathe secondhand smoke. Smoking can make sinusitis worse. If you need help quitting, talk to your doctor about stop-smoking programs and medicines. These can increase your chances of quitting for good. When should you call for help? Call your doctor now or seek immediate medical care if:    You have new or worse symptoms of infection, such as: Increased pain, swelling, warmth, or redness. Red streaks leading from the area. Pus draining from the area. A fever. Watch closely for changes in your health, and be sure to contact your doctor if:    The mucus from your nose becomes thicker (like pus) or has new blood in it.     You do not get better as expected. Where can you learn more? Go to https://Autopilot (formerly Bislr)peSmartRecruiterseb.Cloudcam. org and sign in to your [employer] [place] account. Enter G053 in the [facility] box to learn more about \"Chronic Sinusitis: Care Instructions. \"     If you do not have an account, please click on the \"Sign Up Now\" link. Current as of: September 8, 2021               Content Version: 13.1  © 6925-8603 Healthwise, [employer]. Care instructions adapted under license by [facility] By [facility] ([facility]). If you have questions about a medical condition or this instruction, always ask your healthcare professional. [address] any warranty or liability for your use of this information.          Patient Education Well Visit, Over 72: Care Instructions  Overview     Well visits can help you stay healthy. Your doctor has checked your overall health and may have suggested ways to take good care of yourself. Your doctor also may have recommended tests. At home, you can help prevent illness with healthy eating, regular exercise, and other steps. Follow-up care is a key part of your treatment and safety. Be sure to make and go to all appointments, and call your doctor if you are having problems. It's also a good idea to know your test results and keep a list of the medicines you take. How can you care for yourself at home? Get screening tests that you and your doctor decide on. Screening helps find diseases before any symptoms appear. Eat healthy foods. Choose fruits, vegetables, whole grains, protein, and low-fat dairy foods. Limit fat, especially saturated fat. Reduce salt in your diet. Limit alcohol. If you are a man, have no more than 2 drinks a day or 14 drinks a week. If you are a woman, have no more than 1 drink a day or 7 drinks a week. Since alcohol affects older adults differently, you may want to limit alcohol even more. Or you may not want to drink at all. Get at least 30 minutes of exercise on most days of the week. Walking is a good choice. You also may want to do other activities, such as running, swimming, cycling, or playing tennis or team sports. Reach and stay at a healthy weight. This will lower your risk for many problems, such as obesity, diabetes, heart disease, and high blood pressure. Do not smoke. Smoking can make health problems worse. If you need help quitting, talk to your doctor about stop-smoking programs and medicines. These can increase your chances of quitting for good. Care for your mental health. It is easy to get weighed down by worry and stress. Learn strategies to manage stress, like deep breathing and mindfulness, and stay connected with your family and community.  If you find you often feel sad or hopeless, talk with your doctor. Treatment can help. Talk to your doctor about whether you have any risk factors for sexually transmitted infections (STIs). You can help prevent STIs if you wait to have sex with a new partner (or partners) until you've each been tested for STIs. It also helps if you use condoms (male or female condoms) and if you limit your sex partners to one person who only has sex with you. Vaccines are available for some STIs. If you think you may have a problem with alcohol or drug use, talk to your doctor. This includes prescription medicines (such as amphetamines and opioids) and illegal drugs (such as cocaine and methamphetamine). Your doctor can help you figure out what type of treatment is best for you. Protect your skin from too much sun. When you're outdoors from 10 a.m. to 4 p.m., stay in the shade or cover up with clothing and a hat with a wide brim. Wear sunglasses that block UV rays. Even when it's cloudy, put broad-spectrum sunscreen (SPF 30 or higher) on any exposed skin. See a dentist one or two times a year for checkups and to have your teeth cleaned. Wear a seat belt in the car. When should you call for help? Watch closely for changes in your health, and be sure to contact your doctor if you have any problems or symptoms that concern you. Where can you learn more? Go to https://chgilmar.health-partners. org and sign in to your [employer] account. Enter V404 in the [facility] box to learn more about \"Well Visit, Over 65: Care Instructions. \"     If you do not have an account, please click on the \"Sign Up Now\" link. Current as of: October 6, 2021               Content Version: 13.1  © 7832-5834 Healthwise, Incorporated. Care instructions adapted under license by [facility] ([facility]).  If you have questions about a medical condition or this instruction, always ask your healthcare professional. Renetta York disclaims any warranty or liability for your use of this information.

## 2022-02-21 NOTE — LETTER
[facility]    Surgery Schedule Request Form: 02/21/22  [address] Hermila Syed      DATE OF SURGERY: 3-    TIME OF SURGERY:  9:30AM            CONF #: ____________________       Patient Information:    Patient name: Wil Lawton    YOB: 1950 Age/Sex:71 y.o./male    SS #:[id number]    Wt Readings from Last 1 Encounters:   02/21/22 185 lb (83.9 kg)       Telephone Information:   Mobile [phone]     Home [phone]     Surgeon & Procedure Information:     Lead surgeon: Rhoda Cruz Co-Surgeon: mariela  Phone: [phone] Fax: [phone]  PCP: Marie Casillas DO    Diagnosis: chronic left maxillary sinusitis-J32.0    Procedure name/CPT: left maxillary antrostomy and anterior ethmoidectomy (77746, [id number])     Post operative debridement 02324, 2 units    Procedure length: 45 minutes Anesthesia: General    Special Equipment: no    Patient Status: SDS (OP)  Covid Test:  NO  Primary Payor Plan: Medicare A&B, AARP  Member ID: [id number], [id number]   [address] name: Wil Lawton     [] Implement attached clinical orders for patient.       Electronically signed by Kal Lopez MD on 2/21/2022 at 11:47 AM

## 2022-03-14 ENCOUNTER — OFFICE VISIT (OUTPATIENT)
Dept: INTERNAL MEDICINE CLINIC | Age: 72
End: 2022-03-14
Payer: MEDICARE

## 2022-03-14 VITALS
DIASTOLIC BLOOD PRESSURE: 68 MMHG | BODY MASS INDEX: 27.73 KG/M2 | WEIGHT: 187.2 LBS | OXYGEN SATURATION: 98 % | SYSTOLIC BLOOD PRESSURE: 114 MMHG | HEIGHT: 69 IN | RESPIRATION RATE: 14 BRPM | HEART RATE: 73 BPM

## 2022-03-14 DIAGNOSIS — F32.5 MAJOR DEPRESSIVE DISORDER WITH SINGLE EPISODE, IN FULL REMISSION (HCC): ICD-10-CM

## 2022-03-14 DIAGNOSIS — Z01.818 PREOP EXAM FOR INTERNAL MEDICINE: Primary | ICD-10-CM

## 2022-03-14 DIAGNOSIS — I87.2 VENOUS INSUFFICIENCY OF BOTH LOWER EXTREMITIES: ICD-10-CM

## 2022-03-14 DIAGNOSIS — I49.9 IRREGULAR HEART BEAT: ICD-10-CM

## 2022-03-14 DIAGNOSIS — I10 BENIGN ESSENTIAL HTN: ICD-10-CM

## 2022-03-14 DIAGNOSIS — J32.0 CHRONIC MAXILLARY SINUSITIS: ICD-10-CM

## 2022-03-14 PROCEDURE — 4040F PNEUMOC VAC/ADMIN/RCVD: CPT | Performed by: INTERNAL MEDICINE

## 2022-03-14 PROCEDURE — 3017F COLORECTAL CA SCREEN DOC REV: CPT | Performed by: INTERNAL MEDICINE

## 2022-03-14 PROCEDURE — G8417 CALC BMI ABV UP PARAM F/U: HCPCS | Performed by: INTERNAL MEDICINE

## 2022-03-14 PROCEDURE — 1036F TOBACCO NON-USER: CPT | Performed by: INTERNAL MEDICINE

## 2022-03-14 PROCEDURE — 1123F ACP DISCUSS/DSCN MKR DOCD: CPT | Performed by: INTERNAL MEDICINE

## 2022-03-14 PROCEDURE — G8427 DOCREV CUR MEDS BY ELIG CLIN: HCPCS | Performed by: INTERNAL MEDICINE

## 2022-03-14 PROCEDURE — G8482 FLU IMMUNIZE ORDER/ADMIN: HCPCS | Performed by: INTERNAL MEDICINE

## 2022-03-14 PROCEDURE — 93000 ELECTROCARDIOGRAM COMPLETE: CPT | Performed by: INTERNAL MEDICINE

## 2022-03-14 PROCEDURE — 99214 OFFICE O/P EST MOD 30 MIN: CPT | Performed by: INTERNAL MEDICINE

## 2022-03-14 NOTE — PROGRESS NOTES
[facility]) Physicians  Internal Medicine  Patient Encounter  Joaquín Tolbert D.O., [facility]          Chief Complaint   Patient presents with    Pre-op Exam     3/25/2022, sinus draining, Dr. Riky Bansal       HPI-- 66 y.o. male presents today for a preoperative physical.  Pt diagnosed with chronic left maxillary sinusitis. Patient was initially evaluated by ENT on 1/3/2022 with complaint of voice changes. A flexible laryngoscopy revealed purulent drainage from the left maxillary sinus. This was cultured and revealed MSSA. He was treated with Bactrim. The patient followed up a month later. Nasal endoscopy revealed persistent purulent drainage. He was treated with another round of antibiotics. CT scan of the sinuses on 2/16/2022 showed complete opacification of the left maxillary sinus with mild inflammation in the left frontal sinus and ethmoid air cells. I have been asked to see patient for pre-operative risk assessment and clearance. Surgery scheduled for 3/25/2022 by Dr. Helena Shah at [facility]    Hyperlipidemia:     He remains on Fenofibrate 160 mg daily. Patient tolerates the fenofibrate well without any adverse side effects. He is due for liver tests. HTN--patient remains on Toprol-XL 25 mg daily. He has had issues with orthostatic hypotension but is been doing well in this regard. He denies any lightheadedness or syncope. He denies any headaches, episodes of unilateral weakness, speech or visual disturbances.      Venous reflux-- Previous history----> H/O Left calf DVT x 1. He was on Eliquis for 3 months. He has also had Superficial thrombophlebitis. He was on Ibuprofen for that. He did have a repeat duplex which showed reflux. No surgery recommended. Compression stockings recommended.       Interval history----> patient states he has not had any swelling. He states his walking has helped. He denies any calf pain.   He does not wear his compression stockings at this time.        Major depressive disorder--Patient has a remote history of alcoholism. Patient remained sober for the last 29 years. He feels his mood is under good control. A little over a year ago after attempting to wean off his Anafranil, his anxiety and depression increased. He has a history of anxiety and OCD and alcoholism. After coming off of the Anafranil he started to have more obsessive thoughts and feeling like he was not in control. He had periods of apprehension, fearfulness, nervousness, heart palpitations and heart racing. Patient remains on Anafranil 75 mg twice daily.   He is doing very well.           Medical/Surgical Histories     Past Medical History:   Diagnosis Date    Acquired spondylolisthesis of lumbosacral region 2/8/2019    Alcoholism ([address]     Anxiety     Basal cell cancer 8/2010    Dr. Minerva Dawson Chronic bilateral low back pain without sciatica 2/8/2019    DDD (degenerative disc disease), lumbar 2/8/2019    Depression     DVT, lower extremity ([address] 5/29/2015    Right, gastroc, soleal    Hyperlipidemia     Hypertension     Lumbar facet arthropathy 2/15/2019    Lumbar foraminal stenosis 2/15/2019    Osteopenia     Squamous cell skin cancer 2017    right ear lobe    Stasis dermatitis     Varicose veins of both lower extremities     Venous insufficiency of both lower extremities           Past Surgical History:   Procedure Laterality Date    ANESTHESIA NERVE BLOCK Bilateral 3/25/2019    BILATERAL L3,L4,L5 DR MEDIAL BRANCH BLOCKS WITH FLUOROSCOPY performed by Syl Patterson MD at [address] [facility]      from [address] [place] [address] Bilateral 3/4/2019    BILATERAL L3, L4, L5 DORSAL RAMUS LUMBAR MEDIAL BRANCH BLOCK WITH FLUOROSCOPY performed by Syl Patterson MD at [address] [place] [address] Bilateral 6/3/2019    BILATERAL L3,L4,L5 DORSAL RAMUS RADIOFRQUENCY ABLATION WITH FLUOROSCOPY performed by Syl Patterson MD at [address] [facility] MOUTH SURGERY  02/2019    Uvula lesion, papilloma, Dr. Amaya Guzman  8/2010    SKIN CANCER EXCISION Right 2017    [facility]. Medications/Allergies     Medication Sig    metoprolol succinate (TOPROL XL) 25 MG extended release tablet Take 1 tablet by mouth daily    clomiPRAMINE (ANAFRANIL) 75 MG capsule TAKE TWO CAPSULES BY MOUTH ONCE NIGHTLY    fenofibrate (TRIGLIDE) 160 MG tablet Take 1 tablet by mouth daily         No Known Allergies      Substance Use History     Social History     Tobacco Use    Smoking status: Never Smoker    Smokeless tobacco: Never Used   Substance Use Topics    Alcohol use: No     Comment: Quit 11/3/1992    Drug use: No     Comment: 1992 Narcotic, Benzo, alcohol addition          Family History     Family History   Problem Relation Age of Onset    High Blood Pressure Mother     Heart Disease Mother         a fib    Heart Disease Father     Diabetes Father     Heart Attack Father         No family history of problems with general anesthesia, venous thrombosis, or bleeding dyscrasias. REVIEW OF SYSTEMS:    CONSTITUTIONAL:  Neg   Recent weight changes, fatigue, fever, chills or night sweats, anorexia, Sleep difficulties  EYES: Neg  Blurry vision, loss of vision, double vision, tearing, itching, eye pain. EARS:  Neg Hearing loss, tinnitus, vertigo, discharge or otalgia. NOSE:  Neg  Rhinorrhea, sneezing, itching, allergy, epistaxis, or snoring. No sinus pressure or pain. MOUTH/THROAT:  Neg Bleeding gums, hoarseness, sore throat, dysphagia, throat infections, or dentures. Voice hoarseness improved after antibiotics. RESPIRATORY:  Neg SOB ,wheeze, cough, sputum, hemoptysis. No report of + TB test.    CARDIOVASCULAR:  Neg Chest pain, palpitations, heart murmur, dyspnea on exertion, orthopnea, paroxysmal nocturnal dyspnea or edema of extremities, or claudication. 3 mile walking and running three times per week for 1 hour.   He is able to carry groceries up the stairs. He is able to play pickle ball without difficulty. >4 METS  GASTROINTESTINAL:  Neg   Nausea, vomiting, or diarrhea, constipation hematemesis, heart burn, dysphagia,change in bowel movements or stool caliber, hematochezia, melena, abdominal pain, or food intolerance. Colonoscopy: Yes  GENITOURINARY:  Neg  Urinary frequency, hesitancy, urgency, polyuria, dysuria, hematuria, nocturia, incontinence, change in stream, genital pain or swelling, kidney stones  HEMATOLOGIC/LYMPHATIC:  Neg  Anemia, bleeding dyscrasias, easy bruising, blood clots (DVT/PE), transfusions, or enlarged lymph nodes  MUSCULOSKELETAL:  + Low back pain, much improved. NEUROLOGICAL:  Neg  Loss of Consciousness, memeory loss or forgetfulness, confusion, difficulty concentrating, seizures, insomina, aphasia or dysarthria unilateral weakness or paresthesias, ataxia, headaches, syncope, tremor, or H/O head trauma. PSYCHIATRIC:  Mood is under good control. SKIN :  Neg  Rash, nail changes, sun burns, tattoos, change in moles, or skin color changes  ENDOCRINE:  Neg  Polydipsia,polyuria,abnormal weight changes,heat /cold intolerance, Hair changes. No H/O Diabetes or Thyroid disease.  + Impaired fasting glucose. Physical Exam    Vitals:    03/14/22 1517   BP: 114/68   Pulse: 73   Resp: 14   SpO2: 98%   Weight: 187 lb 3.2 oz (84.9 kg)   Height: 5' 9\" (1.753 m)     Body mass index is 27.64 kg/m². Wt Readings from Last 3 Encounters:   03/14/22 187 lb 3.2 oz (84.9 kg)   02/21/22 185 lb (83.9 kg)   02/21/22 185 lb 9.6 oz (84.2 kg)     BP Readings from Last 3 Encounters:   03/14/22 114/68   02/21/22 112/66   02/21/22 112/66      GEN:  66 y.o. male who is in NAD, A&O. He appears stated age and well nourished. He is cooperative and pleasant. HEAD:  NC/AT, no lesions. EYES:  ERUM, EOMI, No scleral icterus or conjunctival injection or discharge. Visual fields in tact to confrontation.     EARS:  EAC's clear, TM's normal.  MOUTH & THROAT:  Oral cavity is clear without mucosal lesions. Tongue is midline. Dentition is in good repair. No pharyngeal erythema or exudate. NECK:  Supple. Full ROM. Trachea is midline. No increased JVD. No thyromegaly or nodules. No masses  LYMPH: No C/SC/A/F nodes  CARDIAC:  S1S2 NL. Regular rhythm, no murmurs. + ectopy versus rate variation  VASC:  Pedal pulses 2/4. Carotid upstrokes 2+. No bruits noted. PULM:  Lungs are CTA. Symmetric breath sounds noted. AP Diameter NL. GI:  Abdomen is soft and nontender. No distension. No organomegaly. No masses. No pulsatile masses. EXT:  No Cyanosis or clubbing. No edema. SKIN: Warm and dry, normal turgor, no rash or lesions of concern. NEURO: No focal or lateralizing deficits. No ataxia. Moves all extremities symmetrically   PSYCH:  Mood and affect NL. Judgement and insight NL. Encounter Diagnoses   Name Primary?  Preop exam for internal medicine Yes    Chronic maxillary sinusitis     Benign essential HTN     Major depressive disorder with single episode, in full remission ([address]     Irregular heart beat     Venous insufficiency of both lower extremities        Plan:  Acceptable risk for the planned procedure.   No contraindications at this time    EKG-- See report  Lab drawn      Pt will avoid NSAID's, OTC vitamin supplements and fish oil 1 week prior to procedure            Electronically Signed:  Bob June D.O      Copy of H&P given to pt and sent to Sx

## 2022-03-16 NOTE — PROGRESS NOTES
following)  Patient is a minor, ____ years of age, or unable to sign because:   ______________________________________________________________________________________________    [place] If a phone consent is obtained, consent will be documented by using two health care professionals, each affirming that the consenting party has no questions and gives consent for the procedure discussed with the physician/provider.   _____________________          ____________________       _____/_____am/pm   2nd witness to phone consent        Printed name           Date / Time    Informed Consent:  I have provided the explanation described above in section 1 to the patient and/or legal representative.  I have provided the patient and/or legal representative with an opportunity to ask any questions about the proposed operation/procedure.   ___________________________          ____________________         ____/____am/pm  Provider / Proceduralist                            Printed name            Date / Time  Revised 8/2/2021                                                                      Page 2 of 2

## 2022-03-16 NOTE — PROGRESS NOTES
PRE-OP INSTRUCTIONS FOR THE SURGICAL PATIENT YOU ARE UNABLE TO MAKE CONTACT FOR AN INTERVIEW:    All patients having surgery or anesthesia are required to be Covid tested OR to have been vaccinated at least 14 days prior to your procedure. It is very important to return our call to [phone] and notify the staff of your last vaccination date otherwise you will be required to complete Covid PCR test within the 5-6 days prior to surgery & quarantine. The results will need to be faxed to PreAdmission Testing at [phone]. 1. Follow all instructions provided to you from your surgeons office, including your ARRIVAL TIME. 2. Enter the MAIN entrance located on [address] and report to the desk. 3. Bring your insurance & photo ID with you. You may also be asked to pay a co-pay, as you may want to bring a check or credit card with you. 4. Leave all other valuables at home. 5. Arrange for someone to drive you home and be with you for the first 24 hours after discharge. 6. Bring your medication list with you day of surgery with doses and frequency listed (including over the counter medications)  7. You must contact your surgeon for Instructions regarding:              - ALL medication instructions, especially if taking blood thinners, aspirin, or diabetic medication.         -Bariatric patients call surgeon if on diabetic medications as some need to be stopped 1 week preop  - IF  there is a change in your physical condition such as a cold, fever, rash, cuts, sores or any other infection, especially near your surgical site. 8. A Pre-op History and Physical for surgery MUST be completed by your Physician or an Urgent Care within 30 days of your procedure date. Please bring a copy with you on the day of your procedure and along with any other testing performed. 9. DO NOT EAT ANYTHING eight hours prior to arrival for surgery.   May have up to 8 ounces of water 4 hours prior to arrival for surgery. NOTE: ALL Gastric, Bariatric and Bowel surgery patients MUST follow their surgeon's instructions. 10. No gum, candy, mints, or ice chips day of procedure. 11. Please refrain from drinking alcohol the day before or day of your procedure. 12. Please do not smoke the day of your procedure. 13. Dress in loose, comfortable clothing appropriate for redressing after your procedure. Do not wear jewelry (including body piercings), make-up, fingernail polish, lotion, powders or metal hairclips. 15. Contacts will need to be removed prior to surgery. You may want to bring your eye glasses to wear immediately before and after surgery. 14. Dentures will need to be removed before your procedure. 13. Bring cases for your glasses, contacts, dentures, or hearing aids to protect them while you are in surgery. 16. If you use a CPAP, please bring it with you on the day of your procedure. 17. Do not shave or wax for 72 hours prior to procedure near your operative site  18. FOR WOMAN OF CHILDBEARING AGE ONLY- please make sure we can collect a urine sample on arrival.     If you have further questions, you may contact your surgeon's office or us at [phone]     Left instructions on patient's voicemail.     Radha Laird RN.3/16/2022 .11:51 AM

## 2022-03-24 ENCOUNTER — ANESTHESIA EVENT (OUTPATIENT)
Dept: OPERATING ROOM | Age: 72
End: 2022-03-24
Payer: MEDICARE

## 2022-03-25 ENCOUNTER — ANESTHESIA (OUTPATIENT)
Dept: OPERATING ROOM | Age: 72
End: 2022-03-25
Payer: MEDICARE

## 2022-03-25 ENCOUNTER — HOSPITAL ENCOUNTER (OUTPATIENT)
Age: 72
Setting detail: OUTPATIENT SURGERY
Discharge: HOME | End: 2022-03-25
Attending: OTOLARYNGOLOGY | Admitting: OTOLARYNGOLOGY
Payer: MEDICARE

## 2022-03-25 VITALS
WEIGHT: 181.6 LBS | OXYGEN SATURATION: 100 % | HEIGHT: 69 IN | TEMPERATURE: 97.5 F | DIASTOLIC BLOOD PRESSURE: 68 MMHG | BODY MASS INDEX: 26.9 KG/M2 | HEART RATE: 69 BPM | SYSTOLIC BLOOD PRESSURE: 117 MMHG | RESPIRATION RATE: 16 BRPM

## 2022-03-25 VITALS — SYSTOLIC BLOOD PRESSURE: 109 MMHG | DIASTOLIC BLOOD PRESSURE: 60 MMHG | OXYGEN SATURATION: 100 %

## 2022-03-25 DIAGNOSIS — J32.0 CHRONIC LEFT MAXILLARY SINUSITIS: ICD-10-CM

## 2022-03-25 PROCEDURE — 7100000001 HC PACU RECOVERY - ADDTL 15 MIN: Performed by: OTOLARYNGOLOGY

## 2022-03-25 PROCEDURE — 2720000010 HC SURG SUPPLY STERILE: Performed by: OTOLARYNGOLOGY

## 2022-03-25 PROCEDURE — 7100000010 HC PHASE II RECOVERY - FIRST 15 MIN: Performed by: OTOLARYNGOLOGY

## 2022-03-25 PROCEDURE — 2580000003 HC RX 258: Performed by: OTOLARYNGOLOGY

## 2022-03-25 PROCEDURE — 6370000000 HC RX 637 (ALT 250 FOR IP): Performed by: OTOLARYNGOLOGY

## 2022-03-25 PROCEDURE — 7100000000 HC PACU RECOVERY - FIRST 15 MIN: Performed by: OTOLARYNGOLOGY

## 2022-03-25 PROCEDURE — 87205 SMEAR GRAM STAIN: CPT

## 2022-03-25 PROCEDURE — 3700000000 HC ANESTHESIA ATTENDED CARE: Performed by: OTOLARYNGOLOGY

## 2022-03-25 PROCEDURE — 6360000002 HC RX W HCPCS: Performed by: NURSE ANESTHETIST, CERTIFIED REGISTERED

## 2022-03-25 PROCEDURE — 2580000003 HC RX 258: Performed by: ANESTHESIOLOGY

## 2022-03-25 PROCEDURE — 87116 MYCOBACTERIA CULTURE: CPT

## 2022-03-25 PROCEDURE — 31256 EXPLORATION MAXILLARY SINUS: CPT | Performed by: OTOLARYNGOLOGY

## 2022-03-25 PROCEDURE — 2500000003 HC RX 250 WO HCPCS: Performed by: OTOLARYNGOLOGY

## 2022-03-25 PROCEDURE — 7100000011 HC PHASE II RECOVERY - ADDTL 15 MIN: Performed by: OTOLARYNGOLOGY

## 2022-03-25 PROCEDURE — 87070 CULTURE OTHR SPECIMN AEROBIC: CPT

## 2022-03-25 PROCEDURE — 2709999900 HC NON-CHARGEABLE SUPPLY: Performed by: OTOLARYNGOLOGY

## 2022-03-25 PROCEDURE — 3600000004 HC SURGERY LEVEL 4 BASE: Performed by: OTOLARYNGOLOGY

## 2022-03-25 PROCEDURE — 87077 CULTURE AEROBIC IDENTIFY: CPT

## 2022-03-25 PROCEDURE — 87206 SMEAR FLUORESCENT/ACID STAI: CPT

## 2022-03-25 PROCEDURE — 3700000001 HC ADD 15 MINUTES (ANESTHESIA): Performed by: OTOLARYNGOLOGY

## 2022-03-25 PROCEDURE — 2580000003 HC RX 258: Performed by: NURSE ANESTHETIST, CERTIFIED REGISTERED

## 2022-03-25 PROCEDURE — 3600000014 HC SURGERY LEVEL 4 ADDTL 15MIN: Performed by: OTOLARYNGOLOGY

## 2022-03-25 PROCEDURE — 87102 FUNGUS ISOLATION CULTURE: CPT

## 2022-03-25 PROCEDURE — 87015 SPECIMEN INFECT AGNT CONCNTJ: CPT

## 2022-03-25 PROCEDURE — 2500000003 HC RX 250 WO HCPCS: Performed by: NURSE ANESTHETIST, CERTIFIED REGISTERED

## 2022-03-25 PROCEDURE — 31254 NSL/SINS NDSC W/PRTL ETHMDCT: CPT | Performed by: OTOLARYNGOLOGY

## 2022-03-25 PROCEDURE — A4217 STERILE WATER/SALINE, 500 ML: HCPCS | Performed by: OTOLARYNGOLOGY

## 2022-03-25 PROCEDURE — 88305 TISSUE EXAM BY PATHOLOGIST: CPT

## 2022-03-25 RX ORDER — PROPOFOL 10 MG/ML
INJECTION, EMULSION INTRAVENOUS PRN
Status: DISCONTINUED | OUTPATIENT
Start: 2022-03-25 | End: 2022-03-25 | Stop reason: SDUPTHER

## 2022-03-25 RX ORDER — SODIUM CHLORIDE, SODIUM LACTATE, POTASSIUM CHLORIDE, CALCIUM CHLORIDE 600; 310; 30; 20 MG/100ML; MG/100ML; MG/100ML; MG/100ML
INJECTION, SOLUTION INTRAVENOUS CONTINUOUS PRN
Status: DISCONTINUED | OUTPATIENT
Start: 2022-03-25 | End: 2022-03-25 | Stop reason: SDUPTHER

## 2022-03-25 RX ORDER — ROCURONIUM BROMIDE 10 MG/ML
INJECTION, SOLUTION INTRAVENOUS PRN
Status: DISCONTINUED | OUTPATIENT
Start: 2022-03-25 | End: 2022-03-25 | Stop reason: SDUPTHER

## 2022-03-25 RX ORDER — MAGNESIUM HYDROXIDE 1200 MG/15ML
LIQUID ORAL CONTINUOUS PRN
Status: COMPLETED | OUTPATIENT
Start: 2022-03-25 | End: 2022-03-25

## 2022-03-25 RX ORDER — SODIUM CHLORIDE 0.9 % (FLUSH) 0.9 %
5-40 SYRINGE (ML) INJECTION EVERY 12 HOURS SCHEDULED
Status: DISCONTINUED | OUTPATIENT
Start: 2022-03-25 | End: 2022-03-25 | Stop reason: HOSPADM

## 2022-03-25 RX ORDER — OXYCODONE HYDROCHLORIDE 5 MG/1
5 TABLET ORAL
Status: DISCONTINUED | OUTPATIENT
Start: 2022-03-25 | End: 2022-03-25 | Stop reason: HOSPADM

## 2022-03-25 RX ORDER — OXYCODONE HYDROCHLORIDE 5 MG/1
5 TABLET ORAL EVERY 6 HOURS PRN
Qty: 10 TABLET | Refills: 0 | Status: SHIPPED | OUTPATIENT
Start: 2022-03-25 | End: 2022-04-04

## 2022-03-25 RX ORDER — FENTANYL CITRATE 50 UG/ML
INJECTION, SOLUTION INTRAMUSCULAR; INTRAVENOUS PRN
Status: DISCONTINUED | OUTPATIENT
Start: 2022-03-25 | End: 2022-03-25 | Stop reason: SDUPTHER

## 2022-03-25 RX ORDER — LIDOCAINE HYDROCHLORIDE AND EPINEPHRINE 10; 10 MG/ML; UG/ML
INJECTION, SOLUTION INFILTRATION; PERINEURAL PRN
Status: DISCONTINUED | OUTPATIENT
Start: 2022-03-25 | End: 2022-03-25 | Stop reason: ALTCHOICE

## 2022-03-25 RX ORDER — ONDANSETRON 2 MG/ML
INJECTION INTRAMUSCULAR; INTRAVENOUS PRN
Status: DISCONTINUED | OUTPATIENT
Start: 2022-03-25 | End: 2022-03-25 | Stop reason: SDUPTHER

## 2022-03-25 RX ORDER — EPINEPHRINE NASAL SOLUTION 1 MG/ML
SOLUTION NASAL PRN
Status: DISCONTINUED | OUTPATIENT
Start: 2022-03-25 | End: 2022-03-25 | Stop reason: ALTCHOICE

## 2022-03-25 RX ORDER — DEXAMETHASONE SODIUM PHOSPHATE 4 MG/ML
INJECTION, SOLUTION INTRA-ARTICULAR; INTRALESIONAL; INTRAMUSCULAR; INTRAVENOUS; SOFT TISSUE PRN
Status: DISCONTINUED | OUTPATIENT
Start: 2022-03-25 | End: 2022-03-25 | Stop reason: SDUPTHER

## 2022-03-25 RX ORDER — MEPERIDINE HYDROCHLORIDE 25 MG/ML
12.5 INJECTION INTRAMUSCULAR; INTRAVENOUS; SUBCUTANEOUS EVERY 5 MIN PRN
Status: DISCONTINUED | OUTPATIENT
Start: 2022-03-25 | End: 2022-03-25 | Stop reason: HOSPADM

## 2022-03-25 RX ORDER — SODIUM CHLORIDE, SODIUM LACTATE, POTASSIUM CHLORIDE, CALCIUM CHLORIDE 600; 310; 30; 20 MG/100ML; MG/100ML; MG/100ML; MG/100ML
INJECTION, SOLUTION INTRAVENOUS CONTINUOUS
Status: DISCONTINUED | OUTPATIENT
Start: 2022-03-25 | End: 2022-03-25 | Stop reason: HOSPADM

## 2022-03-25 RX ORDER — OXYMETAZOLINE HYDROCHLORIDE 0.05 G/100ML
SPRAY NASAL PRN
Status: DISCONTINUED | OUTPATIENT
Start: 2022-03-25 | End: 2022-03-25 | Stop reason: ALTCHOICE

## 2022-03-25 RX ORDER — SODIUM CHLORIDE 0.9 % (FLUSH) 0.9 %
5-40 SYRINGE (ML) INJECTION PRN
Status: DISCONTINUED | OUTPATIENT
Start: 2022-03-25 | End: 2022-03-25 | Stop reason: HOSPADM

## 2022-03-25 RX ORDER — SODIUM CHLORIDE 9 MG/ML
25 INJECTION, SOLUTION INTRAVENOUS PRN
Status: DISCONTINUED | OUTPATIENT
Start: 2022-03-25 | End: 2022-03-25 | Stop reason: HOSPADM

## 2022-03-25 RX ADMIN — DEXAMETHASONE SODIUM PHOSPHATE 4 MG: 4 INJECTION, SOLUTION INTRAMUSCULAR; INTRAVENOUS at 09:25

## 2022-03-25 RX ADMIN — ROCURONIUM BROMIDE 50 MG: 10 SOLUTION INTRAVENOUS at 09:17

## 2022-03-25 RX ADMIN — SODIUM CHLORIDE, SODIUM LACTATE, POTASSIUM CHLORIDE, AND CALCIUM CHLORIDE: .6; .31; .03; .02 INJECTION, SOLUTION INTRAVENOUS at 08:58

## 2022-03-25 RX ADMIN — FENTANYL CITRATE 50 MCG: 50 INJECTION, SOLUTION INTRAMUSCULAR; INTRAVENOUS at 09:17

## 2022-03-25 RX ADMIN — Medication 50 MG: at 09:17

## 2022-03-25 RX ADMIN — ONDANSETRON 4 MG: 2 INJECTION INTRAMUSCULAR; INTRAVENOUS at 09:49

## 2022-03-25 RX ADMIN — SODIUM CHLORIDE, POTASSIUM CHLORIDE, SODIUM LACTATE AND CALCIUM CHLORIDE: 600; 310; 30; 20 INJECTION, SOLUTION INTRAVENOUS at 08:14

## 2022-03-25 RX ADMIN — PROPOFOL 150 MG: 10 INJECTION, EMULSION INTRAVENOUS at 09:17

## 2022-03-25 ASSESSMENT — PULMONARY FUNCTION TESTS
PIF_VALUE: 15
PIF_VALUE: 2
PIF_VALUE: 14
PIF_VALUE: 15
PIF_VALUE: 1
PIF_VALUE: 15
PIF_VALUE: 16
PIF_VALUE: 13
PIF_VALUE: 0
PIF_VALUE: 17
PIF_VALUE: 15
PIF_VALUE: 15
PIF_VALUE: 0
PIF_VALUE: 15
PIF_VALUE: 1
PIF_VALUE: 18
PIF_VALUE: 21
PIF_VALUE: 15
PIF_VALUE: 14
PIF_VALUE: 1
PIF_VALUE: 22
PIF_VALUE: 15
PIF_VALUE: 17
PIF_VALUE: 5
PIF_VALUE: 16
PIF_VALUE: 15
PIF_VALUE: 0
PIF_VALUE: 16
PIF_VALUE: 15

## 2022-03-25 ASSESSMENT — PAIN SCALES - GENERAL
PAINLEVEL_OUTOF10: 0
PAINLEVEL_OUTOF10: 2
PAINLEVEL_OUTOF10: 2

## 2022-03-25 ASSESSMENT — PAIN DESCRIPTION - PAIN TYPE: TYPE: SURGICAL PAIN

## 2022-03-25 ASSESSMENT — PAIN DESCRIPTION - PROGRESSION: CLINICAL_PROGRESSION: NOT CHANGED

## 2022-03-25 ASSESSMENT — PAIN - FUNCTIONAL ASSESSMENT: PAIN_FUNCTIONAL_ASSESSMENT: 0-10

## 2022-03-25 ASSESSMENT — PAIN DESCRIPTION - LOCATION: LOCATION: NOSE

## 2022-03-25 NOTE — H&P
Atul Patterson    [id number]    [facility] Same Day Surgery Update H & P  Department of General Surgery   Surgical Service   Pre-operative History and Physical  Last H & P within the last 30 days. DIAGNOSIS:   Chronic left maxillary sinusitis [J32.0]    Procedure(s):  LEFT MAXILLARY ANTROSTOMY AND ANTERIOR ETHMOIDECTOMY    History obtained from: Patient interview and EHR      HISTORY OF PRESENT ILLNESS:   The patient is a 66 y.o. male with c/o voice hoariness was found to have left maxillary sinusitis on workup and presents today for the above procedure. Illness Screening: Patient denies fever, chills, worsening cough, or close contact with sick individuals.         Past Medical History:        Diagnosis Date    Acquired spondylolisthesis of lumbosacral region 2/8/2019    Alcoholism ([address]     Anxiety     Basal cell cancer 8/2010    Dr. Keshia Bonilla Chronic bilateral low back pain without sciatica 2/8/2019    DDD (degenerative disc disease), lumbar 2/8/2019    Depression     DVT, lower extremity ([address] 5/29/2015    Right, gastroc, soleal    Hyperlipidemia     Hypertension     Lumbar facet arthropathy 2/15/2019    Lumbar foraminal stenosis 2/15/2019    Osteopenia     Squamous cell skin cancer 2017    right ear lobe    Stasis dermatitis     Varicose veins of both lower extremities     Venous insufficiency of both lower extremities      Past Surgical History:        Procedure Laterality Date    ANESTHESIA NERVE BLOCK Bilateral 3/25/2019    BILATERAL L3,L4,L5 DR MEDIAL BRANCH BLOCKS WITH FLUOROSCOPY performed by Sagar Lamas MD at [address]      from [address] [place] [address] Bilateral 3/4/2019    BILATERAL L3, L4, L5 DORSAL RAMUS LUMBAR MEDIAL BRANCH BLOCK WITH FLUOROSCOPY performed by Sagar Lamas MD at [address] [place] [address] Bilateral 6/3/2019    BILATERAL L3,L4,L5 DORSAL RAMUS RADIOFRQUENCY ABLATION WITH FLUOROSCOPY performed by Nannette Barajas Grey Soni MD at [address]  02/2019    Uvula lesion, papilloma, DrLauren Bernard  8/2010    SKIN CANCER EXCISION Right 2017    [facility]. Medications Prior to Admission:      Prior to Admission medications    Medication Sig Start Date End Date Taking? Authorizing Provider   metoprolol succinate (TOPROL XL) 25 MG extended release tablet Take 1 tablet by mouth daily 1/18/22   Keith Valenzuela DO   clomiPRAMINE (ANAFRANIL) 75 MG capsule TAKE TWO CAPSULES BY MOUTH ONCE NIGHTLY 9/13/21   Keith Valenzuela DO   fenofibrate (TRIGLIDE) 160 MG tablet Take 1 tablet by mouth daily 8/23/21   Keith Valenzuela DO         Allergies:  Patient has no known allergies. PHYSICAL EXAM:      /77   Pulse 80   Temp 97.7 °F (36.5 °C) (Temporal)   Resp 15   Ht 5' 8.5\" (1.74 m)   Wt 181 lb 9.6 oz (82.4 kg)   SpO2 99%   BMI 27.21 kg/m²      Airway:  Airway patent with no audible stridor    Heart:  Regular rate and rhythm, No murmur noted    Lungs:  No increased work of breathing, good air exchange, clear to auscultation bilaterally, no crackles or wheezing    Abdomen:  Soft, non-distended, non-tender, no rebound tenderness or guarding, and no masses palpated    ASSESSMENT AND PLAN     Patient is a 66 y.o. male with above specified procedure planned. 1.  The patients history and physical was obtained and signed off by the pre-admission testing department. Patient seen and focused exam done today- no new changes since last physical exam on 3/14/22    2. Access to ancillary services are available per request of the provider.     GENNA Valles - SHERIDAN     3/25/2022

## 2022-03-25 NOTE — PROGRESS NOTES
Ambulatory Surgery/Procedure Discharge Note    Vitals:    03/25/22 1102   BP: 117/68   Pulse: 69   Resp: 16   Temp: 97.5 °F (36.4 °C)   SpO2: 100%       In: 990 [P.O.:240; I.V.:750]  Out: -     Restroom use offered before discharge. yes    Pain assessment:  Pain level acceptable for discharge    Pain Level: 2        Patient discharged to home/self care.  Patient discharged via wheel chair by transporter to waiting family/S.O.       3/25/2022 11:59 AM

## 2022-03-25 NOTE — BRIEF OP NOTE
Brief Postoperative Note      Patient: Ivette Lozano  YOB: 1950  MRN: [id number]    Date of Procedure: 3/25/2022    Pre-Op Diagnosis: Chronic left maxillary sinusitis [J32.0]    Post-Op Diagnosis: Same       Procedure(s):  LEFT MAXILLARY ANTROSTOMY AND ANTERIOR ETHMOIDECTOMY    Surgeon(s):  Herb Hammer MD    Assistant:  Surgical Assistant: Nneka Thao    Anesthesia: General    Estimated Blood Loss (mL): 50    Complications: None    Specimens:   * No specimens in log *    Implants:  * No implants in log *      Drains: * No LDAs found *    Findings: Severe middle meatal and maxillary inflammation with purulence    Electronically signed by Marlo Jang MD on 3/25/2022 at 8:33 AM

## 2022-03-25 NOTE — PROGRESS NOTES
PACU Transfer to [facility]    Vitals:    03/25/22 1045   BP: 116/69   Pulse: 69   Resp: 22   Temp: 97.5 °F (36.4 °C)   SpO2: 98%         Intake/Output Summary (Last 24 hours) at 3/25/2022 1052  Last data filed at 3/25/2022 1046  Gross per 24 hour   Intake 870 ml   Output --   Net 870 ml       Pain assessment:   Pain Level: 2    Patient transferred to care of [facility] RN.    3/25/2022 10:52 AM

## 2022-03-25 NOTE — OP NOTE
Operative Note      Patient Name: Ivette Lozano  YOB: 1950  Medical Record Number:  [id number]  Billing Number:  [id number]  Date of Procedure: 3/25/2022  Time: 3902    Brief History: This is a 65 y/o male with chronic left maxillary sinusitis refractory to medical therapy  Pre-operative Diagnosis: Chronic maxillary sinusitis    Post-operative Diagnosis: Same  Proc: 1. Left nasal endoscopy with maxillary antrosotomy (70873)   2. Left nasal endoscopy with anterior ethmoidectomy (73572)       Circulator: Stacie Qureshi RN  Scrub Person First: Sami Duncan  Circulator Assist: Carina Foley RN; Elza Daniels    Anesthesia: 1. 8 cc 1% lidocaine with 1:100,000 epinephrine injected in the uncinate  and middle turbinate bilateral  EBL: 50 ml  Specimens: left maxillary sinus contents for culture and pathology  Findings: Severe middle meatal and maxillary inflammation with purulence  Complications: none  Antibiotics: none    After consent was confirmed the patient came into the operating room and was placed in supine position. General IV anesthesia was obtained and the patient intubated by Anesthesiology without difficulty. The table was turned and 8 cc's of 1% lidocaine with 1:100,000 epinephrine was injected into the uncinate, middle turbinate and anterior wall of the sphenoid sinus. Surgery began on the left nasal cavity. Using a zero degree endoscope and and a caudal elevator the middle turbinate was medialized in its inferior third. The uncinate was in fractured with a frontal sinus seeker. A Zoe's window was made with a back biting forceps. The uncinate was removed with a 4mm, 0 degree straight shot micro debrider in its entirety from the inferior turbinate to the skull base. The natural ostium of the maxillary sinus was identified and back-fractured through the fontanelle with a frontal sinus seeker. A large maxillary antrostomy was then performed with the 0 degree micro debrider.       A 0 degree micro-debrider was used to remove the ethmoid bulla lateral to the lamina papyracea , superior to the skull base and posterior to the basal lamella. Tissue from the left maxillary sinus was obtained for culture. The maxillary sinus was thoroughly irrigated with normal saline. Pledgets were removed and the wounds were thoroughly irrigated with sterile saline. Nasopore was placed between the middle turbinates and lateral sinus wall on the left. There was no further significant bleeding. The patient was then awoken from general anesthesia, extubated and sent to the PACU in stable condition. I attest that I was present for and performed the entire procedure myself.      BETHANY        Electronically signed by Desi Leggett MD on 3/25/2022 at 9:52 AM

## 2022-03-25 NOTE — ANESTHESIA POSTPROCEDURE EVALUATION
Department of Anesthesiology  Postprocedure Note    Patient: Marcus Dickens  MRN: [id number]  YOB: 1950  Date of evaluation: 3/25/2022  Time:  2:32 PM     Procedure Summary     Date: 03/25/22 Room / Location: [address] [place] [address] [address] [place] [address] / [facility]    Anesthesia Start: 9450 Anesthesia Stop: 1005    Procedure: LEFT MAXILLARY ANTROSTOMY AND ANTERIOR ETHMOIDECTOMY (Left Nose) Diagnosis:       Chronic left maxillary sinusitis      (Chronic left maxillary sinusitis [J32.0])    Surgeons: Tonie Curry MD Responsible Provider: Jeancarlos Munoz DO    Anesthesia Type: general ASA Status: 3          Anesthesia Type: general    Gigi Phase I: Gigi Score: 10    Gigi Phase II: Gigi Score: 10    Last vitals: Reviewed and per EMR flowsheets.        Anesthesia Post Evaluation    Patient location during evaluation: PACU  Patient participation: complete - patient participated  Level of consciousness: awake and alert  Airway patency: patent  Nausea & Vomiting: no nausea and no vomiting  Complications: no  Cardiovascular status: hemodynamically stable  Respiratory status: acceptable  Hydration status: euvolemic

## 2022-03-25 NOTE — ANESTHESIA PRE PROCEDURE
episode, in full remission ([facility] F32.5    History of substance abuse ([facility] F19.11       Past Medical History:        Diagnosis Date    Acquired spondylolisthesis of lumbosacral region 2/8/2019    Alcoholism ([facility]     Anxiety     Basal cell cancer 8/2010    Dr. Bekah Phillips Chronic bilateral low back pain without sciatica 2/8/2019    DDD (degenerative disc disease), lumbar 2/8/2019    Depression     DVT, lower extremity ([address] 5/29/2015    Right, gastroc, soleal    Hyperlipidemia     Hypertension     Lumbar facet arthropathy 2/15/2019    Lumbar foraminal stenosis 2/15/2019    Osteopenia     Squamous cell skin cancer 2017    right ear lobe    Stasis dermatitis     Varicose veins of both lower extremities     Venous insufficiency of both lower extremities        Past Surgical History:        Procedure Laterality Date    ANESTHESIA NERVE BLOCK Bilateral 3/25/2019    BILATERAL L3,L4,L5 DR MEDIAL BRANCH BLOCKS WITH FLUOROSCOPY performed by Aguila Richardson MD at [facility]      from [facility] Bilateral 3/4/2019    BILATERAL L3, L4, L5 DORSAL RAMUS LUMBAR MEDIAL BRANCH BLOCK WITH FLUOROSCOPY performed by Aguila Richardson MD at [facility] Bilateral 6/3/2019    BILATERAL L3,L4,L5 DORSAL RAMUS RADIOFRQUENCY ABLATION WITH FLUOROSCOPY performed by Aguila Richardson MD at [address]  02/2019    Uvula lesion, papilloma, Dr. Yoni Raman  8/2010    SKIN CANCER EXCISION Right 2017    Debra Cohen.          Social History:    Social History     Tobacco Use    Smoking status: Never Smoker    Smokeless tobacco: Never Used   Substance Use Topics    Alcohol use: No     Comment: Quit 11/3/1992                                Counseling given: Not Answered      Vital Signs (Current):   Vitals:    03/25/22 0726   BP: 136/77   Pulse: 80   Resp: 15   Temp: 97.7 °F (36.5 °C)   TempSrc: Temporal   SpO2: 99%   Weight: 181 lb 9.6 oz (82.4 kg)   Height: 5' 8.5\" (1.74 m)                                              BP Readings from Last 3 Encounters:   03/25/22 136/77   03/14/22 114/68   02/21/22 112/66       NPO Status: Time of last liquid consumption: 2230                        Time of last solid consumption: 2230                        Date of last liquid consumption: 03/24/22                        Date of last solid food consumption: 03/24/22    BMI:   Wt Readings from Last 3 Encounters:   03/25/22 181 lb 9.6 oz (82.4 kg)   03/14/22 187 lb 3.2 oz (84.9 kg)   02/21/22 185 lb (83.9 kg)     Body mass index is 27.21 kg/m². CBC:   Lab Results   Component Value Date    WBC 7.0 03/14/2022    RBC 3.98 03/14/2022    HGB 13.0 03/14/2022    HCT 39.9 03/14/2022    .2 03/14/2022    RDW 13.6 03/14/2022     03/14/2022       CMP:   Lab Results   Component Value Date     03/14/2022    K 4.5 03/14/2022    CL 99 03/14/2022    CO2 25 03/14/2022    BUN 18 03/14/2022    CREATININE 1.0 03/14/2022    GFRAA >60 03/14/2022    GFRAA >60 08/31/2012    AGRATIO 1.9 02/21/2022    LABGLOM >60 03/14/2022    GLUCOSE 78 03/14/2022    PROT 6.7 02/21/2022    PROT 7.1 08/31/2012    CALCIUM 9.4 03/14/2022    BILITOT 0.4 02/21/2022    ALKPHOS 65 02/21/2022    AST 24 02/21/2022    ALT 18 02/21/2022       POC Tests: No results for input(s): POCGLU, POCNA, POCK, POCCL, POCBUN, POCHEMO, POCHCT in the last 72 hours.     Coags: No results found for: PROTIME, INR, APTT    HCG (If Applicable): No results found for: PREGTESTUR, PREGSERUM, HCG, HCGQUANT     ABGs: No results found for: PHART, PO2ART, FEO2ZXJ, IHX2AHU, BEART, I3TMKETY     Type & Screen (If Applicable):  No results found for: LABABO, LABRH    Drug/Infectious Status (If Applicable):  No results found for: HIV, HEPCAB    COVID-19 Screening (If Applicable): No results found for: COVID19        Anesthesia Evaluation  Patient summary reviewed and Nursing notes reviewed  Airway: Mallampati: II  TM

## 2022-03-25 NOTE — PROGRESS NOTES
Pt arrived form  OR, s/p LEFT MAXILLARY ANTROSTOMY AND ANTERIOR ETHMOIDECTOMY - Left, report received from CRNA, pt awake and alert on arrival.  Hob elevated, ice chips given

## 2022-03-30 LAB
ANAEROBIC CULTURE: ABNORMAL
GRAM STAIN RESULT: ABNORMAL
ORGANISM: ABNORMAL
WOUND/ABSCESS: ABNORMAL

## 2022-03-31 ENCOUNTER — OFFICE VISIT (OUTPATIENT)
Dept: ENT CLINIC | Age: 72
End: 2022-03-31
Payer: MEDICARE

## 2022-03-31 DIAGNOSIS — J32.0 CHRONIC LEFT MAXILLARY SINUSITIS: ICD-10-CM

## 2022-03-31 DIAGNOSIS — J32.0 LEFT MAXILLARY SINUSITIS: Primary | ICD-10-CM

## 2022-03-31 PROCEDURE — 31237 NSL/SINS NDSC SURG BX POLYPC: CPT | Performed by: OTOLARYNGOLOGY

## 2022-03-31 NOTE — PROGRESS NOTES
S/P left sided FESS. Doing well. No rhinorrhea or headache. Has left sided congestion. PE: Left middle meatal crusting and blood. Nasal Endoscopy with Debridement  Pre Op Dx: Nasal congestion, post operative sinus surgery  Post Op Dx: Same  Procedure: Nasal endoscopy with debridement left  Anesthesia: 2% lidocaine with afrin tiopical  EBL: None  Specimens: None  Findings: the left side nasal cavity and sinus crusting and mucus. Procedure:    After the application of afrin and pontocaine the nasal sinus cavity was debrided utilizing a zero degree teresa endoscope with Kerrison rongeurs and churchill suctions on the left side. The sinus lining was healing well. No evidence of bleeding or rhinorrhea was noted. Tolerated well. Complications: None    Nasal saline rinses and sprays. Follow up in 4 weeks.

## 2022-04-11 ENCOUNTER — PATIENT MESSAGE (OUTPATIENT)
Dept: INTERNAL MEDICINE CLINIC | Age: 72
End: 2022-04-11

## 2022-04-11 NOTE — TELEPHONE ENCOUNTER
From: Maylin Dias  To: Dr. Janet Orozco: 4/11/2022 1:09 PM EDT  Subject: Murali Erastoenid booster #2. Sinus surgery. Dear Shayy Womack you could please have your staff note in my chart that I received the Buddyrhea Sai booster # 2 today on 4/11/22. Also wanted to let you know my Sinus surgery went well with no complications. Dr Bisi Villanueva was great. Again Elisabet Rosario, thanks for everything.

## 2022-04-25 LAB
FUNGUS (MYCOLOGY) CULTURE: NORMAL
FUNGUS STAIN: NORMAL

## 2022-04-28 ENCOUNTER — OFFICE VISIT (OUTPATIENT)
Dept: ENT CLINIC | Age: 72
End: 2022-04-28
Payer: MEDICARE

## 2022-04-28 VITALS
HEART RATE: 71 BPM | SYSTOLIC BLOOD PRESSURE: 107 MMHG | HEIGHT: 69 IN | WEIGHT: 187 LBS | DIASTOLIC BLOOD PRESSURE: 65 MMHG | BODY MASS INDEX: 27.7 KG/M2

## 2022-04-28 DIAGNOSIS — J34.3 HYPERTROPHY OF NASAL TURBINATES: ICD-10-CM

## 2022-04-28 DIAGNOSIS — R49.0 HOARSE VOICE QUALITY: ICD-10-CM

## 2022-04-28 DIAGNOSIS — J32.0 CHRONIC LEFT MAXILLARY SINUSITIS: Primary | ICD-10-CM

## 2022-04-28 PROCEDURE — 3017F COLORECTAL CA SCREEN DOC REV: CPT | Performed by: OTOLARYNGOLOGY

## 2022-04-28 PROCEDURE — G8417 CALC BMI ABV UP PARAM F/U: HCPCS | Performed by: OTOLARYNGOLOGY

## 2022-04-28 PROCEDURE — G8427 DOCREV CUR MEDS BY ELIG CLIN: HCPCS | Performed by: OTOLARYNGOLOGY

## 2022-04-28 PROCEDURE — 1036F TOBACCO NON-USER: CPT | Performed by: OTOLARYNGOLOGY

## 2022-04-28 PROCEDURE — 4040F PNEUMOC VAC/ADMIN/RCVD: CPT | Performed by: OTOLARYNGOLOGY

## 2022-04-28 PROCEDURE — 1123F ACP DISCUSS/DSCN MKR DOCD: CPT | Performed by: OTOLARYNGOLOGY

## 2022-04-28 PROCEDURE — 99213 OFFICE O/P EST LOW 20 MIN: CPT | Performed by: OTOLARYNGOLOGY

## 2022-04-28 RX ORDER — FLUTICASONE PROPIONATE 50 MCG
1 SPRAY, SUSPENSION (ML) NASAL DAILY
Qty: 3 EACH | Refills: 3 | Status: SHIPPED | OUTPATIENT
Start: 2022-04-28 | End: 2022-10-17

## 2022-04-28 ASSESSMENT — ENCOUNTER SYMPTOMS
SORE THROAT: 0
NAUSEA: 0
VOICE CHANGE: 0
TROUBLE SWALLOWING: 0
CHOKING: 0
SINUS PAIN: 0
SHORTNESS OF BREATH: 0
RHINORRHEA: 0
SINUS PRESSURE: 0
EYE PAIN: 0
FACIAL SWELLING: 0
COUGH: 0
DIARRHEA: 0
EYE ITCHING: 0
EYE REDNESS: 0

## 2022-04-28 NOTE — PROGRESS NOTES
Subjective:      Patient ID: Constanza Rivera is a 66 y.o. male. HPI  Chief Complaint   Patient presents with    Follow up FESS  History of Present Illness:Heriberto is a(n) 66 y.o. male who presents with a one month follow up FESS for left max and anterior ethmoid chronic sinusitis. Doing well. Voice good.  Some congestion    Nasal Discharge: none  Nasal Obstruction: Yes left; minimal  Post Nasal Drainage: No  Nasal Bleeding: No  Sense of Smell: normal  Eye Symptoms: none  Previous Treatments: Surgery     Patient Active Problem List   Diagnosis    Dyslipidemia    Hypotestosteronism    Anxiety    Osteopenia    ED (erectile dysfunction)    Major depression    Swelling    Varicose veins of both lower extremities    Stasis dermatitis    Venous insufficiency of both lower extremities    DDD (degenerative disc disease), lumbar    Acquired spondylolisthesis of lumbosacral region    Chronic bilateral low back pain without sciatica    Spinal stenosis of lumbar region without neurogenic claudication    Lumbar foraminal stenosis    Lumbar facet arthropathy    Palatal lesion    Benign essential HTN    Orthostatic hypotension    Orthostatic hypotension dysautonomic syndrome    Major depressive disorder with single episode, in full remission ([address]    History of substance abuse ([address]     Past Surgical History:   Procedure Laterality Date    ANESTHESIA NERVE BLOCK Bilateral 3/25/2019    BILATERAL L3,L4,L5 DR MEDIAL BRANCH BLOCKS WITH FLUOROSCOPY performed by Antonio Christie MD at [address]      from [facility] Bilateral 3/4/2019    BILATERAL L3, L4, L5 DORSAL RAMUS LUMBAR MEDIAL BRANCH BLOCK WITH FLUOROSCOPY performed by Antonio Christie MD at [facility] Bilateral 6/3/2019    BILATERAL L3,L4,L5 DORSAL RAMUS RADIOFRQUENCY ABLATION WITH FLUOROSCOPY performed by Antonio Christie MD at [address]  02/2019    Uvula lesion, papilloma, Dr. Sadie Treviño Left 3/25/2022    LEFT MAXILLARY ANTROSTOMY AND ANTERIOR ETHMOIDECTOMY performed by Braxton Cuadra MD at [address]  8/2010    SKIN CANCER EXCISION Right 2017    [facility]. Family History   Problem Relation Age of Onset    High Blood Pressure Mother     Heart Disease Mother         a fib    Heart Disease Father     Diabetes Father     Heart Attack Father      Social History     Socioeconomic History    Marital status:      Spouse name: Not on file    Number of children: Not on file    Years of education: Not on file    Highest education level: Not on file   Occupational History    Not on file   Tobacco Use    Smoking status: Never Smoker    Smokeless tobacco: Never Used   Substance and Sexual Activity    Alcohol use: No     Comment: Quit 11/3/1992    Drug use: No     Comment: 1992 Narcotic, Benzo, alcohol addition    Sexual activity: Not on file   Other Topics Concern    Not on file   Social History Narrative    Not on file     Social Determinants of Health     Financial Resource Strain: Low Risk     Difficulty of Paying Living Expenses: Not hard at all   Food Insecurity: No Food Insecurity    Worried About Running Out of Food in the Last Year: Never true    Tali of Food in the Last Year: Never true   Transportation Needs: No Transportation Needs    Lack of Transportation (Medical): No    Lack of Transportation (Non-Medical):  No   Physical Activity: Sufficiently Active    Days of Exercise per Week: 3 days    Minutes of Exercise per Session: 60 min   Stress:     Feeling of Stress : Not on file   Social Connections:     Frequency of Communication with Friends and Family: Not on file    Frequency of Social Gatherings with Friends and Family: Not on file    Attends Voodoo Services: Not on file    Active Member of Clubs or Organizations: Not on file    Attends Club or Organization Meetings: Not on file  Marital Status: Not on file   Intimate Partner Violence:     Fear of Current or Ex-Partner: Not on file    Emotionally Abused: Not on file    Physically Abused: Not on file    Sexually Abused: Not on file   Housing Stability: Unknown    Unable to Pay for Housing in the Last Year: No    Number of [place] in the Last Year: Not on file    Unstable Housing in the Last Year: No       DRUG/FOOD ALLERGIES: Patient has no known allergies. CURRENT MEDICATIONS  Prior to Admission medications    Medication Sig Start Date End Date Taking? Authorizing Provider   metoprolol succinate (TOPROL XL) 25 MG extended release tablet Take 1 tablet by mouth daily 1/18/22  Yes Keith Valenzuela DO   clomiPRAMINE (ANAFRANIL) 75 MG capsule TAKE TWO CAPSULES BY MOUTH ONCE NIGHTLY 9/13/21  Yes Keith Valenzuela DO   fenofibrate (TRIGLIDE) 160 MG tablet Take 1 tablet by mouth daily 8/23/21  Yes Keith Valenzuela DO         Lab Studies:  Lab Results   Component Value Date    WBC 7.0 03/14/2022    HGB 13.0 (L) 03/14/2022    HCT 39.9 (L) 03/14/2022    .2 (H) 03/14/2022     03/14/2022     Lab Results   Component Value Date    GLUCOSE 78 03/14/2022    BUN 18 03/14/2022    CREATININE 1.0 03/14/2022    K 4.5 03/14/2022     03/14/2022    CL 99 03/14/2022    CALCIUM 9.4 03/14/2022     No results found for: MG  No results found for: PHOS  Lab Results   Component Value Date    ALKPHOS 65 02/21/2022    ALT 18 02/21/2022    AST 24 02/21/2022    BILITOT 0.4 02/21/2022    PROT 6.7 02/21/2022     Review of Systems   Constitutional: Negative for activity change, appetite change, chills, fatigue and fever. HENT: Negative for congestion, ear discharge, ear pain, facial swelling, hearing loss, nosebleeds, postnasal drip, rhinorrhea, sinus pressure, sinus pain, sneezing, sore throat, tinnitus, trouble swallowing and voice change. Eyes: Negative for pain, redness, itching and visual disturbance.    Respiratory: Negative for cough, choking and shortness of breath. Gastrointestinal: Negative for diarrhea and nausea. Endocrine: Negative for cold intolerance and heat intolerance. Objective:   Physical Exam  Constitutional:       Appearance: He is well-developed. HENT:      Head: Not macrocephalic and not microcephalic. No Pak's sign, abrasion, right periorbital erythema, left periorbital erythema or laceration. Nose: No nasal deformity, septal deviation, laceration, mucosal edema or rhinorrhea. Right Nostril: No epistaxis or occlusion. Left Nostril: No epistaxis or occlusion. Right Turbinates: Not enlarged, swollen or pale. Left Turbinates: Enlarged. Not swollen or pale. Right Sinus: No maxillary sinus tenderness or frontal sinus tenderness. Left Sinus: No maxillary sinus tenderness or frontal sinus tenderness. Mouth/Throat:      Lips: No lesions. Mouth: Mucous membranes are moist.      Tongue: No lesions. Palate: No mass. Pharynx: Uvula midline. No oropharyngeal exudate or posterior oropharyngeal erythema. Tonsils: No tonsillar abscesses. Eyes:      General:         Right eye: No discharge. Left eye: No discharge. Extraocular Movements:      Right eye: Normal extraocular motion. Left eye: Normal extraocular motion. Conjunctiva/sclera:      Right eye: Right conjunctiva is not injected. No chemosis or exudate. Left eye: Left conjunctiva is not injected. No chemosis or exudate. Neck:      Thyroid: No thyroid mass or thyromegaly. Cardiovascular:      Rate and Rhythm: Normal rate and regular rhythm. Pulmonary:      Effort: No tachypnea or respiratory distress. Lymphadenopathy:      Head:      Right side of head: No preauricular or posterior auricular adenopathy. Left side of head: No preauricular or posterior auricular adenopathy. Cervical:      Right cervical: No superficial, deep or posterior cervical adenopathy.      Left cervical: No superficial, deep or posterior cervical adenopathy. Neurological:      Mental Status: He is alert and oriented to person, place, and time. Assessment:       Diagnosis Orders   1. Chronic left maxillary sinusitis  fluticasone (FLONASE) 50 MCG/ACT nasal spray   2. Hoarse voice quality     3. Hypertrophy of nasal turbinates  fluticasone (FLONASE) 50 MCG/ACT nasal spray           Plan:      The patient was counseled for turbinate hypertrophy and chronic sinusitis and received a flonase prescription medication(s) for this diagnosis. The mechanism of action for the prescription(s) were explained/reviewed. The appropriate usage was described and technique for topical use explained if appropriate. Questions from the patient were answered and the prescription(s) were e-prescribed to the appropriate pharmacy. Follow up as needed.                Lucero Rose MD

## 2022-05-10 LAB
AFB CULTURE (MYCOBACTERIA): NORMAL
AFB SMEAR: NORMAL

## 2022-05-19 RX ORDER — FENOFIBRATE 160 MG/1
TABLET ORAL
Qty: 90 TABLET | Refills: 3 | Status: SHIPPED | OUTPATIENT
Start: 2022-05-19

## 2022-06-24 ENCOUNTER — TELEPHONE (OUTPATIENT)
Dept: ENT CLINIC | Age: 72
End: 2022-06-24

## 2022-06-24 NOTE — TELEPHONE ENCOUNTER
Patient called stating that he recently had sinus sx but now his voice changes and constant throat clearing has returned. Believes he may have an increase in drainage that is causing these symptoms. States he is using Flonase nasal spray with no relief. Would like to know if an antibiotic should be called in for him or if she should schedule an appointment to be seen.  Please advise

## 2022-06-27 ENCOUNTER — OFFICE VISIT (OUTPATIENT)
Dept: ENT CLINIC | Age: 72
End: 2022-06-27
Payer: MEDICARE

## 2022-06-27 DIAGNOSIS — J32.0 CHRONIC LEFT MAXILLARY SINUSITIS: Primary | ICD-10-CM

## 2022-06-27 DIAGNOSIS — K21.9 LARYNGOPHARYNGEAL REFLUX (LPR): ICD-10-CM

## 2022-06-27 DIAGNOSIS — R49.0 HOARSE VOICE QUALITY: ICD-10-CM

## 2022-06-27 PROCEDURE — 1123F ACP DISCUSS/DSCN MKR DOCD: CPT | Performed by: OTOLARYNGOLOGY

## 2022-06-27 PROCEDURE — 1036F TOBACCO NON-USER: CPT | Performed by: OTOLARYNGOLOGY

## 2022-06-27 PROCEDURE — G8427 DOCREV CUR MEDS BY ELIG CLIN: HCPCS | Performed by: OTOLARYNGOLOGY

## 2022-06-27 PROCEDURE — 99214 OFFICE O/P EST MOD 30 MIN: CPT | Performed by: OTOLARYNGOLOGY

## 2022-06-27 PROCEDURE — 3017F COLORECTAL CA SCREEN DOC REV: CPT | Performed by: OTOLARYNGOLOGY

## 2022-06-27 PROCEDURE — G8417 CALC BMI ABV UP PARAM F/U: HCPCS | Performed by: OTOLARYNGOLOGY

## 2022-06-27 RX ORDER — OMEPRAZOLE 40 MG/1
40 CAPSULE, DELAYED RELEASE ORAL
Qty: 90 CAPSULE | Refills: 0 | Status: SHIPPED | OUTPATIENT
Start: 2022-06-27 | End: 2022-09-19

## 2022-06-27 ASSESSMENT — ENCOUNTER SYMPTOMS
SINUS PRESSURE: 0
CHOKING: 0
EYE PAIN: 0
DIARRHEA: 0
FACIAL SWELLING: 0
SHORTNESS OF BREATH: 0
RHINORRHEA: 0
COUGH: 0
TROUBLE SWALLOWING: 0
SORE THROAT: 0
EYE REDNESS: 0
VOICE CHANGE: 1
SINUS PAIN: 0
EYE ITCHING: 0
NAUSEA: 0

## 2022-06-27 NOTE — PROGRESS NOTES
Subjective:      Patient ID: Roseanna Mckeon is a 67 y.o. male. HPI  Voice Disorders        CC: hoarse    Melonie Chandra is a(n) 67 y.o. male who presents with waxing and waning hoarseness. Had sinus surgery in March 2022. Seemed to help but now more frequent. Throat clearing.    Daily:Yes  Worse in in the evening  Dysphagia:No  Odynophagia:No  Cough:No  Dyspnea: No  Recent cold or flu:No  Reflux disease: No  History of throat or chest surgery:No  The voice is gradually worsening  A lack of voice effects my daily life none, some, greatly and severely  Previous episodes:No  Prior voice therapy:No      Patient Active Problem List   Diagnosis    Dyslipidemia    Hypotestosteronism    Anxiety    Osteopenia    ED (erectile dysfunction)    Major depression    Swelling    Varicose veins of both lower extremities    Stasis dermatitis    Venous insufficiency of both lower extremities    DDD (degenerative disc disease), lumbar    Acquired spondylolisthesis of lumbosacral region    Chronic bilateral low back pain without sciatica    Spinal stenosis of lumbar region without neurogenic claudication    Lumbar foraminal stenosis    Lumbar facet arthropathy    Palatal lesion    Benign essential HTN    Orthostatic hypotension    Orthostatic hypotension dysautonomic syndrome    Major depressive disorder with single episode, in full remission ([address]    History of substance abuse [address]     Past Surgical History:   Procedure Laterality Date    ANESTHESIA NERVE BLOCK Bilateral 3/25/2019    BILATERAL L3,L4,L5 DR MEDIAL BRANCH BLOCKS WITH FLUOROSCOPY performed by Loraine Wan MD at [address] [facility]      from [address] [place] [address] Bilateral 3/4/2019    BILATERAL L3, L4, L5 DORSAL RAMUS LUMBAR MEDIAL BRANCH BLOCK WITH FLUOROSCOPY performed by Loraine Wan MD at [address] [place] [address] Bilateral 6/3/2019    BILATERAL L3,L4,L5 DORSAL RAMUS RADIOFRQUENCY ABLATION WITH FLUOROSCOPY performed by Maikel Bueno MD at [address]  02/2019    Uvula lesion, papilloma, Dr. Gibbs Brilliant Left 3/25/2022    LEFT MAXILLARY ANTROSTOMY AND ANTERIOR ETHMOIDECTOMY performed by Nhan Reis MD at [address]  8/2010    SKIN CANCER EXCISION Right 2017    [facility]. Family History   Problem Relation Age of Onset    High Blood Pressure Mother     Heart Disease Mother         a fib    Heart Disease Father     Diabetes Father     Heart Attack Father      Social History     Socioeconomic History    Marital status:      Spouse name: Not on file    Number of children: Not on file    Years of education: Not on file    Highest education level: Not on file   Occupational History    Not on file   Tobacco Use    Smoking status: Never Smoker    Smokeless tobacco: Never Used   Substance and Sexual Activity    Alcohol use: No     Comment: Quit 11/3/1992    Drug use: No     Comment: 1992 Narcotic, Benzo, alcohol addition    Sexual activity: Not on file   Other Topics Concern    Not on file   Social History Narrative    Not on file     Social Determinants of Health     Financial Resource Strain: Low Risk     Difficulty of Paying Living Expenses: Not hard at all   Food Insecurity: No Food Insecurity    Worried About Running Out of Food in the Last Year: Never true    Tali of Food in the Last Year: Never true   Transportation Needs: No Transportation Needs    Lack of Transportation (Medical): No    Lack of Transportation (Non-Medical):  No   Physical Activity: Sufficiently Active    Days of Exercise per Week: 3 days    Minutes of Exercise per Session: 60 min   Stress:     Feeling of Stress : Not on file   Social Connections:     Frequency of Communication with Friends and Family: Not on file    Frequency of Social Gatherings with Friends and Family: Not on file    Attends Mu-ism Services: Not on file   [employer] of Clubs or Organizations: Not on file    Attends Club or Organization Meetings: Not on file    Marital Status: Not on file   Intimate Partner Violence:     Fear of Current or Ex-Partner: Not on file    Emotionally Abused: Not on file    Physically Abused: Not on file    Sexually Abused: Not on file   Housing Stability: Unknown    Unable to Pay for Housing in the Last Year: No    Number of [place] in the Last Year: Not on file    Unstable Housing in the Last Year: No       DRUG/FOOD ALLERGIES: Patient has no known allergies. CURRENT MEDICATIONS  Prior to Admission medications    Medication Sig Start Date End Date Taking? Authorizing Provider   fenofibrate (TRIGLIDE) 160 MG tablet TAKE 1 TABLET BY MOUTH EVERY DAY 5/19/22  Yes Keith Valenzuela DO   fluticasone (FLONASE) 50 MCG/ACT nasal spray 1 spray by Nasal route daily 4/28/22 7/27/22 Yes Parvez Pereira MD   metoprolol succinate (TOPROL XL) 25 MG extended release tablet Take 1 tablet by mouth daily 1/18/22  Yes Keith Valenzuela DO   clomiPRAMINE (ANAFRANIL) 75 MG capsule TAKE TWO CAPSULES BY MOUTH ONCE NIGHTLY 9/13/21  Yes Keith Valenzuela DO       Lab Studies:  Lab Results   Component Value Date    WBC 7.0 03/14/2022    HGB 13.0 (L) 03/14/2022    HCT 39.9 (L) 03/14/2022    .2 (H) 03/14/2022     03/14/2022     Lab Results   Component Value Date    GLUCOSE 78 03/14/2022    BUN 18 03/14/2022    CREATININE 1.0 03/14/2022    K 4.5 03/14/2022     03/14/2022    CL 99 03/14/2022    CALCIUM 9.4 03/14/2022     No results found for: MG  No results found for: PHOS  Lab Results   Component Value Date    ALKPHOS 65 02/21/2022    ALT 18 02/21/2022    AST 24 02/21/2022    BILITOT 0.4 02/21/2022    PROT 6.7 02/21/2022             Review of Systems   Constitutional: Negative for activity change, appetite change, chills, fatigue and fever. HENT: Positive for voice change.  Negative for congestion, ear discharge, ear pain, facial swelling, hearing loss, nosebleeds, postnasal drip, rhinorrhea, sinus pressure, sinus pain, sneezing, sore throat, tinnitus and trouble swallowing. Eyes: Negative for pain, redness, itching and visual disturbance. Respiratory: Negative for cough, choking and shortness of breath. Gastrointestinal: Negative for diarrhea and nausea. Endocrine: Negative for cold intolerance and heat intolerance. Objective:   Physical Exam  Constitutional:       General: He is not in acute distress. Appearance: He is well-developed. HENT:      Head: Not macrocephalic and not microcephalic. No abrasion or contusion. Mouth/Throat:      Lips: No lesions. Mouth: No oral lesions. Dentition: Normal dentition. Tongue: No lesions. Palate: No mass. Pharynx: No oropharyngeal exudate, posterior oropharyngeal erythema or uvula swelling. Tonsils: No tonsillar abscesses. Neck:      Thyroid: No thyroid mass or thyromegaly. Trachea: No tracheal tenderness or tracheal deviation. Cardiovascular:      Rate and Rhythm: Normal rate and regular rhythm. Pulmonary:      Breath sounds: No stridor. Musculoskeletal:      Cervical back: Normal range of motion and neck supple. No edema or erythema. No muscular tenderness. Lymphadenopathy:      Head:      Right side of head: No submental, submandibular, tonsillar, preauricular, posterior auricular or occipital adenopathy. Left side of head: No submental, submandibular, tonsillar, preauricular or occipital adenopathy. Cervical: No cervical adenopathy. Right cervical: No superficial, deep or posterior cervical adenopathy. Left cervical: No superficial, deep or posterior cervical adenopathy. Skin:     General: Skin is warm and dry. Findings: No lesion. Neurological:      Mental Status: He is alert and oriented to person, place, and time. Psychiatric:         Mood and Affect: Mood is not anxious or depressed.          Due to the patients chronic benign and/or malignant laryngeal disease and/or history of laryngeal neoplasm for surveillance a flexible laryngoscopy will be performed to complete a significant physical examination of the patient which cannot be performed by indirect mirror (ie. Gag or incomplete visualization, see physical exam notes). Failure to provide this procedure may lead to late detection of significant chronic benign disease, acute exacerbation, resolution or failure of early diagnosis of recurrent cancer. The procedure report is present in the body of the chart. Flexible Laryngoscopy    Pre op: hoarse voice. Post op: Same  Procedure : FlexibleLaryngoscopy  Surgeon: Seb Angel  Anesthesia: Afrin with 2% lidocaine  Estimated Blood Loss: None    Procedure:   Flexible Laryngoscopy     After obtaining consent, the patient was placed in the examination chair in the upright position. Decongestant and topical anesthetic was sprayed in the After allowing adequate time for hemostatic effect, the flexible 4 mm laryngoscope was passed via the     Nasal Septum: normal;   Nasal Findings: normal;   Nasopharynx: normal   Eustachian Tube: normal   Oropharynx: normal   Base of Tongue: normal   Epiglottis: normal   True Vocal Cord normal   False Vocal Cord: normal   True Vocal Cord Movement: normal   Hypopharynx Mucosa: normal  Arytenoids: pachydermia     * Patient tolerated the procedure well with no complications   * Patient was instructed not to eat for 30 minutes following procedure. * Patient was instructed that they may notice minor bleeding. Attestation:   I was present for the entire viewing, including introduction and removal of the scope. Assessment:       Diagnosis Orders   1. Chronic left maxillary sinusitis     2. Hoarse voice quality  omeprazole (PRILOSEC) 40 MG delayed release capsule   3.  Laryngopharyngeal reflux (LPR)  omeprazole (PRILOSEC) 40 MG delayed release capsule           Plan:      No evidence of recurrent sinus disease. LPR with hoarseness and throat clearing. Omeprazole for 90 days. Follow up after. The patient was counseled for LPR LPR and received a omeprazole prescription medication(s) for this diagnosis. The mechanism of action for the prescription(s) were explained/reviewed. The appropriate usage was described and technique for topical use explained if appropriate. Questions from the patient were answered and the prescription(s) were e-prescribed to the appropriate pharmacy.           Kevin Phan MD

## 2022-08-08 ENCOUNTER — OFFICE VISIT (OUTPATIENT)
Dept: INTERNAL MEDICINE CLINIC | Age: 72
End: 2022-08-08
Payer: MEDICARE

## 2022-08-08 VITALS
DIASTOLIC BLOOD PRESSURE: 64 MMHG | RESPIRATION RATE: 14 BRPM | OXYGEN SATURATION: 97 % | HEIGHT: 69 IN | SYSTOLIC BLOOD PRESSURE: 108 MMHG | BODY MASS INDEX: 28.41 KG/M2 | HEART RATE: 66 BPM | WEIGHT: 191.8 LBS

## 2022-08-08 DIAGNOSIS — R73.01 IMPAIRED FASTING GLUCOSE: ICD-10-CM

## 2022-08-08 DIAGNOSIS — F32.5 MAJOR DEPRESSIVE DISORDER WITH SINGLE EPISODE, IN FULL REMISSION (HCC): ICD-10-CM

## 2022-08-08 DIAGNOSIS — I10 BENIGN ESSENTIAL HTN: Primary | ICD-10-CM

## 2022-08-08 DIAGNOSIS — R42 ORTHOSTATIC DIZZINESS: ICD-10-CM

## 2022-08-08 DIAGNOSIS — I87.2 VENOUS INSUFFICIENCY OF BOTH LOWER EXTREMITIES: ICD-10-CM

## 2022-08-08 DIAGNOSIS — E78.5 DYSLIPIDEMIA: ICD-10-CM

## 2022-08-08 PROBLEM — H26.9 CATARACTS, BILATERAL: Status: ACTIVE | Noted: 2022-08-08

## 2022-08-08 PROCEDURE — G8417 CALC BMI ABV UP PARAM F/U: HCPCS | Performed by: INTERNAL MEDICINE

## 2022-08-08 PROCEDURE — G8427 DOCREV CUR MEDS BY ELIG CLIN: HCPCS | Performed by: INTERNAL MEDICINE

## 2022-08-08 PROCEDURE — 99214 OFFICE O/P EST MOD 30 MIN: CPT | Performed by: INTERNAL MEDICINE

## 2022-08-08 PROCEDURE — 1123F ACP DISCUSS/DSCN MKR DOCD: CPT | Performed by: INTERNAL MEDICINE

## 2022-08-08 PROCEDURE — 1036F TOBACCO NON-USER: CPT | Performed by: INTERNAL MEDICINE

## 2022-08-08 PROCEDURE — 3017F COLORECTAL CA SCREEN DOC REV: CPT | Performed by: INTERNAL MEDICINE

## 2022-08-08 NOTE — PROGRESS NOTES
[facility]  Internal Medicine  Patient Encounter  Angela Coulter D.O., [facility]        Chief Complaint   Patient presents with    Check-Up    Medication Check       HPI: 67 y.o. male seen today requesting his routine checkup regarding the status of current issues listed below along with a medication review and reconciliation. He has been seeing Dr. Yolanda Khan for ongoing hoarseness issues and H/O Sinus disease. He had sinus surgery 3/2022. Pt thought to have LPR. He is on Prilosec 40 mg daily. He is also on Flonase an saline spray. He had another hearing test-- no changes. He wears aides. Hyperlipidemia-- Patient remains on fenofibrate. He has had issues with triglycerides in the past.  He denies any adverse side effects. IFG--Patient denies any polyuria or polydipsia. He said no significant weight changes. He exercises and follows a healthy lifestyle. He has gained some weight-- 10# since 3/2022. He has been as high as 217#. He is walking 60-80 minutes. He may jog a little. Lab Results   Component Value Date    LABA1C 5.3 02/21/2022     Lab Results   Component Value Date    .4 02/21/2022       HTN--Patient denies any symptoms suggestive of accelerated blood pressures. He denies any lightheadedness or syncope. He denies any headache. Denies any episodes of unilateral weakness or paresthesias, speech or visual disturbances. He does have some occasional orthostatic dizziness. He denies CP, chest tightness. Saw eye doctor-- Cataracts. Venous reflux-- Previous history----> H/O Left calf DVT x 1. He was on Eliquis for 3 months. He has also had Superficial thrombophlebitis. He was on Ibuprofen for that. He did have a repeat duplex which showed reflux. No surgery recommended. Compression stockings recommended. Interval history----> problem has been stable. He denies any increased swelling. He denies any calf pain.       Major depressive disorder--previous history ----> patient has a remote history of alcoholism. Patient remained sober for the last 29 years. He feels his mood is under good control. A little over a year ago after attempting to wean off his Anafranil, his anxiety and depression increased. He has a history of anxiety and OCD and alcoholism. After coming off of the Anafranil he started to have more obsessive thoughts and feeling like he was not in control. He had periods of apprehension, fearfulness, nervousness, heart palpitations and heart racing. Interval history----> patient remains on Anafranil as previous. He denies any adverse side effects. He is also on a low-dose of Toprol-XL which has helped with anxiety in the past.  Patient states that he was mostly addicted to benzodiazepines and opioids. Alcohol was the least of his problems way back when. Patient states that he is nearing his 30-year anniversary of sobriety.     Past Medical History:   Diagnosis Date    Acquired spondylolisthesis of lumbosacral region 02/08/2019    Alcoholism ([address]     Anxiety     Basal cell cancer 08/2010    Dr. Dougherty Pickup    Cataracts, bilateral     Chronic bilateral low back pain without sciatica 02/08/2019    DDD (degenerative disc disease), lumbar 02/08/2019    Depression     DVT, lower extremity ([address] 05/29/2015    Right, gastroc, soleal    Hyperlipidemia     Hypertension     Lumbar facet arthropathy 02/15/2019    Lumbar foraminal stenosis 02/15/2019    Osteopenia     Squamous cell skin cancer 2017    right ear lobe    Stasis dermatitis     Varicose veins of both lower extremities     Venous insufficiency of both lower extremities          MEDICATIONS:  Prior to Visit Medications    Medication Sig   omeprazole (PRILOSEC) 40 MG delayed release capsule Take 1 capsule by mouth every morning (before breakfast)   fenofibrate (TRIGLIDE) 160 MG tablet TAKE 1 TABLET BY MOUTH EVERY DAY   fluticasone (FLONASE) 50 MCG/ACT nasal spray 1 spray by Nasal route daily metoprolol succinate (TOPROL XL) 25 MG extended release tablet Take 1 tablet by mouth daily   clomiPRAMINE (ANAFRANIL) 75 MG capsule TAKE TWO CAPSULES BY MOUTH ONCE NIGHTLY           Review of Systems - As per HPI      OBJECTIVE:  Vitals:    08/08/22 0820   BP: 108/64   Pulse: 66   Resp: 14   SpO2: 97%   Weight: 191 lb 12.8 oz (87 kg)   Height: 5' 8.5\" (1.74 m)     Body mass index is 28.74 kg/m². Wt Readings from Last 3 Encounters:   08/08/22 191 lb 12.8 oz (87 kg)   04/28/22 187 lb (84.8 kg)   03/25/22 181 lb 9.6 oz (82.4 kg)     BP Readings from Last 3 Encounters:   08/08/22 108/64   04/28/22 107/65   03/25/22 109/60        GEN: NAD, A&O, Non-toxic  HEENT: NC/AT, DRE, EOMI, Oral cavity Clear,  TM's NL, Nasal cavity clear. NECK: Supple. No thyromegaly. No JVD  LYMPH: No C/SC nodes. CV: S1 S2 NL, RRR. No murmurs, clicks or rubs. + Ectopy. PULM: CTA, symmentric air exchange  EXT: No edema. GI: Abdomen is soft, NT, BS+, No hepatomegaly. No masses. NEURO: No focal or lateralizing deficits. VASC:  No carotid bruits. Pedal pulses symmetric.  + Spider veins, dilated venules, varicose veins bilateral lower extremity  SKIN:  No rashes or lesions of concern      ASSESSMENT/PLAN:    1. Benign essential HTN  Well-controlled  Continue current dose of Toprol XL  - Comprehensive Metabolic Panel; Future  - Lipid Panel; Future    2. Major depressive disorder with single episode, in full remission ([address]  Well-controlled on Anafranil  Continue current medication    3. Venous insufficiency of both lower extremities  Well-controlled  No edema on exam today  Continue walking    4. Dyslipidemia-primary defect was hypertriglyceridemia  Condition stability and control are uncertain  Due for lab  Continue fenofibrate  No need for statin at this time  - Comprehensive Metabolic Panel; Future  - Lipid Panel; Future    5.  Impaired fasting glucose  Condition is asymptomatic  Recheck A1c to ensure he has not transitioned to type 2 diabetes  Continue a carb restricted diet  Continue regular exercise. - Comprehensive Metabolic Panel; Future  - Hemoglobin A1C; Future      6. Orthostatic dizziness  May be multifactorial including medication, dysautonomia  Stay well-hydrated  Encourage patient to rise slowly and pause before walking. Discussed medications with patient who voiced understanding of their use, indication and potential side effects. Pt also understands the above recommendations. All questions answered. This note was generated completely or in part utilizing Dragon dictation speech recognition software. Occasionally, words are mistranscribed and despite editing, the text may contain inaccuracies due to incorrect word recognition.   If further clarification is needed please contact the office at [phone]       Electronically signed    Micki Hernandez D.O.

## 2022-09-09 DIAGNOSIS — F41.9 ANXIETY: ICD-10-CM

## 2022-09-09 DIAGNOSIS — F32.5 MAJOR DEPRESSIVE DISORDER WITH SINGLE EPISODE, IN FULL REMISSION (HCC): ICD-10-CM

## 2022-09-09 DIAGNOSIS — F42.8 OBSESSIVE THINKING: ICD-10-CM

## 2022-09-11 RX ORDER — CLOMIPRAMINE HYDROCHLORIDE 75 MG/1
CAPSULE ORAL
Qty: 180 CAPSULE | Refills: 1 | Status: SHIPPED | OUTPATIENT
Start: 2022-09-11

## 2022-09-12 ENCOUNTER — OFFICE VISIT (OUTPATIENT)
Dept: ENT CLINIC | Age: 72
End: 2022-09-12
Payer: MEDICARE

## 2022-09-12 VITALS
DIASTOLIC BLOOD PRESSURE: 70 MMHG | SYSTOLIC BLOOD PRESSURE: 116 MMHG | WEIGHT: 191 LBS | HEART RATE: 87 BPM | HEIGHT: 69 IN | BODY MASS INDEX: 28.29 KG/M2

## 2022-09-12 DIAGNOSIS — K21.9 LARYNGOPHARYNGEAL REFLUX (LPR): ICD-10-CM

## 2022-09-12 DIAGNOSIS — J32.0 CHRONIC LEFT MAXILLARY SINUSITIS: ICD-10-CM

## 2022-09-12 DIAGNOSIS — R49.0 HOARSE VOICE QUALITY: Primary | ICD-10-CM

## 2022-09-12 PROCEDURE — G8417 CALC BMI ABV UP PARAM F/U: HCPCS | Performed by: OTOLARYNGOLOGY

## 2022-09-12 PROCEDURE — 1123F ACP DISCUSS/DSCN MKR DOCD: CPT | Performed by: OTOLARYNGOLOGY

## 2022-09-12 PROCEDURE — 1036F TOBACCO NON-USER: CPT | Performed by: OTOLARYNGOLOGY

## 2022-09-12 PROCEDURE — 99212 OFFICE O/P EST SF 10 MIN: CPT | Performed by: OTOLARYNGOLOGY

## 2022-09-12 PROCEDURE — 3017F COLORECTAL CA SCREEN DOC REV: CPT | Performed by: OTOLARYNGOLOGY

## 2022-09-12 PROCEDURE — G8427 DOCREV CUR MEDS BY ELIG CLIN: HCPCS | Performed by: OTOLARYNGOLOGY

## 2022-09-12 ASSESSMENT — ENCOUNTER SYMPTOMS
NAUSEA: 0
EYE PAIN: 0
FACIAL SWELLING: 0
SHORTNESS OF BREATH: 0
EYE REDNESS: 0
TROUBLE SWALLOWING: 0
SORE THROAT: 0
CHOKING: 0
SINUS PRESSURE: 0
SINUS PAIN: 0
VOICE CHANGE: 0
RHINORRHEA: 0
COUGH: 0
EYE ITCHING: 0
DIARRHEA: 0

## 2022-09-12 NOTE — PROGRESS NOTES
Subjective:      Patient ID: Cathie Otero is a 67 y.o. male. HPI  Voice improved. Much less throat clearing on omeprazole. Taking daily for 6 weeks. NO sinus complaints. Review of Systems   Constitutional:  Negative for activity change, appetite change, chills, fatigue and fever. HENT:  Negative for congestion, ear discharge, ear pain, facial swelling, hearing loss, nosebleeds, postnasal drip, rhinorrhea, sinus pressure, sinus pain, sneezing, sore throat, tinnitus, trouble swallowing and voice change. Eyes:  Negative for pain, redness, itching and visual disturbance. Respiratory:  Negative for cough, choking and shortness of breath. Gastrointestinal:  Negative for diarrhea and nausea. Endocrine: Negative for cold intolerance and heat intolerance. Objective:   Physical Exam  Constitutional:       General: He is not in acute distress. Appearance: He is well-developed. HENT:      Head: Not macrocephalic and not microcephalic. No abrasion or contusion. Mouth/Throat:      Lips: No lesions. Mouth: No oral lesions. Dentition: Normal dentition. Tongue: No lesions. Palate: No mass. Pharynx: No oropharyngeal exudate, posterior oropharyngeal erythema or uvula swelling. Tonsils: No tonsillar abscesses. Neck:      Thyroid: No thyroid mass or thyromegaly. Trachea: No tracheal tenderness or tracheal deviation. Cardiovascular:      Rate and Rhythm: Normal rate and regular rhythm. Pulmonary:      Breath sounds: No stridor. Musculoskeletal:      Cervical back: Normal range of motion and neck supple. No edema or erythema. No muscular tenderness. Lymphadenopathy:      Head:      Right side of head: No submental, submandibular, tonsillar, preauricular, posterior auricular or occipital adenopathy. Left side of head: No submental, submandibular, tonsillar, preauricular or occipital adenopathy. Cervical: No cervical adenopathy.       Right cervical: No superficial, deep or posterior cervical adenopathy. Left cervical: No superficial, deep or posterior cervical adenopathy. Skin:     General: Skin is warm and dry. Findings: No lesion. Neurological:      Mental Status: He is alert and oriented to person, place, and time. Psychiatric:         Mood and Affect: Mood is not anxious or depressed. Assessment:       Diagnosis Orders   1. Hoarse voice quality        2. Laryngopharyngeal reflux (LPR)        3. Chronic left maxillary sinusitis                Plan:      Finish omeprazole. If returns ask PCP for refills. Possible GI referral.     Sinuses stable. Follow up as needed.          Rubin Trejo MD

## 2022-09-19 ENCOUNTER — PATIENT MESSAGE (OUTPATIENT)
Dept: INTERNAL MEDICINE CLINIC | Age: 72
End: 2022-09-19

## 2022-09-19 DIAGNOSIS — K21.9 LARYNGOPHARYNGEAL REFLUX (LPR): ICD-10-CM

## 2022-09-19 DIAGNOSIS — R49.0 HOARSE VOICE QUALITY: ICD-10-CM

## 2022-09-19 RX ORDER — OMEPRAZOLE 40 MG/1
CAPSULE, DELAYED RELEASE ORAL
Qty: 90 CAPSULE | Refills: 0 | Status: SHIPPED | OUTPATIENT
Start: 2022-09-19

## 2022-09-19 NOTE — TELEPHONE ENCOUNTER
From: Bari Tim  To: Dr. Blanca Oquendo: 9/19/2022 1:12 PM EDT  Subject: Vaccines given    Dear Dedrick Adams want to let your office know that I received the most recent Covid Booster and my Flu shot as well, both on 9/17/22 at [facility] pharmacy. The covid shot was [employer]. If you could record these shots in my chart I would appreciate it. Thank you.

## 2022-10-17 DIAGNOSIS — J34.3 HYPERTROPHY OF NASAL TURBINATES: ICD-10-CM

## 2022-10-17 DIAGNOSIS — J32.0 CHRONIC LEFT MAXILLARY SINUSITIS: ICD-10-CM

## 2022-10-17 RX ORDER — FLUTICASONE PROPIONATE 50 MCG
SPRAY, SUSPENSION (ML) NASAL
Qty: 16 G | Refills: 5 | Status: SHIPPED | OUTPATIENT
Start: 2022-10-17

## 2022-10-18 DIAGNOSIS — R00.0 RACING HEART BEAT: ICD-10-CM

## 2022-10-18 RX ORDER — METOPROLOL SUCCINATE 25 MG/1
25 TABLET, EXTENDED RELEASE ORAL DAILY
Qty: 90 TABLET | Refills: 3 | Status: SHIPPED | OUTPATIENT
Start: 2022-10-18

## 2022-11-14 ENCOUNTER — OFFICE VISIT (OUTPATIENT)
Dept: ENT CLINIC | Age: 72
End: 2022-11-14
Payer: MEDICARE

## 2022-11-14 VITALS
DIASTOLIC BLOOD PRESSURE: 81 MMHG | WEIGHT: 189 LBS | SYSTOLIC BLOOD PRESSURE: 130 MMHG | HEIGHT: 69 IN | HEART RATE: 78 BPM | BODY MASS INDEX: 27.99 KG/M2

## 2022-11-14 DIAGNOSIS — R49.0 HOARSE VOICE QUALITY: ICD-10-CM

## 2022-11-14 DIAGNOSIS — K21.9 LARYNGOPHARYNGEAL REFLUX (LPR): ICD-10-CM

## 2022-11-14 DIAGNOSIS — J32.0 CHRONIC LEFT MAXILLARY SINUSITIS: Primary | ICD-10-CM

## 2022-11-14 PROCEDURE — 31575 DIAGNOSTIC LARYNGOSCOPY: CPT | Performed by: OTOLARYNGOLOGY

## 2022-11-14 NOTE — PROGRESS NOTES
Patient here with persistent throat clearing and hoarseness. Comes and goes. Known reflux. No esophagram. Non smoker. Due to the patients chronic benign and/or malignant laryngeal disease and/or history of laryngeal neoplasm for surveillance a flexible laryngoscopy will be performed to complete a significant physical examination of the patient which cannot be performed by indirect mirror (ie. Gag or incomplete visualization, see physical exam notes). Failure to provide this procedure may lead to late detection of significant chronic benign disease, acute exacerbation, resolution or failure of early diagnosis of recurrent cancer. The procedure report is present in the body of the chart. Flexible Laryngoscopy    Pre op: LPR. Post op: Same  Procedure : FlexibleLaryngoscopy  Surgeon: Benny Patterson  Anesthesia: Afrin with 2% lidocaine  Estimated Blood Loss: None    Procedure:   Flexible Laryngoscopy     After obtaining consent, the patient was placed in the examination chair in the upright position. Decongestant and topical anesthetic was sprayed in the After allowing adequate time for hemostatic effect, the flexible 4 mm laryngoscope was passed via the     Nasal Septum: normal;   Nasal Findings: normal; Patent left maxillary sinus without purulence. Nasopharynx: normal   Eustachian Tube: normal   Oropharynx: normal   Base of Tongue: normal   Epiglottis: normal   True Vocal Cord normal   False Vocal Cord: normal   True Vocal Cord Movement: normal   Hypopharynx Mucosa: mucosal edema  Arytenoids: normal     * Patient tolerated the procedure well with no complications   * Patient was instructed not to eat for 30 minutes following procedure. * Patient was instructed that they may notice minor bleeding. Attestation:   I was present for the entire viewing, including introduction and removal of the scope.

## 2022-11-18 ENCOUNTER — TELEPHONE (OUTPATIENT)
Dept: ENT CLINIC | Age: 72
End: 2022-11-18

## 2022-11-18 ENCOUNTER — HOSPITAL ENCOUNTER (OUTPATIENT)
Dept: GENERAL RADIOLOGY | Age: 72
Discharge: HOME | End: 2022-11-18
Payer: MEDICARE

## 2022-11-18 DIAGNOSIS — R49.0 HOARSE VOICE QUALITY: ICD-10-CM

## 2022-11-18 DIAGNOSIS — K21.9 LARYNGOPHARYNGEAL REFLUX (LPR): ICD-10-CM

## 2022-11-18 PROCEDURE — 74220 X-RAY XM ESOPHAGUS 1CNTRST: CPT

## 2022-11-18 NOTE — TELEPHONE ENCOUNTER
----- Message from Ilda Cardozo MD sent at 11/18/2022 11:05 AM EST -----  Please let patient know his esophagram was essentially normal. Just some mild dysmotility consistent with age.

## 2022-12-19 DIAGNOSIS — R49.0 HOARSE VOICE QUALITY: ICD-10-CM

## 2022-12-19 DIAGNOSIS — K21.9 LARYNGOPHARYNGEAL REFLUX (LPR): ICD-10-CM

## 2022-12-19 RX ORDER — OMEPRAZOLE 40 MG/1
CAPSULE, DELAYED RELEASE ORAL
Qty: 90 CAPSULE | Refills: 0 | Status: SHIPPED | OUTPATIENT
Start: 2022-12-19

## 2022-12-20 ENCOUNTER — APPOINTMENT (OUTPATIENT)
Dept: CT IMAGING | Age: 72
End: 2022-12-20
Payer: MEDICARE

## 2022-12-20 ENCOUNTER — HOSPITAL ENCOUNTER (EMERGENCY)
Age: 72
Discharge: HOME | End: 2022-12-20
Attending: STUDENT IN AN ORGANIZED HEALTH CARE EDUCATION/TRAINING PROGRAM
Payer: MEDICARE

## 2022-12-20 ENCOUNTER — APPOINTMENT (OUTPATIENT)
Dept: GENERAL RADIOLOGY | Age: 72
End: 2022-12-20
Payer: MEDICARE

## 2022-12-20 VITALS
BODY MASS INDEX: 27.91 KG/M2 | HEIGHT: 69 IN | RESPIRATION RATE: 18 BRPM | TEMPERATURE: 98.1 F | SYSTOLIC BLOOD PRESSURE: 120 MMHG | DIASTOLIC BLOOD PRESSURE: 75 MMHG | HEART RATE: 79 BPM | OXYGEN SATURATION: 98 %

## 2022-12-20 DIAGNOSIS — S62.201A CLOSED FRACTURE OF FIRST METACARPAL BONE OF RIGHT HAND, UNSPECIFIED FRACTURE MORPHOLOGY, UNSPECIFIED PORTION OF METACARPAL, INITIAL ENCOUNTER: Primary | ICD-10-CM

## 2022-12-20 LAB
GLUCOSE BLD-MCNC: 87 MG/DL (ref 70–99)
PERFORMED ON: NORMAL

## 2022-12-20 PROCEDURE — 99284 EMERGENCY DEPT VISIT MOD MDM: CPT

## 2022-12-20 PROCEDURE — 70450 CT HEAD/BRAIN W/O DYE: CPT

## 2022-12-20 PROCEDURE — 73130 X-RAY EXAM OF HAND: CPT

## 2022-12-20 ASSESSMENT — PAIN SCALES - GENERAL
PAINLEVEL_OUTOF10: 8
PAINLEVEL_OUTOF10: 5

## 2022-12-20 ASSESSMENT — PAIN DESCRIPTION - DESCRIPTORS
DESCRIPTORS: ACHING
DESCRIPTORS: ACHING

## 2022-12-20 ASSESSMENT — PAIN - FUNCTIONAL ASSESSMENT
PAIN_FUNCTIONAL_ASSESSMENT: 0-10
PAIN_FUNCTIONAL_ASSESSMENT: 0-10

## 2022-12-20 ASSESSMENT — PAIN DESCRIPTION - LOCATION
LOCATION: HAND
LOCATION: HAND

## 2022-12-20 ASSESSMENT — PAIN DESCRIPTION - ORIENTATION: ORIENTATION: RIGHT

## 2022-12-20 ASSESSMENT — PAIN DESCRIPTION - PAIN TYPE: TYPE: ACUTE PAIN

## 2022-12-20 NOTE — ED NOTES
Thumb spica splint applied to pt R arm. Pt instructed on use and verbalized understanding.       Ramakrishna Lewis, RN  12/20/22 [id number]

## 2022-12-20 NOTE — ED NOTES
--Patient provided with discharge instructions and any prescriptions. --Instructions, dosing, and follow-up appointments reviewed with patient/family. No further questions or needs at this time. --Vital signs and patient stable upon discharge. --Patient ambulatory to lobby with significant other.       Chirag Nicholas RN  12/20/22 [id number]

## 2022-12-20 NOTE — DISCHARGE INSTRUCTIONS
We saw you in the hospital for fall, hand injury  Xray showed a fracture of the first metacarpal  Over-the-counter medications: There are many great over the counter medications that can be used to manage your pain. The following are some recommendations of things you can do to manage your short-term pain:    1. Take ibuprofen (Motrin) 600 mg (three over-the-counter) tablets every six hours OR naproxen (Aleve) 220 mg (two over the counter tablets) twice daily as needed for pain and swelling. Ibuprofen and naproxen are anti-inflammatory medications, it manages pain by attacking the cause of the pain, inflammation. These medications works best if you take it as directed, every six hours for the first few days, then cutting back to using it only as needed for pain. 2. If you continue to experience pain while on these medications, consider adding Tylenol (acetaminophen). You may take 650-1000 mg (two regular or extra strength tablets) three times a day. DO NOT TAKE MORE THAN 3000 mg (Six extra-strength tablets) PER DAY. If you routinely consume more than three alcoholic beverages a day, do not take Tylenol before consulting your family physician. Follow up with Dr Mark Hodgkin ([phone]) tomorrow to arrnage follow-up    Keep the splint clean/dry/intact until follow-up. You need to see your regular doctor in 2-3 days to be checked AS NEEDED    You should return to the emergency department if your symptoms worsen or do not resolve. In addition, return if:  - You have a fever (greater than 101 degrees)  - You have chest pain, shortness of breath, abdominal pain, or uncontrollable vomiting  - You are unable to eat or drink  - You pass out  - You have difficulty moving your arms or legs   - You have difficulty speaking or slurred speech  - Or you have any concern that you feel needs acute physician evaluation.

## 2022-12-20 NOTE — ED TRIAGE NOTES
Patient arrived to ED with c/o fall down about 6-7 steps while attempting to carry object up. Patient stated that he missed step. Patient hit his head w/o LOC. Patient presents with right hand/thumb injury with obvious swelling. Patient denies any blood thinners. Abrasions noted to face and left shoulder along with left knee.

## 2022-12-20 NOTE — ED PROVIDER NOTES
[address] [facility]          ATTENDING PHYSICIAN NOTE       Date of evaluation: 12/20/2022    Chief Complaint     Fall (About 6 steps) and Hand Injury (thumb)      History of Present Illness     Aakash Patterson is a 67 y.o. male who presents with right thumb pain    Patient was carrying a gate up a set of stairs. He fell approximately 6 steps. He did not lose consciousness. This was a mechanical fall due to being discombobulated carrying the gait. He has had some very mild nausea but no vomiting since then. He is not confused. He has pain in his right thumb. He is a retired physician. Pain is described as in the right thumb region, moderate in severity, aching in quality, constant in its course since onset about 3 hours ago at the time of the fall, and worsened by any movement or touching of the area. He denies any numbness or weakness. He does endorse some associated swelling of the thumb. He mentions that he has a very subtle headache not associated with any numbness, weakness, paresthesias, vision changes. He denies any spine pain or other extremity pain. He is not anticoagulated does not use any antiplatelet agents. He states that he is up-to-date on his tetanus shot within the last 5 years. PMHx: Hypertension, hyperlipidemia, and as below  SH: Never smoker, no alcohol, and as below    Review of Systems       ROS:   Positive as per HPI.   Negative for:    -Constitutional: fevers, chills    -Eyes:   eye pain, eye discharge    -Ears/Nose/Throat: Ear pain, ear discharge    -Cardiovascular: CP, cyanosis    -Respiratory:  cough, SOB    -Gastrointestinal: Abd pain, nausea, vomiting, melena, hematochezia    -Genitourinary: hematuria, dysuria, urinary frequency    -Neurological: numbness or weakness    -Skin:   Rash, pruritis,     -Hematologic: easy bleeding, easy bruising    -Musculoskeletal:  joint swelling, joint redness    Past Medical, Surgical, Family, and Social History     He has a past medical history of Acquired spondylolisthesis of lumbosacral region, Alcoholism ([facility] [address] Anxiety, Basal cell cancer, Cataracts, bilateral, Chronic bilateral low back pain without sciatica, DDD (degenerative disc disease), lumbar, Depression, DVT, lower extremity ([facility] [address] Hyperlipidemia, Hypertension, Lumbar facet arthropathy, Lumbar foraminal stenosis, Osteopenia, Squamous cell skin cancer, Stasis dermatitis, Varicose veins of both lower extremities, and Venous insufficiency of both lower extremities. He has a past surgical history that includes Facial reconstruction surgery; Skin cancer excision (8/2010); Skin cancer excision (Right, 2017); Lumbar spine surgery (Bilateral, 3/4/2019); Anesthesia Nerve Block (Bilateral, 3/25/2019); Lumbar spine surgery (Bilateral, 6/3/2019); Mouth surgery (02/2019); and Sinus endoscopy (Left, 3/25/2022). His family history includes Diabetes in his father; Heart Attack in his father; Heart Disease in his father and mother; High Blood Pressure in his mother. He reports that he has never smoked. He has never used smokeless tobacco. He reports that he does not drink alcohol and does not use drugs. Medications     Discharge Medication List as of 12/20/2022  6:52 PM        CONTINUE these medications which have NOT CHANGED    Details   omeprazole (PRILOSEC) 40 MG delayed release capsule TAKE 1 CAPSULE BY MOUTH EVERY MORNING BEFORE BREAKFAST, Disp-90 capsule, R-0Normal      metoprolol succinate (TOPROL XL) 25 MG extended release tablet TAKE 1 TABLET BY MOUTH DAILY, Disp-90 tablet, R-3ZERO refills remain on this prescription. Your patient is requesting advance approval of refills for this medication to [address] [place] [address]      fluticasone (FLONASE) 50 MCG/ACT nasal spray INSERT 1 SPRAY INTO THE NOSTRIL(S) EVERY DAY, Disp-16 g, R-[id number] refills remain on this prescription.  Your patient is requesting advance approval of refills for this medication to PREVENT ANY MISSED DOSESNormal      clomiPRAMINE (ANAFRANIL) 75 MG capsule TAKE 2 CAPSULES BY MOUTH EVERY EVENING, Disp-180 capsule, R-1Normal      fenofibrate (TRIGLIDE) 160 MG tablet TAKE 1 TABLET BY MOUTH EVERY DAY, Disp-90 tablet, R-3ZERO refills remain on this prescription. Your patient is requesting advance approval of refills for this medication to [address] [place] [address]             Allergies     He has No Known Allergies. Physical Exam     INITIAL VITALS: BP: 121/78, Temp: 98.1 °F (36.7 °C), Heart Rate: 76, Resp: 18, SpO2: 98 %     General:  Well appearing. No acute distress. Non-toxic appearing    HEENT: Head atraumatic except for mild abrasion to left forehead, no Pak's sign or Raccoon eyes, pupils equal round and reactive to light, EOMI, sclera clear, no facial tenderness to palpation or step offs, no midface instability, no hemotympanum bilaterally, mucus membranes moist, no trismus, no jaw malocclusion, oropharynx WNL , no septal hematoma    Neck:  Supple. Range of motion including lateral rotation more than 45 degrees bilaterally    Pulmonary:   Non-labored breathing. Breath sounds clear bilaterally. Cardiac:  Regular rate and rhythm. No murmurs. Abdomen:  Soft. Non-tender throughout. Non-distended. No masses.     Musculoskeletal: No obvious deformities, no tenderness to palpation except R thumb base and R anatomic snuffbox, no midline C, T or L spine tenderness to palpation, full neck ROM without pain, full ROM in all extremities with no pain  Left Hand  M/R/U tested in detail and intact, palpable radial/ulnar pulses  No bony deformity  Neurovascularly intact in all digits  Normal ROM  AIN/PIN/IO intact  Digital cascade intact  Wounds - none  There is no erythema, warmth, or edema    Right Hand  M/R/U tested in detail and intact, palpable radial/ulnar pulses  No bony deformity  Neurovascularly intact in all digits  Pain limited but at least more than half of AROM  AIN/PIN/IO intact  Digital cascade intact  Wounds - none  There is no erythema, warmth  There is some edema at the base of the right thumb and there is also some anatomic snuffbox tenderness      Vascular:  Extremities warm and perfused. Radial pulses 2+ bilaterally. Skin:  No rash. No other abrasions or lacerations    Neuro: GCS15, AAOx4. CN 2-12 intact. Normal gait. Sensation intact. Strength grossly equal and symmetric. Extremities:  No peripheral edema. Lower extremity symmetric        Diagnostic Results     EKG   None indicated    RADIOLOGY:  XR HAND RIGHT (MIN 3 VIEWS)   Final Result   Comminuted impacted proximal first metacarpal base fracture extending into the [facility] joint. CT Head W/O Contrast   Final Result      No intracranial hemorrhage or mass effect. LABS:   Results for orders placed or performed during the hospital encounter of 12/20/22   POCT Glucose   Result Value Ref Range    POC Glucose 87 70 - 99 mg/dl    Performed on ACCU-CHEK        ED BEDSIDE ULTRASOUND:  None performed    Procedures     None performed    ED Course     Nursing Notes, Past Medical Hx, Past Surgical Hx, Social Hx, Allergies, and Family Hx were reviewed. The patient was given the following medications:  No orders of the defined types were placed in this encounter. CONSULTS:  [address] [place] [address]  DECISIONMAKING / ASSESSMENT / Maverick Rosa is a 67 y.o. male with right thumb pain. Pt was hemodynamically stable and afebrile in the Emergency Department. Patient presents as a trauma. ATLS primary survey was intact. ATLS secondary survey was notable for findings listed above. Based on the patient's advanced age I had a risk-benefit conversation about the use of cross-sectional imaging to exclude a subdural hematoma or other intracranial abnormality. Cross-sectional imaging was agreed to between me and the patient.   This returned without evidence for acute intracranial pathology, and specifically excludes any intracranial hemorrhage. The patient's cervical spine was cleared clinically. He has absolutely no findings suggestive of concern for rib fracture and I do not feel that a screening chest x-ray is warranted at this time. Plain film of the right hand does demonstrate a fracture of the first metacarpal with extension into the joint. I discussed this with Dr. Hali Loredo of the orthopedic service who agrees to call the patient tomorrow and see the patient in close follow-up. The patient was placed in a thumb spica splint and given instructions for wound care and return precautions for any worsening or new symptoms. The patient's compartments of the hand are soft on reassessment. After thumb spica splint I did reassess the patient and he has intact cap refill and sensation in the thumb distally. He denies any new or worsened pain. At this point the patient will be discharged, and he is agreeable to this plan. All questions were answered. Clinical Impression     1.  Closed fracture of first metacarpal bone of right hand, unspecified fracture morphology, unspecified portion of metacarpal, initial encounter        Disposition     PATIENT REFERRED TO:  Enriqueta Inman, [address] [place] [address]  [phone]    Schedule an appointment as soon as possible for a visit   As needed    Benjamín Cornejo MD  [address]  [phone]    Call in 1 day  Discuss your ED visit, and referrals/medication    DISCHARGE MEDICATIONS:  Discharge Medication List as of 12/20/2022  6:52 PM          DISPOSITION Decision To Discharge 12/20/2022 06:34:06 PM         Nigel Richmond MD  12/21/22 [id number]

## 2022-12-21 ENCOUNTER — TELEPHONE (OUTPATIENT)
Dept: INTERNAL MEDICINE CLINIC | Age: 72
End: 2022-12-21

## 2022-12-21 DIAGNOSIS — S62.231A CLOSED DISPLACED FRACTURE OF BASE OF FIRST METACARPAL BONE OF RIGHT HAND, UNSPECIFIED FRACTURE MORPHOLOGY, INITIAL ENCOUNTER: Primary | ICD-10-CM

## 2022-12-21 NOTE — TELEPHONE ENCOUNTER
Patient has scheduled with Dr. Pedro Ponce at [facility]    Referral information sent via ViewRayase this visit doesn't work out

## 2022-12-21 NOTE — TELEPHONE ENCOUNTER
Patient is calling to see if Dr. Lacie Ceballos can send over a referral for an orthopedist within [facility]. He was in ER yesterday after falling down the stairs and has a \"severe fracture\" in his right hand. He was advised to see an orthopedist within a 4-5 days.  Please contact patient with scheduling information

## 2022-12-28 ENCOUNTER — TELEPHONE (OUTPATIENT)
Dept: INTERNAL MEDICINE CLINIC | Age: 72
End: 2022-12-28

## 2022-12-28 NOTE — TELEPHONE ENCOUNTER
Patient's wife is calling to let Dr. Ester Russell know patient's surgery went well on his right thumb and he is home recovering. He has a follow up appt with Dr. Drew Hollingsworth on 01/09 at [place]. Please contact patient's wife with any additional questions.

## 2023-02-21 SDOH — HEALTH STABILITY: PHYSICAL HEALTH: ON AVERAGE, HOW MANY DAYS PER WEEK DO YOU ENGAGE IN MODERATE TO STRENUOUS EXERCISE (LIKE A BRISK WALK)?: 2 DAYS

## 2023-02-21 SDOH — ECONOMIC STABILITY: FOOD INSECURITY: WITHIN THE PAST 12 MONTHS, THE FOOD YOU BOUGHT JUST DIDN'T LAST AND YOU DIDN'T HAVE MONEY TO GET MORE.: NEVER TRUE

## 2023-02-21 SDOH — HEALTH STABILITY: PHYSICAL HEALTH: ON AVERAGE, HOW MANY MINUTES DO YOU ENGAGE IN EXERCISE AT THIS LEVEL?: 140 MIN

## 2023-02-21 SDOH — ECONOMIC STABILITY: FOOD INSECURITY: WITHIN THE PAST 12 MONTHS, YOU WORRIED THAT YOUR FOOD WOULD RUN OUT BEFORE YOU GOT MONEY TO BUY MORE.: NEVER TRUE

## 2023-02-21 SDOH — ECONOMIC STABILITY: INCOME INSECURITY: HOW HARD IS IT FOR YOU TO PAY FOR THE VERY BASICS LIKE FOOD, HOUSING, MEDICAL CARE, AND HEATING?: NOT HARD AT ALL

## 2023-02-21 ASSESSMENT — PATIENT HEALTH QUESTIONNAIRE - PHQ9
5. POOR APPETITE OR OVEREATING: 0
SUM OF ALL RESPONSES TO PHQ9 QUESTIONS 1 & 2: 0
SUM OF ALL RESPONSES TO PHQ QUESTIONS 1-9: 0
4. FEELING TIRED OR HAVING LITTLE ENERGY: 0
10. IF YOU CHECKED OFF ANY PROBLEMS, HOW DIFFICULT HAVE THESE PROBLEMS MADE IT FOR YOU TO DO YOUR WORK, TAKE CARE OF THINGS AT HOME, OR GET ALONG WITH OTHER PEOPLE: 0
6. FEELING BAD ABOUT YOURSELF - OR THAT YOU ARE A FAILURE OR HAVE LET YOURSELF OR YOUR FAMILY DOWN: 0
1. LITTLE INTEREST OR PLEASURE IN DOING THINGS: 0
8. MOVING OR SPEAKING SO SLOWLY THAT OTHER PEOPLE COULD HAVE NOTICED. OR THE OPPOSITE, BEING SO FIGETY OR RESTLESS THAT YOU HAVE BEEN MOVING AROUND A LOT MORE THAN USUAL: 0
9. THOUGHTS THAT YOU WOULD BE BETTER OFF DEAD, OR OF HURTING YOURSELF: 0
SUM OF ALL RESPONSES TO PHQ QUESTIONS 1-9: 0
3. TROUBLE FALLING OR STAYING ASLEEP: 0
SUM OF ALL RESPONSES TO PHQ QUESTIONS 1-9: 0
SUM OF ALL RESPONSES TO PHQ QUESTIONS 1-9: 0
2. FEELING DOWN, DEPRESSED OR HOPELESS: 0
7. TROUBLE CONCENTRATING ON THINGS, SUCH AS READING THE NEWSPAPER OR WATCHING TELEVISION: 0

## 2023-02-21 ASSESSMENT — LIFESTYLE VARIABLES
HOW MANY STANDARD DRINKS CONTAINING ALCOHOL DO YOU HAVE ON A TYPICAL DAY: 0
HOW OFTEN DO YOU HAVE A DRINK CONTAINING ALCOHOL: NEVER
HOW OFTEN DO YOU HAVE SIX OR MORE DRINKS ON ONE OCCASION: 1
HOW MANY STANDARD DRINKS CONTAINING ALCOHOL DO YOU HAVE ON A TYPICAL DAY: PATIENT DOES NOT DRINK
HOW OFTEN DO YOU HAVE A DRINK CONTAINING ALCOHOL: 1

## 2023-02-22 ENCOUNTER — OFFICE VISIT (OUTPATIENT)
Dept: INTERNAL MEDICINE CLINIC | Age: 73
End: 2023-02-22

## 2023-02-22 VITALS
HEIGHT: 69 IN | DIASTOLIC BLOOD PRESSURE: 72 MMHG | RESPIRATION RATE: 14 BRPM | OXYGEN SATURATION: 98 % | HEART RATE: 73 BPM | WEIGHT: 190.2 LBS | SYSTOLIC BLOOD PRESSURE: 132 MMHG | BODY MASS INDEX: 28.17 KG/M2

## 2023-02-22 DIAGNOSIS — R06.83 SNORING: ICD-10-CM

## 2023-02-22 DIAGNOSIS — R35.1 NOCTURIA: ICD-10-CM

## 2023-02-22 DIAGNOSIS — R73.01 IMPAIRED FASTING GLUCOSE: ICD-10-CM

## 2023-02-22 DIAGNOSIS — Z79.899 DRUG THERAPY: ICD-10-CM

## 2023-02-22 DIAGNOSIS — Z00.00 MEDICARE ANNUAL WELLNESS VISIT, SUBSEQUENT: Primary | ICD-10-CM

## 2023-02-22 DIAGNOSIS — Z00.00 MEDICARE ANNUAL WELLNESS VISIT, SUBSEQUENT: ICD-10-CM

## 2023-02-22 DIAGNOSIS — Z91.89 EXCESSIVE COFFEE CONSUMPTION: ICD-10-CM

## 2023-02-22 DIAGNOSIS — I10 BENIGN ESSENTIAL HTN: ICD-10-CM

## 2023-02-22 DIAGNOSIS — Z23 NEED FOR TDAP VACCINATION: ICD-10-CM

## 2023-02-22 DIAGNOSIS — Z12.5 SCREENING FOR PROSTATE CANCER: ICD-10-CM

## 2023-02-22 DIAGNOSIS — Z13.1 SCREENING FOR DIABETES MELLITUS: ICD-10-CM

## 2023-02-22 DIAGNOSIS — R35.0 URINARY FREQUENCY: ICD-10-CM

## 2023-02-22 DIAGNOSIS — F19.11 HISTORY OF SUBSTANCE ABUSE (HCC): ICD-10-CM

## 2023-02-22 DIAGNOSIS — F32.5 MAJOR DEPRESSIVE DISORDER WITH SINGLE EPISODE, IN FULL REMISSION (HCC): ICD-10-CM

## 2023-02-22 DIAGNOSIS — R06.81 WITNESSED EPISODE OF APNEA: ICD-10-CM

## 2023-02-22 LAB
A/G RATIO: 1.5 (ref 1.1–2.2)
ALBUMIN SERPL-MCNC: 4.2 G/DL (ref 3.4–5)
ALP BLD-CCNC: 77 U/L (ref 40–129)
ALT SERPL-CCNC: 14 U/L (ref 10–40)
ANION GAP SERPL CALCULATED.3IONS-SCNC: 12 MMOL/L (ref 3–16)
AST SERPL-CCNC: 20 U/L (ref 15–37)
BASOPHILS ABSOLUTE: 0.1 K/UL (ref 0–0.2)
BASOPHILS RELATIVE PERCENT: 1.1 %
BILIRUB SERPL-MCNC: <0.2 MG/DL (ref 0–1)
BUN BLDV-MCNC: 21 MG/DL (ref 7–20)
CALCIUM SERPL-MCNC: 9.3 MG/DL (ref 8.3–10.6)
CHLORIDE BLD-SCNC: 103 MMOL/L (ref 99–110)
CHOLESTEROL, TOTAL: 149 MG/DL (ref 0–199)
CO2: 26 MMOL/L (ref 21–32)
CREAT SERPL-MCNC: 1.1 MG/DL (ref 0.8–1.3)
EOSINOPHILS ABSOLUTE: 0.8 K/UL (ref 0–0.6)
EOSINOPHILS RELATIVE PERCENT: 8.4 %
GFR SERPL CREATININE-BSD FRML MDRD: >60 ML/MIN/{1.73_M2}
GLUCOSE BLD-MCNC: 99 MG/DL (ref 70–99)
HCT VFR BLD CALC: 35.7 % (ref 40.5–52.5)
HDLC SERPL-MCNC: 47 MG/DL (ref 40–60)
HEMOGLOBIN: 11.9 G/DL (ref 13.5–17.5)
LDL CHOLESTEROL CALCULATED: 83 MG/DL
LYMPHOCYTES ABSOLUTE: 2.4 K/UL (ref 1–5.1)
LYMPHOCYTES RELATIVE PERCENT: 24.7 %
MAGNESIUM: 2.1 MG/DL (ref 1.8–2.4)
MCH RBC QN AUTO: 32.4 PG (ref 26–34)
MCHC RBC AUTO-ENTMCNC: 33.4 G/DL (ref 31–36)
MCV RBC AUTO: 96.8 FL (ref 80–100)
MONOCYTES ABSOLUTE: 0.6 K/UL (ref 0–1.3)
MONOCYTES RELATIVE PERCENT: 6.6 %
NEUTROPHILS ABSOLUTE: 5.7 K/UL (ref 1.7–7.7)
NEUTROPHILS RELATIVE PERCENT: 59.2 %
PDW BLD-RTO: 13.3 % (ref 12.4–15.4)
PLATELET # BLD: 365 K/UL (ref 135–450)
PMV BLD AUTO: 8.2 FL (ref 5–10.5)
POTASSIUM SERPL-SCNC: 4.8 MMOL/L (ref 3.5–5.1)
PROSTATE SPECIFIC ANTIGEN: 0.16 NG/ML (ref 0–4)
RBC # BLD: 3.68 M/UL (ref 4.2–5.9)
SODIUM BLD-SCNC: 141 MMOL/L (ref 136–145)
TOTAL PROTEIN: 7 G/DL (ref 6.4–8.2)
TRIGL SERPL-MCNC: 94 MG/DL (ref 0–150)
TSH SERPL DL<=0.05 MIU/L-ACNC: 3.15 UIU/ML (ref 0.27–4.2)
VLDLC SERPL CALC-MCNC: 19 MG/DL
WBC # BLD: 9.6 K/UL (ref 4–11)

## 2023-02-22 NOTE — PATIENT INSTRUCTIONS
Personalized Preventive Plan for Mony Gould - 2/22/2023  Medicare offers a range of preventive health benefits. Some of the tests and screenings are paid in full while other may be subject to a deductible, co-insurance, and/or copay. Some of these benefits include a comprehensive review of your medical history including lifestyle, illnesses that may run in your family, and various assessments and screenings as appropriate. After reviewing your medical record and screening and assessments performed today your provider may have ordered immunizations, labs, imaging, and/or referrals for you. A list of these orders (if applicable) as well as your Preventive Care list are included within your After Visit Summary for your review. Other Preventive Recommendations:    A preventive eye exam performed by an eye specialist is recommended every 1-2 years to screen for glaucoma; cataracts, macular degeneration, and other eye disorders. A preventive dental visit is recommended every 6 months. Try to get at least 150 minutes of exercise per week or 10,000 steps per day on a pedometer . Order or download the FREE \"Exercise & Physical Activity: Your Everyday Guide\" from [employer]. Call 6-971.336.9442 or search [employer] online. You need 2421-5068 mg of calcium and 7192-4626 IU of vitamin D per day. It is possible to meet your calcium requirement with diet alone, but a vitamin D supplement is usually necessary to meet this goal.  When exposed to the sun, use a sunscreen that protects against both UVA and UVB radiation with an SPF of 30 or greater. Reapply every 2 to 3 hours or after sweating, drying off with a towel, or swimming. Always wear a seat belt when traveling in a car. Always wear a helmet when riding a bicycle or motorcycle.   Possible further allergy testing or empiric treatment with addition of Azelastine (Astelin) nasal spray       Preventing Falls: Care Instructions  Overview     Getting around your home safely can be a challenge if you have injuries or health problems that make it easy for you to fall. Loose rugs and furniture in walkways are among the dangers for many older people who have problems walking or who have poor eyesight. People who have conditions such as arthritis, osteoporosis, or dementia also have to be careful not to fall. You can make your home safer with a few simple measures. Follow-up care is a key part of your treatment and safety. Be sure to make and go to all appointments, and call your doctor if you are having problems. It's also a good idea to know your test results and keep a list of the medicines you take. How can you care for yourself at home? Taking care of yourself  Exercise regularly to improve your strength, muscle tone, and balance. Walk if you can. Swimming may be a good choice if you cannot walk easily. Have your vision and hearing checked each year or any time you notice a change. If you have trouble seeing and hearing, you might not be able to avoid objects and could lose your balance. Know the side effects of the medicines you take. Ask your doctor or pharmacist whether the medicines you take can affect your balance. Sleeping pills or sedatives can affect your balance. Limit the amount of alcohol you drink. Alcohol can impair your balance and other senses. Ask your doctor whether calluses or corns on your feet need to be removed. If you wear loose-fitting shoes because of calluses or corns, you can lose your balance and fall. Talk to your doctor if you have numbness in your feet. You may get dizzy if you do not drink enough water. To prevent dehydration, drink plenty of fluids. Choose water and other clear liquids. If you have kidney, heart, or liver disease and have to limit fluids, talk with your doctor before you increase the amount of fluids you drink.   Preventing falls at home  Remove raised doorway thresholds, throw rugs, and clutter. Repair loose carpet or raised areas in the floor.  Move furniture and electrical cords to keep them out of walking paths.  Use nonskid floor wax, and wipe up spills right away, especially on ceramic tile floors.  If you use a walker or cane, put rubber tips on it. If you use crutches, clean the bottoms of them regularly with an abrasive pad, such as steel wool.  Keep your house well lit, especially stairways, porches, and outside walkways. Use night-lights in areas such as hallways and bathrooms. Add extra light switches or use remote switches (such as switches that go on or off when you clap your hands) to make it easier to turn lights on if you have to get up during the night.  Install sturdy handrails on stairways.  Move items in your cabinets so that the things you use a lot are on the lower shelves (about waist level).  Keep a cordless phone and a flashlight with new batteries by your bed. If possible, put a phone in each of the main rooms of your house, or carry a cell phone in case you fall and cannot reach a phone. Or, you can wear a device around your neck or wrist. You push a button that sends a signal for help.  Wear low-heeled shoes that fit well and give your feet good support. Use footwear with nonskid soles. Check the heels and soles of your shoes for wear. Repair or replace worn heels or soles.  Do not wear socks without shoes on smooth floors, such as wood.  Walk on the grass when the sidewalks are slippery. If you live in an area that gets snow and ice in the winter, sprinkle salt on slippery steps and sidewalks. Or ask a family member or friend to do this for you.  Preventing falls in the bath  Install grab bars and nonskid mats inside and outside your shower or tub and near the toilet and sinks.  Use shower chairs and bath benches.  Use a hand-held shower head that will allow you to sit while showering.  Get into a tub or shower by putting the weaker leg in  first. Get out of a tub or shower with your strong side first.  Repair loose toilet seats and consider installing a raised toilet seat to make getting on and off the toilet easier. Keep your bathroom door unlocked while you are in the shower. Where can you learn more? Go to http://www.aparicio.com/ and enter G117 to learn more about \"Preventing Falls: Care Instructions. \"  Current as of: May 4, 2022               Content Version: 13.5  © 8984-0028 [employer]. Care instructions adapted under license by [facility] ([facility]). If you have questions about a medical condition or this instruction, always ask your healthcare professional. [address] any warranty or liability for your use of this information. Hearing Loss: Care Instructions  Overview     Hearing loss is a sudden or slow decrease in how well you hear. It can range from mild to severe. Permanent hearing loss can occur with aging. It also can happen when you are exposed long-term to loud noise. Examples include listening to loud music, riding motorcycles, or being around other loud machines. Hearing loss can affect your work and home life. It can make you feel lonely or depressed. You may feel that you have lost your independence. But hearing aids and other devices can help you hear better and feel connected to others. Follow-up care is a key part of your treatment and safety. Be sure to make and go to all appointments, and call your doctor if you are having problems. It's also a good idea to know your test results and keep a list of the medicines you take. How can you care for yourself at home? Avoid loud noises whenever possible. This helps keep your hearing from getting worse. Always wear hearing protection around loud noises. Wear a hearing aid as directed. See a professional who can help you pick a hearing aid that fits you. Have hearing tests as your doctor suggests.  They can show whether your hearing has changed. Your hearing aid may need to be adjusted. Use other devices as needed. These may include:  Telephone amplifiers and hearing aids that can connect to a television, stereo, radio, or microphone. Devices that use lights or vibrations. These alert you to the doorbell, a ringing telephone, or a baby monitor. Television closed-captioning. This shows the words at the bottom of the screen. Most new TVs can do this. TTY (text telephone). This lets you type messages back and forth on the telephone instead of talking or listening. These devices are also called TDD. When messages are typed on the keyboard, they are sent over the phone line to a receiving TTY. The message is shown on a monitor. Use text messaging, social media, and email if it is hard for you to communicate by telephone. Try to learn a listening technique called speechreading. It is not lipreading. You pay attention to people's gestures, expressions, posture, and tone of voice. These clues can help you understand what a person is saying. Face the person you are talking to, and have them face you. Make sure the lighting is good. You need to see the other person's face clearly. Think about counseling if you need help to adjust to your hearing loss. When should you call for help? Watch closely for changes in your health, and be sure to contact your doctor if:    You think your hearing is getting worse. You have new symptoms, such as dizziness or nausea. Where can you learn more? Go to http://www.aparicio.com/ and enter R798 to learn more about \"Hearing Loss: Care Instructions. \"  Current as of: May 4, 2022               Content Version: 13.5  © 9594-9567 [employer]. Care instructions adapted under license by [facility] ([facility]).  If you have questions about a medical condition or this instruction, always ask your healthcare professional. [address] any warranty or liability for your use of this information. Preventing Falls: Care Instructions  Overview     Getting around your home safely can be a challenge if you have injuries or health problems that make it easy for you to fall. Loose rugs and furniture in walkways are among the dangers for many older people who have problems walking or who have poor eyesight. People who have conditions such as arthritis, osteoporosis, or dementia also have to be careful not to fall. You can make your home safer with a few simple measures. Follow-up care is a key part of your treatment and safety. Be sure to make and go to all appointments, and call your doctor if you are having problems. It's also a good idea to know your test results and keep a list of the medicines you take. How can you care for yourself at home? Taking care of yourself  Exercise regularly to improve your strength, muscle tone, and balance. Walk if you can. Swimming may be a good choice if you cannot walk easily. Have your vision and hearing checked each year or any time you notice a change. If you have trouble seeing and hearing, you might not be able to avoid objects and could lose your balance. Know the side effects of the medicines you take. Ask your doctor or pharmacist whether the medicines you take can affect your balance. Sleeping pills or sedatives can affect your balance. Limit the amount of alcohol you drink. Alcohol can impair your balance and other senses. Ask your doctor whether calluses or corns on your feet need to be removed. If you wear loose-fitting shoes because of calluses or corns, you can lose your balance and fall. Talk to your doctor if you have numbness in your feet. You may get dizzy if you do not drink enough water. To prevent dehydration, drink plenty of fluids. Choose water and other clear liquids.  If you have kidney, heart, or liver disease and have to limit fluids, talk with your doctor before you increase the amount of fluids you drink.  Preventing falls at home  Remove raised doorway thresholds, throw rugs, and clutter. Repair loose carpet or raised areas in the floor. Move furniture and electrical cords to keep them out of walking paths. Use nonskid floor wax, and wipe up spills right away, especially on ceramic tile floors. If you use a walker or cane, put rubber tips on it. If you use crutches, clean the bottoms of them regularly with an abrasive pad, such as steel wool. Keep your house well lit, especially stairways, porches, and outside walkways. Use night-lights in areas such as hallways and bathrooms. Add extra light switches or use remote switches (such as switches that go on or off when you clap your hands) to make it easier to turn lights on if you have to get up during the night. Install sturdy handrails on stairways. Move items in your cabinets so that the things you use a lot are on the lower shelves (about waist level). Keep a cordless phone and a flashlight with new batteries by your bed. If possible, put a phone in each of the main rooms of your house, or carry a cell phone in case you fall and cannot reach a phone. Or, you can wear a device around your neck or wrist. You push a button that sends a signal for help. Wear low-heeled shoes that fit well and give your feet good support. Use footwear with nonskid soles. Check the heels and soles of your shoes for wear. Repair or replace worn heels or soles. Do not wear socks without shoes on smooth floors, such as wood. Walk on the grass when the sidewalks are slippery. If you live in an area that gets snow and ice in the winter, sprinkle salt on slippery steps and sidewalks. Or ask a family member or friend to do this for you. Preventing falls in the bath  Install grab bars and nonskid mats inside and outside your shower or tub and near the toilet and sinks. Use shower chairs and bath benches.   Use a hand-held shower head that will allow you to sit while showering.  Get into a tub or shower by putting the weaker leg in first. Get out of a tub or shower with your strong side first.  Repair loose toilet seats and consider installing a raised toilet seat to make getting on and off the toilet easier.  Keep your bathroom door unlocked while you are in the shower.  Where can you learn more?  Go to https://www.Catglobe.net/patientEd and enter G117 to learn more about \"Preventing Falls: Care Instructions.\"  Current as of: May 4, 2022               Content Version: 13.5  © 9828-1767 [employer].   Care instructions adapted under license by [employer]. If you have questions about a medical condition or this instruction, always ask your healthcare professional. [employer] disclaims any warranty or liability for your use of this information.           Learning About Getting In and Out of a Bathtub Safely  Introduction  Many falls happen during bathing. All that water makes the bathroom a slippery place.  You may no longer be able to step over the tub wall comfortably and safely.  You might lean on things that aren't meant to support your weight, like a towel bar or the shower curtain.  There are several types of aids that will help keep you safe. You can buy them at [employer] or home improvement stores or online.  What tools can help you get in and out of a tub?  Tub rail and grab bar  There are many types of bars and rails you can install on the walls next to your tub or on the edge of the tub. They will help keep you safe while you're getting in or out of the tub. It's important to have them permanently installed, rather than attached with suction.  Transfer bench  There are several kinds of benches or seats to use in the bathtub. One type sits in the tub and extends out over the side. You sit on the bench. Lift one leg at a time into the tub, sliding your body over as you do so. Another common tub bench sits inside the tub. To use this type you  need to be able to safely step into the tub before you sit down. Nonskid mats  Water makes both the floor of the tub and the bathroom floor slippery. You can buy adhesive strips that stick to the floor of the tub. Or you can use a nonskid tub mat. Outside the tub, make sure you use a rug with a nonskid bottom. Don't use a plain towel. Hand-held shower faucet  With a hand-held faucet, you can get clean without having to lower yourself into the tub. Hang a shower curtain to keep water from spilling out onto the floor. Follow-up care is a key part of your treatment and safety. Be sure to make and go to all appointments, and call your doctor if you are having problems. It's also a good idea to know your test results and keep a list of the medicines you take. Current as of: March 9, 2022               Content Version: 13.5  © 2006-2022 [employer]. Care instructions adapted under license by [facility] ([facility]). If you have questions about a medical condition or this instruction, always ask your healthcare professional. [place] [address] any warranty or liability for your use of this information. Advance Directives: Care Instructions  Overview  An advance directive is a legal way to state your wishes at the end of your life. It tells your family and your doctor what to do if you can't say what you want. There are two main types of advance directives. You can change them any time your wishes change. Living will. This form tells your family and your doctor your wishes about life support and other treatment. The form is also called a declaration. Medical power of . This form lets you name a person to make treatment decisions for you when you can't speak for yourself. This person is called a health care agent (health care proxy, health care surrogate). The form is also called a durable power of  for health care.   If you do not have an advance directive, decisions about your medical care may be made by a family member, or by a doctor or a  who doesn't know you. It may help to think of an advance directive as a gift to the people who care for you. If you have one, they won't have to make tough decisions by themselves. For more information, including forms for your state, see the [address] website (www.caringinfo.org/planning/advance-directives/). Follow-up care is a key part of your treatment and safety. Be sure to make and go to all appointments, and call your doctor if you are having problems. It's also a good idea to know your test results and keep a list of the medicines you take. What should you include in an advance directive? Many states have a unique advance directive form. (It may ask you to address specific issues.) Or you might use a universal form that's approved by many states. If your form doesn't tell you what to address, it may be hard to know what to include in your advance directive. Use the questions below to help you get started. Who do you want to make decisions about your medical care if you are not able to? What life-support measures do you want if you have a serious illness that gets worse over time or can't be cured? What are you most afraid of that might happen? (Maybe you're afraid of having pain, losing your independence, or being kept alive by machines.)  Where would you prefer to die? (Your home? A hospital? A nursing home?)  Do you want to donate your organs when you die? Do you want certain Confucianist practices performed before you die? When should you call for help? Be sure to contact your doctor if you have any questions. Where can you learn more? Go to http://www.aparicio.com/ and enter R264 to learn more about \"Advance Directives: Care Instructions. \"  Current as of: June 16, 2022               Content Version: 13.5  © 0072-2170 Healthwise, Incorporated. Care instructions adapted under license by [facility] ([facility]). If you have questions about a medical condition or this instruction, always ask your healthcare professional. [address] any warranty or liability for your use of this information. A Healthy Heart: Care Instructions  Your Care Instructions     Coronary artery disease, also called heart disease, occurs when a substance called plaque builds up in the vessels that supply oxygen-rich blood to your heart muscle. This can narrow the blood vessels and reduce blood flow. A heart attack happens when blood flow is completely blocked. A high-fat diet, smoking, and other factors increase the risk of heart disease. Your doctor has found that you have a chance of having heart disease. You can do lots of things to keep your heart healthy. It may not be easy, but you can change your diet, exercise more, and quit smoking. These steps really work to lower your chance of heart disease. Follow-up care is a key part of your treatment and safety. Be sure to make and go to all appointments, and call your doctor if you are having problems. It's also a good idea to know your test results and keep a list of the medicines you take. How can you care for yourself at home? Diet    Use less salt when you cook and eat. This helps lower your blood pressure. Taste food before salting. Add only a little salt when you think you need it. With time, your taste buds will adjust to less salt. Eat fewer snack items, fast foods, canned soups, and other high-salt, high-fat, processed foods. Read food labels and try to avoid saturated and trans fats. They increase your risk of heart disease by raising cholesterol levels. Limit the amount of solid fat-butter, margarine, and shortening-you eat. Use olive, peanut, or canola oil when you cook. Bake, broil, and steam foods instead of frying them. Eat a variety of fruit and vegetables every day.  Dark green, deep orange, red, or yellow fruits and vegetables are especially good for you. Examples include spinach, carrots, peaches, and berries. Foods high in fiber can reduce your cholesterol and provide important vitamins and minerals. High-fiber foods include whole-grain cereals and breads, oatmeal, beans, brown rice, citrus fruits, and apples. Eat lean proteins. Heart-healthy proteins include seafood, lean meats and poultry, eggs, beans, peas, nuts, seeds, and soy products. Limit drinks and foods with added sugar. These include candy, desserts, and soda pop. Lifestyle changes    If your doctor recommends it, get more exercise. Walking is a good choice. Bit by bit, increase the amount you walk every day. Try for at least 30 minutes on most days of the week. You also may want to swim, bike, or do other activities. Do not smoke. If you need help quitting, talk to your doctor about stop-smoking programs and medicines. These can increase your chances of quitting for good. Quitting smoking may be the most important step you can take to protect your heart. It is never too late to quit. Limit alcohol to 2 drinks a day for men and 1 drink a day for women. Too much alcohol can cause health problems. Manage other health problems such as diabetes, high blood pressure, and high cholesterol. If you think you may have a problem with alcohol or drug use, talk to your doctor. Medicines    Take your medicines exactly as prescribed. Call your doctor if you think you are having a problem with your medicine. If your doctor recommends aspirin, take the amount directed each day. Make sure you take aspirin and not another kind of pain reliever, such as acetaminophen (Tylenol). When should you call for help? Call 911 if you have symptoms of a heart attack. These may include:    Chest pain or pressure, or a strange feeling in the chest.     Sweating. Shortness of breath.      Pain, pressure, or a strange feeling in the back, neck, jaw, or upper belly or in one or both shoulders or arms. Lightheadedness or sudden weakness. A fast or irregular heartbeat. After you call 911, the  may tell you to chew 1 adult-strength or 2 to 4 low-dose aspirin. Wait for an ambulance. Do not try to drive yourself. Watch closely for changes in your health, and be sure to contact your doctor if you have any problems. Where can you learn more? Go to http://www.aparicio.com/ and enter F075 to learn more about \"A Healthy Heart: Care Instructions. \"  Current as of: September 7, 2022               Content Version: 13.5  © 0838-1777 Healthwise, Incorporated. Care instructions adapted under license by [facility] ([facility]). If you have questions about a medical condition or this instruction, always ask your healthcare professional. [address] any warranty or liability for your use of this information.

## 2023-02-22 NOTE — PROGRESS NOTES
[facility]) Physicians  Internal Medicine  Patient Encounter  Gwendolyn Barreto D.O., [facility] Annual Wellness Visit  Natacha Villanueva  2/22/2023    Subjective Eva Rene is here for Medicare AWV    He had his 27 year AA anniversary. Pt had several concerns today. Patient wanted to update me on his right thumb fracture and surgery  Patient also complaining of nocturia, urinary frequency. Urinary stream is okay we discussed his excessive coffee drinking. Patient also complaining of snoring and witnessed apnea. He denies any daytime fatigue or sleepiness. He denies any drowsiness while sleeping. Patient also requesting a Tdap because his daughter is having a baby.   His last Tdap was in January 2017    Medical/Surgical Histories     Past Medical History:   Diagnosis Date    Acquired spondylolisthesis of lumbosacral region 02/08/2019    Alcoholism ([facility] [address]     Anxiety     Basal cell cancer 08/2010    Dr. Bryan Perez    Cataracts, bilateral     Chronic bilateral low back pain without sciatica 02/08/2019    Closed displaced fracture of first metacarpal bone of right hand     DDD (degenerative disc disease), lumbar 02/08/2019    Depression     DVT, lower extremity ([address] 05/29/2015    Right, gastroc, soleal    Hyperlipidemia     Hypertension     Lumbar facet arthropathy 02/15/2019    Lumbar foraminal stenosis 02/15/2019    Osteopenia     Squamous cell skin cancer 2017    right ear lobe    Stasis dermatitis     Varicose veins of both lower extremities     Venous insufficiency of both lower extremities           Past Surgical History:   Procedure Laterality Date    ANESTHESIA NERVE BLOCK Bilateral 03/25/2019    BILATERAL L3,L4,L5 DR MEDIAL BRANCH BLOCKS WITH FLUOROSCOPY performed by Daniela Mckeon MD at [facility]      from [place] [address]  12/27/2022    ORIF right 1st metacarpal, Dr. Leonora Ingram Bilateral 03/04/2019    BILATERAL L3, L4, L5 DORSAL RAMUS LUMBAR MEDIAL BRANCH BLOCK WITH FLUOROSCOPY performed by Von Bob MD at [address] Bilateral 06/03/2019    BILATERAL L3,L4,L5 DORSAL RAMUS RADIOFRQUENCY ABLATION WITH FLUOROSCOPY performed by Von Bob MD at [address]  02/2019    Uvula lesion, papilloma, Dr. Decker Naval Left 03/25/2022    LEFT MAXILLARY ANTROSTOMY AND ANTERIOR ETHMOIDECTOMY performed by Dinah Hillman MD at [address]  08/2010    SKIN CANCER EXCISION Right 2017    [facility]. Medications/Allergies     Current Outpatient Medications   Medication Sig    omeprazole (PRILOSEC) 40 MG delayed release capsule TAKE 1 CAPSULE BY MOUTH EVERY MORNING BEFORE BREAKFAST    metoprolol succinate (TOPROL XL) 25 MG extended release tablet TAKE 1 TABLET BY MOUTH DAILY    fluticasone (FLONASE) 50 MCG/ACT nasal spray INSERT 1 SPRAY INTO THE NOSTRIL(S) EVERY DAY    clomiPRAMINE (ANAFRANIL) 75 MG capsule TAKE 2 CAPSULES BY MOUTH EVERY EVENING    fenofibrate (TRIGLIDE) 160 MG tablet TAKE 1 TABLET BY MOUTH EVERY DAY     No current facility-administered medications for this visit.         No Known Allergies      Substance Use History     Social History     Tobacco Use    Smoking status: Never    Smokeless tobacco: Never   Vaping Use    Vaping Use: Never used   Substance Use Topics    Alcohol use: No     Comment: Quit 11/3/1992    Drug use: No     Comment: 1992 Narcotic, Benzo, alcohol addition          Family History     Family History   Problem Relation Age of Onset    High Blood Pressure Mother     Heart Disease Mother         a fib    Heart Disease Father     Diabetes Father     Heart Attack Father             CARE TEAM  (Including outside providers/suppliers regularly involved in providing care):   Patient Care Team:  Ramy Atwood DO as PCP - [address] [place] [address], DO as PCP - Empaneled Provider  Shaun Luke MD (Vascular Surgery)  Von Bob MD as Consulting Physician (Pain Management)  Antonio Yang as Consulting Physician (Dermatology)  Umer Humphreys MD as Consulting Physician (Gastroenterology)  Brandon Masterson DO (Optometry)      Patient's complete Health Risk Assessment and screening values have been reviewed and are found in [address] [facility] [address]. The following risks were reviewed today and where indicated follow up appointments were made and/or referrals ordered. Positive Risk Factor Screenings with Interventions:  Fall Risk:  Do you feel unsteady or are you worried about falling? : (!) yes  2 or more falls in past year?: no  Fall with injury in past year?: (!) yes   Interventions:    See AVS for additional education material               Safety:  Do you have either shower bars, grab bars, non-slip mats or non-slip surfaces in your shower or bathtub?: (!) No  Interventions:  See AVS for additional education material         ROS:  GEN:  No fatigue, but does take naps. No drowsy driving. Drinks a lot of coffee. He drinks 1 pot per day. HEENT:  + Nasal congestion, PND. Saw Dr. Carlo Flores. He has constant throat clearing. Treated for chronic sinusitis. He has been Diagnosed with LRP. Laryngoscopy 11/14/2022--NL. He is on Flonase, Zyrtec. No runny nose. No sneezing. GI:  Had Esophagram-- Mild dysmotility, No reflux. MS:  Broke 1st metacarpal in multiple places. This was a crush injury. He was carrying a dog gate of basement stairs. He fell backwards and his right thumb got caught in the gate. Had ORIF12/27/2022  PULM:  + Cough. Cough more at night. No wheezing.  + Snoring. Witnessed apnea. : + Urinary frequency, + Nocturia 3 times per night. Stream is OK. Drinks coffee into the night time hours. OBJECTIVE     Vitals:    02/22/23 0750   BP: 132/72   Pulse: 73   Resp: 14   SpO2: 98%   Weight: 190 lb 3.2 oz (86.3 kg)   Height: 5' 9\" (1.753 m)     Body mass index is 28.09 kg/m².      Wt Readings from Last 3 Encounters: 02/22/23 190 lb 3.2 oz (86.3 kg)   11/14/22 189 lb (85.7 kg)   09/12/22 191 lb (86.6 kg)     BP Readings from Last 3 Encounters:   02/22/23 132/72   12/20/22 120/75   11/14/22 130/81          GEN:  67 y.o. male who is in NAD, A&O. He appears stated age and well nourished. He is cooperative and pleasant. HEAD:  NC/AT, no lesions. EYES:  ERUM, EOMI, No scleral icterus or conjunctival injection or discharge. Visual fields in tact to confrontation. EARS:  EAC's clear, TM's normal.  NOSE:  Nasal cavity is clear. No mucosal congestion or discharge. Sinuses are nontender. MOUTH & THROAT:  Oral cavity is clear without mucosal lesions. Tongue is midline. Dentition is in good repair. No pharyngeal erythema or exudate. NECK:  Supple. Full ROM. Trachea is midline. No increased JVD. No thyromegaly or nodules. No masses  LYMPH: No C/SC/A/F nodes  CARDIAC:  S1S2 NL. Regular rhythm. No murmur/clicks/rubs. No ectopy. PMI is non-displaced. VASC:  Pedal pulses 2/4. Carotid upstrokes 2+. No bruits noted. Bilateral lower extremity varicose veins, spider veins, dilated venules. PULM:  Lungs are CTA. Symmetric breath sounds noted. AP Diameter NL. GI:  Abdomen is soft and nontender. No distension. No organomegaly. No masses. No pulsatile masses. EXT:  No Cyanosis or clubbing. No edema. SKIN: Warm and dry, normal turgor, no rash or lesions of concern. NEURO:  Cranial nerves 2-12 are NL. Speech fluent and coherent. Strength is 5/5 in all muscle groups. No sensory deficits. No focal or lateralizing deficits. Reflexes 2/4 and symmetric. Gait is normal.  MS: Right thumb in a splint. PSYCH:  Mood and affect NL. Judgement and insight NL. Reviewed and updated this visit:  Tobacco  Allergies  Meds  Med Hx  Surg Hx  Soc Hx  Fam Hx        ASSESSMENT/PLAN     ASSESSMENT/PLAN:    1.  Medicare annual wellness visit, subsequent  All care gaps identified and addressed  Due for PSA  All vaccinations up-to-date  - Hemoglobin A1C; Future  - CBC with Auto Differential; Future  - Comprehensive Metabolic Panel; Future  - Lipid Panel; Future  - TSH; Future  - PSA Screening; Future  - Magnesium; Future    2. Snoring  This is a new complaint  Refer to sleep medicine  Patient and wife prefer Dr. Zoie Singh  - External Referral To Sleep Medicine    3. History of substance abuse [facility])  Patient is celebrating his 30-year anniversary in [place] and sober. 4. Major depressive disorder with single episode, in full remission ([address]  Well-controlled  Continue Anafranil  Patient did try to wean off and was unsuccessful. 5. Witnessed episode of apnea  Sleep study  - External Referral To Sleep Medicine    6. Nocturia  This is a new complaint  Referral to urology  Suspect etiology is drinking coffee very late into the evening hours just before bed  - Comprehensive Metabolic Panel; Future    7. Urinary frequency  Likely due to excessive coffee drinking  Recommend he decrease at least by 50% and limited coffee drinking in the evening  - Comprehensive Metabolic Panel; Future    8. Impaired fasting glucose  - Hemoglobin A1C; Future  - Comprehensive Metabolic Panel; Future    9. Excessive coffee consumption  As above. Counseled    10. Benign essential HTN    - CBC with Auto Differential; Future  - Comprehensive Metabolic Panel; Future  - Lipid Panel; Future  - TSH; Future    11. Screening for prostate cancer    - PSA Screening; Future    12. Screening for diabetes mellitus    - Hemoglobin A1C; Future  - Comprehensive Metabolic Panel; Future    13. Drug therapy    - Magnesium; Future        Recommendations for Preventive Services Due: see orders and patient instructions/AVS.  Recommended screening schedule for the next 5-10 years is provided to the patient in written form: see Patient Instructions/AVS.    Return for Medicare Annual Wellness Visit in 1 year.

## 2023-02-23 LAB
ESTIMATED AVERAGE GLUCOSE: 102.5 MG/DL
HBA1C MFR BLD: 5.2 %

## 2023-02-25 DIAGNOSIS — D64.9 ANEMIA, UNSPECIFIED TYPE: Primary | ICD-10-CM

## 2023-02-26 DIAGNOSIS — D64.9 NORMOCYTIC ANEMIA: Primary | ICD-10-CM

## 2023-03-02 ENCOUNTER — OFFICE VISIT (OUTPATIENT)
Dept: ENT CLINIC | Age: 73
End: 2023-03-02
Payer: MEDICARE

## 2023-03-02 VITALS
BODY MASS INDEX: 28.29 KG/M2 | WEIGHT: 191 LBS | DIASTOLIC BLOOD PRESSURE: 69 MMHG | SYSTOLIC BLOOD PRESSURE: 109 MMHG | HEART RATE: 90 BPM | TEMPERATURE: 97.3 F | HEIGHT: 69 IN

## 2023-03-02 DIAGNOSIS — K21.9 LARYNGOPHARYNGEAL REFLUX (LPR): Primary | ICD-10-CM

## 2023-03-02 DIAGNOSIS — J32.0 CHRONIC LEFT MAXILLARY SINUSITIS: ICD-10-CM

## 2023-03-02 PROCEDURE — 3078F DIAST BP <80 MM HG: CPT | Performed by: OTOLARYNGOLOGY

## 2023-03-02 PROCEDURE — 1036F TOBACCO NON-USER: CPT | Performed by: OTOLARYNGOLOGY

## 2023-03-02 PROCEDURE — G8427 DOCREV CUR MEDS BY ELIG CLIN: HCPCS | Performed by: OTOLARYNGOLOGY

## 2023-03-02 PROCEDURE — 99212 OFFICE O/P EST SF 10 MIN: CPT | Performed by: OTOLARYNGOLOGY

## 2023-03-02 PROCEDURE — G8484 FLU IMMUNIZE NO ADMIN: HCPCS | Performed by: OTOLARYNGOLOGY

## 2023-03-02 PROCEDURE — G8417 CALC BMI ABV UP PARAM F/U: HCPCS | Performed by: OTOLARYNGOLOGY

## 2023-03-02 PROCEDURE — 3017F COLORECTAL CA SCREEN DOC REV: CPT | Performed by: OTOLARYNGOLOGY

## 2023-03-02 PROCEDURE — 3074F SYST BP LT 130 MM HG: CPT | Performed by: OTOLARYNGOLOGY

## 2023-03-02 PROCEDURE — 1123F ACP DISCUSS/DSCN MKR DOCD: CPT | Performed by: OTOLARYNGOLOGY

## 2023-03-02 ASSESSMENT — ENCOUNTER SYMPTOMS
RHINORRHEA: 0
EYE PAIN: 0
SINUS PAIN: 0
SINUS PRESSURE: 0
EYE ITCHING: 0
EYE REDNESS: 0
NAUSEA: 0
COUGH: 0
TROUBLE SWALLOWING: 0
DIARRHEA: 0
CHOKING: 0
SHORTNESS OF BREATH: 0
VOICE CHANGE: 0
FACIAL SWELLING: 0
SORE THROAT: 0

## 2023-03-02 NOTE — PROGRESS NOTES
Subjective:      Patient ID: Little Amin is a 67 y.o. male. HPI  Chief Complaint   Patient presents with    throat clearing. History of Present Illness  Head/Neck    Rakel Wheatley is a(n) 67 y.o. male who presents with continued throat clearing. Maybe better on omeprazole. Can tolerate it if this is the best it is per patient. No nasal obstruction. No GERD.      Patient Active Problem List   Diagnosis    Dyslipidemia    Hypotestosteronism    Anxiety    Osteopenia    ED (erectile dysfunction)    Major depression    Swelling    Varicose veins of both lower extremities    Stasis dermatitis    Venous insufficiency of both lower extremities    DDD (degenerative disc disease), lumbar    Acquired spondylolisthesis of lumbosacral region    Chronic bilateral low back pain without sciatica    Spinal stenosis of lumbar region without neurogenic claudication    Lumbar foraminal stenosis    Lumbar facet arthropathy    Palatal lesion    Benign essential HTN    Orthostatic hypotension    Orthostatic hypotension dysautonomic syndrome    Major depressive disorder with single episode, in full remission ([address]    History of substance abuse ([address]    Cataracts, bilateral     Past Surgical History:   Procedure Laterality Date    ANESTHESIA NERVE BLOCK Bilateral 03/25/2019    BILATERAL L3,L4,L5 DR MEDIAL BRANCH BLOCKS WITH FLUOROSCOPY performed by America Padilla MD at [address]      from [facility]    HAND SURGERY  12/27/2022    ORIF right 1st metacarpal, Dr. Hussein Gonzalez Bilateral 03/04/2019    BILATERAL L3, L4, L5 DORSAL RAMUS LUMBAR MEDIAL BRANCH BLOCK WITH FLUOROSCOPY performed by America Padilla MD at [place] [address] Bilateral 06/03/2019    BILATERAL L3,L4,L5 DORSAL RAMUS RADIOFRQUENCY ABLATION WITH FLUOROSCOPY performed by America Padilla MD at [address] [place] [address]  02/2019    Uvula lesion, papilloma, Dr. Carol Pinzon Left 03/25/2022    LEFT MAXILLARY ANTROSTOMY AND ANTERIOR ETHMOIDECTOMY performed by Courtney Fowler MD at [facility]  08/2010    SKIN CANCER EXCISION Right 2017    [facility]. Family History   Problem Relation Age of Onset    High Blood Pressure Mother     Heart Disease Mother         a fib    Heart Disease Father     Diabetes Father     Heart Attack Father      Social History     Socioeconomic History    Marital status:      Spouse name: Not on file    Number of children: Not on file    Years of education: Not on file    Highest education level: Not on file   Occupational History    Not on file   Tobacco Use    Smoking status: Never    Smokeless tobacco: Never   Vaping Use    Vaping Use: Never used   Substance and Sexual Activity    Alcohol use: No     Comment: Quit 11/3/1992    Drug use: No     Comment: 1992 Narcotic, Benzo, alcohol addition    Sexual activity: Yes     Partners: Female   Other Topics Concern    Not on file   Social History Narrative    Not on file     Social Determinants of Health     Financial Resource Strain: Low Risk     Difficulty of Paying Living Expenses: Not hard at all   Food Insecurity: No Food Insecurity    Worried About [employer] [employer] in the Last Year: Never true    [address] [employer]  [place] in the Last Year: Never true   Transportation Needs: Unknown    Lack of Transportation (Medical): Not on file    Lack of Transportation (Non-Medical): No   Physical Activity: Sufficiently Active    Days of Exercise per Week: 2 days    Minutes of Exercise per Session: 140 min   Stress: Not on file   Social Connections: Not on file   Intimate Partner Violence: Not on file   Housing Stability: Unknown    Unable to Pay for Housing in the Last Year: Not on file    Number of Places Lived in the Last Year: Not on file    Unstable Housing in the Last Year: No       DRUG/FOOD ALLERGIES: Patient has no known allergies.     CURRENT MEDICATIONS  Prior to Admission medications    Medication Sig Start Date End Date Taking? Authorizing Provider   omeprazole (PRILOSEC) 40 MG delayed release capsule TAKE 1 CAPSULE BY MOUTH EVERY MORNING BEFORE BREAKFAST 12/19/22  Yes Rodolfo Jacob MD   metoprolol succinate (TOPROL XL) 25 MG extended release tablet TAKE 1 TABLET BY MOUTH DAILY 10/18/22  Yes Keith Valenzuela DO   fluticasone (FLONASE) 50 MCG/ACT nasal spray INSERT 1 SPRAY INTO THE NOSTRIL(S) EVERY DAY 10/17/22  Yes Rodolfo Jacob MD   clomiPRAMINE (ANAFRANIL) 75 MG capsule TAKE 2 CAPSULES BY MOUTH EVERY EVENING 9/11/22  Yes Keith Valenzuela DO   fenofibrate (TRIGLIDE) 160 MG tablet TAKE 1 TABLET BY MOUTH EVERY DAY 5/19/22  Yes Keith Valenzuela DO       Lab Studies:  Lab Results   Component Value Date    WBC 9.6 02/22/2023    HGB 11.9 (L) 02/22/2023    HCT 35.7 (L) 02/22/2023    MCV 96.8 02/22/2023     02/22/2023     Lab Results   Component Value Date    GLUCOSE 99 02/22/2023    BUN 21 (H) 02/22/2023    CREATININE 1.1 02/22/2023    K 4.8 02/22/2023     02/22/2023     02/22/2023    CALCIUM 9.3 02/22/2023     Lab Results   Component Value Date    MG 2.10 02/22/2023     No results found for: PHOS  Lab Results   Component Value Date    ALKPHOS 77 02/22/2023    ALT 14 02/22/2023    AST 20 02/22/2023    BILITOT <0.2 02/22/2023    PROT 7.0 02/22/2023        Review of Systems   Constitutional:  Negative for activity change, appetite change, chills, fatigue and fever. HENT:  Negative for congestion, ear discharge, ear pain, facial swelling, hearing loss, nosebleeds, postnasal drip, rhinorrhea, sinus pressure, sinus pain, sneezing, sore throat, tinnitus, trouble swallowing and voice change. Eyes:  Negative for pain, redness, itching and visual disturbance. Respiratory:  Negative for cough, choking and shortness of breath. Gastrointestinal:  Negative for diarrhea and nausea. Endocrine: Negative for cold intolerance and heat intolerance.      Objective:   Physical Exam  Constitutional:       Appearance: He is well-developed. HENT:      Head: Not macrocephalic and not microcephalic. No Pak's sign, abrasion, right periorbital erythema, left periorbital erythema or laceration. Nose: No nasal deformity, septal deviation, laceration, mucosal edema or rhinorrhea. Right Nostril: No epistaxis or occlusion. Left Nostril: No epistaxis or occlusion. Right Turbinates: Not enlarged, swollen or pale. Left Turbinates: Not enlarged, swollen or pale. Right Sinus: No maxillary sinus tenderness or frontal sinus tenderness. Left Sinus: No maxillary sinus tenderness or frontal sinus tenderness. Mouth/Throat:      Lips: No lesions. Mouth: Mucous membranes are moist.      Tongue: No lesions. Palate: No mass. Pharynx: Uvula midline. No oropharyngeal exudate or posterior oropharyngeal erythema. Tonsils: No tonsillar abscesses. Eyes:      General:         Right eye: No discharge. Left eye: No discharge. Extraocular Movements:      Right eye: Normal extraocular motion. Left eye: Normal extraocular motion. Conjunctiva/sclera:      Right eye: Right conjunctiva is not injected. No chemosis or exudate. Left eye: Left conjunctiva is not injected. No chemosis or exudate. Neck:      Thyroid: No thyroid mass or thyromegaly. Cardiovascular:      Rate and Rhythm: Normal rate and regular rhythm. Pulmonary:      Effort: No tachypnea or respiratory distress. Lymphadenopathy:      Head:      Right side of head: No preauricular or posterior auricular adenopathy. Left side of head: No preauricular or posterior auricular adenopathy. Cervical:      Right cervical: No superficial, deep or posterior cervical adenopathy. Left cervical: No superficial, deep or posterior cervical adenopathy. Neurological:      Mental Status: He is alert and oriented to person, place, and time.        Assessment: Diagnosis Orders   1. Laryngopharyngeal reflux (LPR)        2. Chronic left maxillary sinusitis                  Plan:      Stop omeprazole. Begin BID sinus rinses. Follow up as needed.          Justyna Soto MD

## 2023-03-08 ENCOUNTER — NURSE ONLY (OUTPATIENT)
Dept: INTERNAL MEDICINE CLINIC | Age: 73
End: 2023-03-08
Payer: MEDICARE

## 2023-03-08 DIAGNOSIS — F41.9 ANXIETY: ICD-10-CM

## 2023-03-08 DIAGNOSIS — F32.5 MAJOR DEPRESSIVE DISORDER WITH SINGLE EPISODE, IN FULL REMISSION (HCC): ICD-10-CM

## 2023-03-08 DIAGNOSIS — F42.8 OBSESSIVE THINKING: ICD-10-CM

## 2023-03-08 DIAGNOSIS — Z12.11 COLON CANCER SCREENING: Primary | ICD-10-CM

## 2023-03-08 LAB
CONTROL: NORMAL
HEMOCCULT STL QL: NEGATIVE

## 2023-03-08 PROCEDURE — 82274 ASSAY TEST FOR BLOOD FECAL: CPT | Performed by: INTERNAL MEDICINE

## 2023-03-08 RX ORDER — CLOMIPRAMINE HYDROCHLORIDE 75 MG/1
CAPSULE ORAL
Qty: 180 CAPSULE | Refills: 1 | Status: SHIPPED | OUTPATIENT
Start: 2023-03-08

## 2023-03-13 ENCOUNTER — OFFICE VISIT (OUTPATIENT)
Dept: PULMONOLOGY | Age: 73
End: 2023-03-13
Payer: MEDICARE

## 2023-03-13 VITALS
RESPIRATION RATE: 16 BRPM | BODY MASS INDEX: 28.29 KG/M2 | SYSTOLIC BLOOD PRESSURE: 104 MMHG | HEART RATE: 83 BPM | TEMPERATURE: 97 F | HEIGHT: 69 IN | OXYGEN SATURATION: 97 % | DIASTOLIC BLOOD PRESSURE: 62 MMHG | WEIGHT: 191 LBS

## 2023-03-13 DIAGNOSIS — G47.33 OSA (OBSTRUCTIVE SLEEP APNEA): Primary | ICD-10-CM

## 2023-03-13 DIAGNOSIS — G47.52 RBD (REM BEHAVIORAL DISORDER): ICD-10-CM

## 2023-03-13 DIAGNOSIS — F32.5 MAJOR DEPRESSIVE DISORDER WITH SINGLE EPISODE, IN FULL REMISSION (HCC): ICD-10-CM

## 2023-03-13 PROCEDURE — G8417 CALC BMI ABV UP PARAM F/U: HCPCS | Performed by: INTERNAL MEDICINE

## 2023-03-13 PROCEDURE — G8484 FLU IMMUNIZE NO ADMIN: HCPCS | Performed by: INTERNAL MEDICINE

## 2023-03-13 PROCEDURE — 1036F TOBACCO NON-USER: CPT | Performed by: INTERNAL MEDICINE

## 2023-03-13 PROCEDURE — 1123F ACP DISCUSS/DSCN MKR DOCD: CPT | Performed by: INTERNAL MEDICINE

## 2023-03-13 PROCEDURE — 3074F SYST BP LT 130 MM HG: CPT | Performed by: INTERNAL MEDICINE

## 2023-03-13 PROCEDURE — 3017F COLORECTAL CA SCREEN DOC REV: CPT | Performed by: INTERNAL MEDICINE

## 2023-03-13 PROCEDURE — 3078F DIAST BP <80 MM HG: CPT | Performed by: INTERNAL MEDICINE

## 2023-03-13 PROCEDURE — G8427 DOCREV CUR MEDS BY ELIG CLIN: HCPCS | Performed by: INTERNAL MEDICINE

## 2023-03-13 PROCEDURE — 99204 OFFICE O/P NEW MOD 45 MIN: CPT | Performed by: INTERNAL MEDICINE

## 2023-03-13 ASSESSMENT — SLEEP AND FATIGUE QUESTIONNAIRES
HOW LIKELY ARE YOU TO NOD OFF OR FALL ASLEEP IN A CAR, WHILE STOPPED FOR A FEW MINUTES IN TRAFFIC: 0
NECK CIRCUMFERENCE (INCHES): 16
ESS TOTAL SCORE: 8
HOW LIKELY ARE YOU TO NOD OFF OR FALL ASLEEP WHILE WATCHING TV: 1
HOW LIKELY ARE YOU TO NOD OFF OR FALL ASLEEP WHILE SITTING INACTIVE IN A PUBLIC PLACE: 0
HOW LIKELY ARE YOU TO NOD OFF OR FALL ASLEEP WHILE LYING DOWN TO REST IN THE AFTERNOON WHEN CIRCUMSTANCES PERMIT: 3
HOW LIKELY ARE YOU TO NOD OFF OR FALL ASLEEP WHILE SITTING AND TALKING TO SOMEONE: 0
HOW LIKELY ARE YOU TO NOD OFF OR FALL ASLEEP WHILE SITTING AND READING: 1
HOW LIKELY ARE YOU TO NOD OFF OR FALL ASLEEP WHEN YOU ARE A PASSENGER IN A CAR FOR AN HOUR WITHOUT A BREAK: 3
HOW LIKELY ARE YOU TO NOD OFF OR FALL ASLEEP WHILE SITTING QUIETLY AFTER LUNCH WITHOUT ALCOHOL: 0

## 2023-03-13 NOTE — LETTER
March 13, 2023       Minus Fabian Rene YOB: 1950   [place] [id number] Date of Visit:  3/13/2023     3/13/23        Minus Fabian Rene      I have seen this patient in the office today and wanted to communicate my findings and recommendations. Patient Instructions         ASSESSMENT/PLAN:   Diagnosis Orders   1. HOMER (obstructive sleep apnea)  Baseline Diagnostic Sleep Study      2. Major depressive disorder with single episode, in full remission ([facility] [address]  Baseline Diagnostic Sleep Study      3. RBD (REM behavioral disorder)  Baseline Diagnostic Sleep Study              I  RECOMMENDATIONS:     he will be scheduled for polysomnography in order to evaluate for the presence and severity of obstructive sleep apnea. He was given a discussion of the pathophysiology, evaluation and treatment of apnea. Thyroid function tests are recommended if not done recently. Advised to avoid driving when too sleepy to function safely and given a discussion of the risks of untreated apnea such as accidents, cognitive impairment, mood impairment, high blood pressure, various cardiac diseases and stroke. Weight loss was encouraged.      Sleep Medicine 3/13/2023   Sitting and reading 1   Watching TV 1   Sitting, inactive in a public place (e.g. a theatre or a meeting) 0   As a passenger in a car for an hour without a break 3   Lying down to rest in the afternoon when circumstances permit 3   Sitting and talking to someone 0   Sitting quietly after a lunch without alcohol 0   In a car, while stopped for a few minutes in traffic 0   [place] Sleepiness Score 8   Neck circumference (Inches) 16       ESS is 8/24    Neck circumference (Inches): 16  Mallampati class 3        Will get Sleep study  I will call you with results        RBD  Will consider therapy later    May consider starting with melatonin        Depression  Continue with  Clomipramine 75 mg 2 tablets at night  Has been on this for years Thank you for allowing me to assist in the care of the MD Laz Lay MD

## 2023-03-13 NOTE — PROGRESS NOTES
MA Communication:   The following orders are received by verbal communication from Carmen Oliver MD    Orders include:  PSG at [facility] (they will call pt)       31-90 day fu scheduled 8/22/23

## 2023-03-13 NOTE — PATIENT INSTRUCTIONS
ASSESSMENT/PLAN:   Diagnosis Orders   1. HOMER (obstructive sleep apnea)  Baseline Diagnostic Sleep Study      2. Major depressive disorder with single episode, in full remission (HCC)  Baseline Diagnostic Sleep Study      3. RBD (REM behavioral disorder)  Baseline Diagnostic Sleep Study              I  RECOMMENDATIONS:     he will be scheduled for polysomnography in order to evaluate for the presence and severity of obstructive sleep apnea. He was given a discussion of the pathophysiology, evaluation and treatment of apnea. Thyroid function tests are recommended if not done recently.     Advised to avoid driving when too sleepy to function safely and given a discussion of the risks of untreated apnea such as accidents, cognitive impairment, mood impairment, high blood pressure, various cardiac diseases and stroke. Weight loss was encouraged.     Sleep Medicine 3/13/2023   Sitting and reading 1   Watching TV 1   Sitting, inactive in a public place (e.g. a theatre or a meeting) 0   As a passenger in a car for an hour without a break 3   Lying down to rest in the afternoon when circumstances permit 3   Sitting and talking to someone 0   Sitting quietly after a lunch without alcohol 0   In a car, while stopped for a few minutes in traffic 0   [place] Sleepiness Score 8   Neck circumference (Inches) 16       ESS is 8/24    Neck circumference (Inches): 16  Mallampati class 3        Will get Sleep study  I will call you with results        RBD  Will consider therapy later    May consider starting with melatonin        Depression  Continue with  Clomipramine 75 mg 2 tablets at night  Has been on this for years    Remember to bring a list of pulmonary medications and any CPAP or BiPAP machines to your next appointment with the office.     Please keep all of your future appointments scheduled by [facility] office. Out of respect for other patients and providers, you may be asked to  reschedule your appointment if you arrive later than your scheduled appointment time. Appointments cancelled less than 24hrs in advance will be considered a no show. Patients with three missed appointments within 1 year or four missed appointments within 2 years can be dismissed from the practice. Please be aware that our physicians are required to work in the Intensive Care Unit at [facility].  Your appointment may need to be rescheduled if they are designated to work during your appointment time. You may receive a survey regarding the care you received during your visit. Your input is valuable to us. We encourage you to complete and return your survey. We hope you will choose us in the future for your healthcare needs. Pt instructed of all future appointment dates & times, including radiology, labs, procedures & referrals. If procedures were scheduled preparation instructions provided. Instructions on future appointments with [facility] were given.

## 2023-03-13 NOTE — PROGRESS NOTES
Quinton Rene (: 1950 ) is a 67 y.o. male here for an evaluation of   Chief Complaint   Patient presents with    Sleep Apnea         ASSESSMENT/PLAN:   Diagnosis Orders   1. HOMER (obstructive sleep apnea)  Baseline Diagnostic Sleep Study      2. Major depressive disorder with single episode, in full [facility]  Baseline Diagnostic Sleep Study      3. RBD (REM behavioral disorder)  Baseline Diagnostic Sleep Study              I  RECOMMENDATIONS:     he will be scheduled for polysomnography in order to evaluate for the presence and severity of obstructive sleep apnea. He was given a discussion of the pathophysiology, evaluation and treatment of apnea. Thyroid function tests are recommended if not done recently. Advised to avoid driving when too sleepy to function safely and given a discussion of the risks of untreated apnea such as accidents, cognitive impairment, mood impairment, high blood pressure, various cardiac diseases and stroke. Weight loss was encouraged. Sleep Medicine 3/13/2023   Sitting and reading 1   Watching TV 1   Sitting, inactive in a public place (e.g. a theatre or a meeting) 0   As a passenger in a car for an hour without a break 3   Lying down to rest in the afternoon when circumstances permit 3   Sitting and talking to someone 0   Sitting quietly after a lunch without alcohol 0   In a car, while stopped for a few minutes in traffic 0   [place] Sleepiness Score 8   Neck circumference (Inches) 16       ESS is 8/24    Neck circumference (Inches): 16  Mallampati class 3        Will get Sleep study  I will call you with results        RBD  Long term, going on for many years. Will consider therapy later    May consider starting with melatonin        Depression  Continue with  Clomipramine 75 mg 2 tablets at night  Has been on this for years      No follow-ups on file.        SUBJECTIVE/OBJECTIVE:    Consult from Dr. Shoshana Sagastume  For sleep issues  Initially seen 3/13/23      Subjective:              67old year old,male, with PMH significant for depression,  that presents today for initial evaluation. Pt reports snoring and apnea for years  and that it is getting worse. Pt reports does snore severe for years. Patient's partner does complain of pt snoring. Pt's partner does notice that he stops breathing during sleep. Pt's  does wake   himself with nocturia x4, GERD, . Pt does report fatigue or tiredness frequently. Pt sleeps more than 7 hours, and at rare overwhelming sleepiness attacks. Pt  does dozes unintentionally while watching TV. While driving  does not feel drowsy / nod off / fall sleep at stop lights. Pt does not have h/o sleepiness associated wrecks/near wrecks. Pt does not nod off while  unattended. Pt does not report having restless legs 0 times a week. This is often accompanied by leg jerks during sleep, numbness in legs or feet, aches/burning/cramps in legs, feet. does report having nasal congestion. Postive  for use of  nose or sinus surgery. negative for broken nose, tonsillectomy, sleeping w/ chest raised. Does not sleep with oxygen. Pt does have a dental appliance or braces on teeth. does teeth grinding. Does report nightmares, sleep talking, dreaming during naps. When angry or laughing Tanya Levine does not report cataplexy. he does not report hallucinations when dozing off or immediately upon awakening. Does not report sleep paralysis. does have parasomnia. Having RBD, acting out at night         Patient's [place] Sleepiness score  is required. Patient  is CONSISTENT with moderate daytime sleepiness.                 Review of Systems      Vitals:    03/13/23 0919   BP: 104/62   Site: Left Upper Arm   Position: Sitting   Cuff Size: Medium Adult   Pulse: 83   Resp: 16   Temp: 97 °F (36.1 °C)   TempSrc: Temporal   SpO2: 97%   Weight: 191 lb (86.6 kg)   Height: 5' 9\" (1.753 m)        Physical Exam         Quentin De Luna MD

## 2023-03-14 ENCOUNTER — TELEPHONE (OUTPATIENT)
Dept: SLEEP CENTER | Age: 73
End: 2023-03-14

## 2023-03-14 NOTE — TELEPHONE ENCOUNTER
Called to schedule a PSG per [facility] for the pt to return my call     Medicare insurance

## 2023-03-24 DIAGNOSIS — D64.9 NORMOCYTIC ANEMIA: ICD-10-CM

## 2023-03-24 LAB
BASOPHILS # BLD: 0.1 K/UL (ref 0–0.2)
BASOPHILS NFR BLD: 0.7 %
DEPRECATED RDW RBC AUTO: 14 % (ref 12.4–15.4)
EOSINOPHIL # BLD: 0.5 K/UL (ref 0–0.6)
EOSINOPHIL NFR BLD: 6.3 %
FERRITIN SERPL IA-MCNC: 218.1 NG/ML (ref 30–400)
FOLATE SERPL-MCNC: 14.75 NG/ML (ref 4.78–24.2)
HCT VFR BLD AUTO: 35.8 % (ref 40.5–52.5)
HGB BLD-MCNC: 11.8 G/DL (ref 13.5–17.5)
IRON SATN MFR SERPL: 24 % (ref 20–50)
IRON SERPL-MCNC: 78 UG/DL (ref 59–158)
LYMPHOCYTES # BLD: 2 K/UL (ref 1–5.1)
LYMPHOCYTES NFR BLD: 27.2 %
MCH RBC QN AUTO: 32.4 PG (ref 26–34)
MCHC RBC AUTO-ENTMCNC: 32.9 G/DL (ref 31–36)
MCV RBC AUTO: 98.3 FL (ref 80–100)
MONOCYTES # BLD: 0.6 K/UL (ref 0–1.3)
MONOCYTES NFR BLD: 7.8 %
NEUTROPHILS # BLD: 4.3 K/UL (ref 1.7–7.7)
NEUTROPHILS NFR BLD: 58 %
PLATELET # BLD AUTO: 301 K/UL (ref 135–450)
PMV BLD AUTO: 7.9 FL (ref 5–10.5)
RBC # BLD AUTO: 3.64 M/UL (ref 4.2–5.9)
TIBC SERPL-MCNC: 326 UG/DL (ref 260–445)
VIT B12 SERPL-MCNC: 673 PG/ML (ref 211–911)
WBC # BLD AUTO: 7.4 K/UL (ref 4–11)

## 2023-04-03 ENCOUNTER — TELEPHONE (OUTPATIENT)
Dept: INTERNAL MEDICINE CLINIC | Age: 73
End: 2023-04-03

## 2023-04-03 DIAGNOSIS — D64.9 ANEMIA, UNSPECIFIED TYPE: ICD-10-CM

## 2023-04-03 DIAGNOSIS — Z12.11 COLON CANCER SCREENING: Primary | ICD-10-CM

## 2023-04-03 NOTE — TELEPHONE ENCOUNTER
Referral placed     [facility], [address] [place] [address], [address] [place] [address], [address] [place] [address]  Phone: [phone]  Fax: [phone]

## 2023-04-03 NOTE — TELEPHONE ENCOUNTER
----- Message from Celeste Shah sent at 4/3/2023 12:28 PM EDT -----  Subject: Referral Request    Reason for referral request? Pt would like a referral to a GI Dr so he can   schedule for a colonoscopy. Pt would like to go to whomever Dr Huey Otoole   recommends. Provider patient wants to be referred to(if known):     Provider Phone Number(if known):     Additional Information for Provider?   ---------------------------------------------------------------------------  --------------  [address] [employer]    [phone]; OK to respond with electronic message via [employer] portal (only   for patients who have registered [employer] account)  ---------------------------------------------------------------------------  --------------

## 2023-04-06 ENCOUNTER — HOSPITAL ENCOUNTER (OUTPATIENT)
Dept: SLEEP CENTER | Age: 73
Discharge: HOME | End: 2023-04-06
Payer: MEDICARE

## 2023-04-06 DIAGNOSIS — G47.52 RBD (REM BEHAVIORAL DISORDER): ICD-10-CM

## 2023-04-06 DIAGNOSIS — F32.5 MAJOR DEPRESSIVE DISORDER WITH SINGLE EPISODE, IN FULL REMISSION (HCC): ICD-10-CM

## 2023-04-06 DIAGNOSIS — G47.33 OSA (OBSTRUCTIVE SLEEP APNEA): ICD-10-CM

## 2023-04-06 PROCEDURE — 95810 POLYSOM 6/> YRS 4/> PARAM: CPT

## 2023-04-12 PROBLEM — G47.33 OSA (OBSTRUCTIVE SLEEP APNEA): Status: ACTIVE | Noted: 2023-04-12

## 2023-05-03 ENCOUNTER — TELEPHONE (OUTPATIENT)
Dept: PULMONOLOGY | Age: 73
End: 2023-05-03

## 2023-05-03 DIAGNOSIS — G47.33 OSA (OBSTRUCTIVE SLEEP APNEA): Primary | ICD-10-CM

## 2023-05-03 NOTE — TELEPHONE ENCOUNTER
Patient is calling asking if Dr. Jewels Montiel could prescribe something for him for his sleep study he is doing tomorrow night. His previous one he did not sleep very well. If there is something that will not interfere with the study, he would like to take it. Please call patient and advise.

## 2023-05-04 ENCOUNTER — HOSPITAL ENCOUNTER (OUTPATIENT)
Dept: SLEEP CENTER | Age: 73
Discharge: HOME | End: 2023-05-04
Payer: MEDICARE

## 2023-05-04 DIAGNOSIS — G47.52 RBD (REM BEHAVIORAL DISORDER): ICD-10-CM

## 2023-05-04 DIAGNOSIS — G47.33 OSA (OBSTRUCTIVE SLEEP APNEA): ICD-10-CM

## 2023-05-04 PROCEDURE — 95811 POLYSOM 6/>YRS CPAP 4/> PARM: CPT

## 2023-05-04 RX ORDER — ZOLPIDEM TARTRATE 10 MG/1
10 TABLET ORAL NIGHTLY PRN
Qty: 3 TABLET | Refills: 0 | Status: SHIPPED | OUTPATIENT
Start: 2023-05-04 | End: 2023-05-07

## 2023-05-06 ENCOUNTER — TELEPHONE (OUTPATIENT)
Dept: PULMONOLOGY | Age: 73
End: 2023-05-06

## 2023-05-06 NOTE — TELEPHONE ENCOUNTER
data   Tidal volume      Tigger                                     Rate                                         Inspiratory time                                                         The following equipment is Medically Necessary for the above stated patient. It is reasonable and necessary in reference to acceptable standards of medical practice for this condition, and is not prescribed as a convenience. Frequency of Use:    Daily                 Length of Need: 13 Months              o The patient requires BiLevel PAP and the following apply: []  The patient requires a Respiratory Assist Device (RAD) and the following apply:   o CPAP was tried and failed to meet therapeutic goals. [] CPAP was tried, but failed to meet therapeutic goals   o The prescribed mask interface has been properly fit, is the most comfortable to the patient and will be used with the BPAP device. [] The prescribed mask interface has been properly fit, is the most comfortable to the patient and will be used with the RAD.   o Current CPAP setting prevents patient from tolerating the therapy and lower CPAP settings fail to adequately control the symptoms of HOMER, improve sleep quality, or reduce AHI to acceptable levels. [] Current CPAP setting prevent patient from tolerating the therapy and lower PAP settings fail to  adequately control HOMER symptoms, improve sleep quality, or reduce AHI to acceptable levels.          [] There is significant improvement of the respiratory events on the RAD                                                                                                                                                                                                                                  MD KELLY Millard- [phone]     KOTKA- [phone]                    05/06/23       ____________________________                        _______________________           Physician Signature

## 2023-05-08 ENCOUNTER — TELEPHONE (OUTPATIENT)
Dept: PULMONOLOGY | Age: 73
End: 2023-05-08

## 2023-05-08 DIAGNOSIS — G47.33 OSA (OBSTRUCTIVE SLEEP APNEA): Primary | ICD-10-CM

## 2023-05-08 NOTE — TELEPHONE ENCOUNTER
Pt called recently had sleep study done at [facility] and received a text to  supplies for CPAP. Pt is not interested in Cpap equipment and is wanting Inspire only. Please call PT and advise him on what he needs to do.

## 2023-05-12 NOTE — TELEPHONE ENCOUNTER
I was able to talk to the patient about the sleep study and his wishes.   He has tried CPAP in the past and was not comfortable with it  His wife also was uncomfortable with it and so he was not really interested in pursuing a CPAP therapy  Patient would like another alternative and was interested in the inspire  We will go ahead and send to Dr. Rahman/Dr. Jeny Pendleton for evaluation and work-up for inspire  Patient is understanding of the process  We will go ahead and give him the phone number to Dr. Rahman/Dr. Brenda Aly office to make the further appointment  Once he gets implanted I will take over the care of the inspire

## 2023-05-12 NOTE — TELEPHONE ENCOUNTER
Pt. Calling back again and wanting a call back . Wants Inspire not Cpap that you ordered. Called on 5/8 and has not heard anything back. Would please like a call back from you or MA today.

## 2023-05-17 NOTE — PROGRESS NOTES
[place]      Patient Name: [facility]  Medical Record Number:  [id number]  Primary Care Physician:  Yessi Melgoza DO  Date of Consultation: 5/19/2023    Chief Complaint:   Chief Complaint   Patient presents with    New Patient     Here for Inspire consult      HISTORY OF PRESENT ILLNESS  Ariana De La Gazra is a(n) 67 y.o. male who presents for evaluation of obstructive sleep apnea. He is a patient of Dr. Franklin Mccain. He had a cystoscopy performed on April 6, 2023 which showed sleep apnea. He has not tried the CPAP due to the fact that his wife had a CPAP and could not tolerate it and they did not like the noise made at night nor did like the inconvenience of it. He is not interested in trying the CPAP at this point time.     Patient Active Problem List   Diagnosis    Dyslipidemia    Hypotestosteronism    Anxiety    Osteopenia    ED (erectile dysfunction)    Major depression    Swelling    Varicose veins of both lower extremities    Stasis dermatitis    Venous insufficiency of both lower extremities    DDD (degenerative disc disease), lumbar    Acquired spondylolisthesis of lumbosacral region    Chronic bilateral low back pain without sciatica    Spinal stenosis of lumbar region without neurogenic claudication    Lumbar foraminal stenosis    Lumbar facet arthropathy    Palatal lesion    Benign essential HTN    Orthostatic hypotension    Orthostatic hypotension dysautonomic syndrome    Major depressive disorder with single episode, in full remission ([address]    History of substance abuse ([address]    Cataracts, bilateral    HOMER (obstructive sleep apnea)     Past Surgical History:   Procedure Laterality Date    ANESTHESIA NERVE BLOCK Bilateral 03/25/2019    BILATERAL L3,L4,L5 DR MEDIAL BRANCH BLOCKS WITH FLUOROSCOPY performed by Rita Abernathy MD at [address]      from [facility]    HAND SURGERY  12/27/2022    ORIF right 1st metacarpal,

## 2023-05-18 RX ORDER — FENOFIBRATE 160 MG/1
TABLET ORAL
Qty: 90 TABLET | Refills: 3 | Status: SHIPPED | OUTPATIENT
Start: 2023-05-18

## 2023-05-18 NOTE — TELEPHONE ENCOUNTER
Medication:   Requested Prescriptions     Pending Prescriptions Disp Refills    fenofibrate (TRIGLIDE) 160 MG tablet [Pharmacy Med Name: FENOFIBRATE 160MG TABLETS] 90 tablet 3     Sig: TAKE 1 TABLET BY MOUTH EVERY DAY     Last Filled:  #90 with 3 refills on 5/19/22    Last appt: 2/22/2023   Next appt: 8/22/2023    Last OARRS: No flowsheet data found.       2/22/23 0856 8/8/22 0915 2/21/22 0841 8/12/21 0824 2/17/21 0854 5/27/20 1628 2/10/20 0902    Cholesterol, Total 0 - 199 mg/dL 149  135  157  123  146  144  139    Triglycerides 0 - 150 mg/dL 94  81  94  96  138  153 High   77    HDL 40 - 60 mg/dL 47  48  54  52  46  41  52

## 2023-05-19 ENCOUNTER — OFFICE VISIT (OUTPATIENT)
Dept: ENT CLINIC | Age: 73
End: 2023-05-19
Payer: MEDICARE

## 2023-05-19 VITALS — RESPIRATION RATE: 16 BRPM | BODY MASS INDEX: 27.85 KG/M2 | WEIGHT: 188 LBS | HEIGHT: 69 IN | TEMPERATURE: 97.9 F

## 2023-05-19 DIAGNOSIS — G47.33 OBSTRUCTIVE SLEEP APNEA: Primary | ICD-10-CM

## 2023-05-19 PROCEDURE — G8417 CALC BMI ABV UP PARAM F/U: HCPCS | Performed by: STUDENT IN AN ORGANIZED HEALTH CARE EDUCATION/TRAINING PROGRAM

## 2023-05-19 PROCEDURE — 1036F TOBACCO NON-USER: CPT | Performed by: STUDENT IN AN ORGANIZED HEALTH CARE EDUCATION/TRAINING PROGRAM

## 2023-05-19 PROCEDURE — 99204 OFFICE O/P NEW MOD 45 MIN: CPT | Performed by: STUDENT IN AN ORGANIZED HEALTH CARE EDUCATION/TRAINING PROGRAM

## 2023-05-19 PROCEDURE — 3017F COLORECTAL CA SCREEN DOC REV: CPT | Performed by: STUDENT IN AN ORGANIZED HEALTH CARE EDUCATION/TRAINING PROGRAM

## 2023-05-19 PROCEDURE — 1123F ACP DISCUSS/DSCN MKR DOCD: CPT | Performed by: STUDENT IN AN ORGANIZED HEALTH CARE EDUCATION/TRAINING PROGRAM

## 2023-05-19 PROCEDURE — G8427 DOCREV CUR MEDS BY ELIG CLIN: HCPCS | Performed by: STUDENT IN AN ORGANIZED HEALTH CARE EDUCATION/TRAINING PROGRAM

## 2023-05-19 ASSESSMENT — ENCOUNTER SYMPTOMS
EYE PAIN: 0
NAUSEA: 0
RHINORRHEA: 0
SHORTNESS OF BREATH: 0
VOMITING: 0
COUGH: 0

## 2023-06-05 DIAGNOSIS — J34.3 HYPERTROPHY OF NASAL TURBINATES: ICD-10-CM

## 2023-06-05 DIAGNOSIS — J32.0 CHRONIC LEFT MAXILLARY SINUSITIS: ICD-10-CM

## 2023-06-05 RX ORDER — FLUTICASONE PROPIONATE 50 MCG
SPRAY, SUSPENSION (ML) NASAL
Qty: 16 G | Refills: 5 | Status: SHIPPED | OUTPATIENT
Start: 2023-06-05

## 2023-06-23 ENCOUNTER — ANESTHESIA EVENT (OUTPATIENT)
Dept: OPERATING ROOM | Age: 73
End: 2023-06-23
Payer: MEDICARE

## 2023-06-23 ENCOUNTER — TELEPHONE (OUTPATIENT)
Dept: ENT CLINIC | Age: 73
End: 2023-06-23

## 2023-06-26 ENCOUNTER — HOSPITAL ENCOUNTER (OUTPATIENT)
Age: 73
Setting detail: OUTPATIENT SURGERY
Discharge: HOME | End: 2023-06-26
Attending: OTOLARYNGOLOGY | Admitting: OTOLARYNGOLOGY
Payer: MEDICARE

## 2023-06-26 ENCOUNTER — ANESTHESIA (OUTPATIENT)
Dept: OPERATING ROOM | Age: 73
End: 2023-06-26
Payer: MEDICARE

## 2023-06-26 VITALS
WEIGHT: 190 LBS | DIASTOLIC BLOOD PRESSURE: 71 MMHG | OXYGEN SATURATION: 96 % | HEART RATE: 63 BPM | BODY MASS INDEX: 28.14 KG/M2 | RESPIRATION RATE: 16 BRPM | SYSTOLIC BLOOD PRESSURE: 120 MMHG | TEMPERATURE: 97 F | HEIGHT: 69 IN

## 2023-06-26 PROCEDURE — 3600000004 HC SURGERY LEVEL 4 BASE: Performed by: OTOLARYNGOLOGY

## 2023-06-26 PROCEDURE — 6370000000 HC RX 637 (ALT 250 FOR IP): Performed by: OTOLARYNGOLOGY

## 2023-06-26 PROCEDURE — 2720000010 HC SURG SUPPLY STERILE: Performed by: OTOLARYNGOLOGY

## 2023-06-26 PROCEDURE — 6360000002 HC RX W HCPCS: Performed by: NURSE ANESTHETIST, CERTIFIED REGISTERED

## 2023-06-26 PROCEDURE — 7100000010 HC PHASE II RECOVERY - FIRST 15 MIN: Performed by: OTOLARYNGOLOGY

## 2023-06-26 PROCEDURE — 3700000000 HC ANESTHESIA ATTENDED CARE: Performed by: OTOLARYNGOLOGY

## 2023-06-26 PROCEDURE — 2709999900 HC NON-CHARGEABLE SUPPLY: Performed by: OTOLARYNGOLOGY

## 2023-06-26 PROCEDURE — 42975 DISE EVAL SLP DO BRTH FLX DX: CPT | Performed by: OTOLARYNGOLOGY

## 2023-06-26 PROCEDURE — 7100000011 HC PHASE II RECOVERY - ADDTL 15 MIN: Performed by: OTOLARYNGOLOGY

## 2023-06-26 PROCEDURE — 2500000003 HC RX 250 WO HCPCS: Performed by: OTOLARYNGOLOGY

## 2023-06-26 PROCEDURE — 2580000003 HC RX 258: Performed by: ANESTHESIOLOGY

## 2023-06-26 RX ORDER — SODIUM CHLORIDE 0.9 % (FLUSH) 0.9 %
5-40 SYRINGE (ML) INJECTION PRN
Status: DISCONTINUED | OUTPATIENT
Start: 2023-06-26 | End: 2023-06-26 | Stop reason: HOSPADM

## 2023-06-26 RX ORDER — SODIUM CHLORIDE, SODIUM LACTATE, POTASSIUM CHLORIDE, CALCIUM CHLORIDE 600; 310; 30; 20 MG/100ML; MG/100ML; MG/100ML; MG/100ML
INJECTION, SOLUTION INTRAVENOUS CONTINUOUS
Status: DISCONTINUED | OUTPATIENT
Start: 2023-06-26 | End: 2023-06-26 | Stop reason: HOSPADM

## 2023-06-26 RX ORDER — OXYCODONE HYDROCHLORIDE 5 MG/1
5 TABLET ORAL PRN
Status: DISCONTINUED | OUTPATIENT
Start: 2023-06-26 | End: 2023-06-26 | Stop reason: HOSPADM

## 2023-06-26 RX ORDER — SODIUM CHLORIDE 0.9 % (FLUSH) 0.9 %
5-40 SYRINGE (ML) INJECTION EVERY 12 HOURS SCHEDULED
Status: DISCONTINUED | OUTPATIENT
Start: 2023-06-26 | End: 2023-06-26 | Stop reason: HOSPADM

## 2023-06-26 RX ORDER — LIDOCAINE HYDROCHLORIDE 40 MG/ML
INJECTION, SOLUTION RETROBULBAR; TOPICAL PRN
Status: DISCONTINUED | OUTPATIENT
Start: 2023-06-26 | End: 2023-06-26 | Stop reason: ALTCHOICE

## 2023-06-26 RX ORDER — ONDANSETRON 2 MG/ML
4 INJECTION INTRAMUSCULAR; INTRAVENOUS
Status: DISCONTINUED | OUTPATIENT
Start: 2023-06-26 | End: 2023-06-26 | Stop reason: HOSPADM

## 2023-06-26 RX ORDER — OXYCODONE HYDROCHLORIDE 5 MG/1
10 TABLET ORAL PRN
Status: DISCONTINUED | OUTPATIENT
Start: 2023-06-26 | End: 2023-06-26 | Stop reason: HOSPADM

## 2023-06-26 RX ORDER — SODIUM CHLORIDE 9 MG/ML
INJECTION, SOLUTION INTRAVENOUS PRN
Status: DISCONTINUED | OUTPATIENT
Start: 2023-06-26 | End: 2023-06-26 | Stop reason: HOSPADM

## 2023-06-26 RX ORDER — LABETALOL HYDROCHLORIDE 5 MG/ML
5 INJECTION, SOLUTION INTRAVENOUS EVERY 10 MIN PRN
Status: DISCONTINUED | OUTPATIENT
Start: 2023-06-26 | End: 2023-06-26 | Stop reason: HOSPADM

## 2023-06-26 RX ORDER — LIDOCAINE HYDROCHLORIDE 10 MG/ML
1 INJECTION, SOLUTION EPIDURAL; INFILTRATION; INTRACAUDAL; PERINEURAL
Status: DISCONTINUED | OUTPATIENT
Start: 2023-06-26 | End: 2023-06-26 | Stop reason: HOSPADM

## 2023-06-26 RX ORDER — DIPHENHYDRAMINE HYDROCHLORIDE 50 MG/ML
12.5 INJECTION INTRAMUSCULAR; INTRAVENOUS
Status: DISCONTINUED | OUTPATIENT
Start: 2023-06-26 | End: 2023-06-26 | Stop reason: HOSPADM

## 2023-06-26 RX ORDER — MEPERIDINE HYDROCHLORIDE 50 MG/ML
12.5 INJECTION INTRAMUSCULAR; INTRAVENOUS; SUBCUTANEOUS EVERY 5 MIN PRN
Status: DISCONTINUED | OUTPATIENT
Start: 2023-06-26 | End: 2023-06-26 | Stop reason: HOSPADM

## 2023-06-26 RX ORDER — OXYMETAZOLINE HYDROCHLORIDE 0.05 G/100ML
SPRAY NASAL PRN
Status: DISCONTINUED | OUTPATIENT
Start: 2023-06-26 | End: 2023-06-26 | Stop reason: ALTCHOICE

## 2023-06-26 RX ORDER — PROPOFOL 10 MG/ML
INJECTION, EMULSION INTRAVENOUS CONTINUOUS PRN
Status: DISCONTINUED | OUTPATIENT
Start: 2023-06-26 | End: 2023-06-26 | Stop reason: SDUPTHER

## 2023-06-26 RX ADMIN — PROPOFOL 100 MCG/KG/MIN: 10 INJECTION, EMULSION INTRAVENOUS at 07:30

## 2023-06-26 RX ADMIN — SODIUM CHLORIDE, POTASSIUM CHLORIDE, SODIUM LACTATE AND CALCIUM CHLORIDE: 600; 310; 30; 20 INJECTION, SOLUTION INTRAVENOUS at 06:58

## 2023-06-26 RX ADMIN — SODIUM CHLORIDE, POTASSIUM CHLORIDE, SODIUM LACTATE AND CALCIUM CHLORIDE: 600; 310; 30; 20 INJECTION, SOLUTION INTRAVENOUS at 07:02

## 2023-06-26 ASSESSMENT — PAIN SCALES - GENERAL
PAINLEVEL_OUTOF10: 0

## 2023-06-26 ASSESSMENT — PAIN - FUNCTIONAL ASSESSMENT: PAIN_FUNCTIONAL_ASSESSMENT: 0-10

## 2023-06-30 ENCOUNTER — TELEPHONE (OUTPATIENT)
Dept: PULMONOLOGY | Age: 73
End: 2023-06-30

## 2023-06-30 ENCOUNTER — SCHEDULED TELEPHONE ENCOUNTER (OUTPATIENT)
Dept: ENT CLINIC | Age: 73
End: 2023-06-30
Payer: MEDICARE

## 2023-06-30 DIAGNOSIS — G47.33 OBSTRUCTIVE SLEEP APNEA: Primary | ICD-10-CM

## 2023-06-30 PROCEDURE — 99441 PR PHYS/QHP TELEPHONE EVALUATION 5-10 MIN: CPT | Performed by: OTOLARYNGOLOGY

## 2023-07-24 ENCOUNTER — TELEPHONE (OUTPATIENT)
Dept: PULMONOLOGY | Age: 73
End: 2023-07-24

## 2023-07-24 NOTE — TELEPHONE ENCOUNTER
We received a CMN from [address] [place] [address] for a Bipap. But did not see if the Pt had been set up yet. Called the Pt and as soon as I asked if he had been set up with his machine he hung up. I tried to call back and was sent straight to . If Pt was set up he needs a 31-90 scheduled.

## 2023-08-08 ENCOUNTER — OFFICE VISIT (OUTPATIENT)
Dept: INTERNAL MEDICINE CLINIC | Age: 73
End: 2023-08-08

## 2023-08-08 VITALS
DIASTOLIC BLOOD PRESSURE: 66 MMHG | OXYGEN SATURATION: 94 % | BODY MASS INDEX: 28.14 KG/M2 | RESPIRATION RATE: 14 BRPM | HEIGHT: 69 IN | WEIGHT: 190 LBS | HEART RATE: 77 BPM | SYSTOLIC BLOOD PRESSURE: 118 MMHG

## 2023-08-08 DIAGNOSIS — I87.2 VENOUS INSUFFICIENCY OF BOTH LOWER EXTREMITIES: ICD-10-CM

## 2023-08-08 DIAGNOSIS — R73.01 IMPAIRED FASTING GLUCOSE: ICD-10-CM

## 2023-08-08 DIAGNOSIS — E78.5 DYSLIPIDEMIA: ICD-10-CM

## 2023-08-08 DIAGNOSIS — Z86.718 HISTORY OF DEEP VENOUS THROMBOSIS (DVT) OF DISTAL VEIN OF RIGHT LOWER EXTREMITY: ICD-10-CM

## 2023-08-08 DIAGNOSIS — F32.5 MAJOR DEPRESSIVE DISORDER WITH SINGLE EPISODE, IN FULL REMISSION (HCC): ICD-10-CM

## 2023-08-08 DIAGNOSIS — I83.93 ASYMPTOMATIC VARICOSE VEINS OF BILATERAL LOWER EXTREMITIES: ICD-10-CM

## 2023-08-08 DIAGNOSIS — Z01.818 PREOP EXAM FOR INTERNAL MEDICINE: Primary | ICD-10-CM

## 2023-08-08 DIAGNOSIS — S83.241A TEAR OF MEDIAL MENISCUS OF RIGHT KNEE, CURRENT, UNSPECIFIED TEAR TYPE, INITIAL ENCOUNTER: ICD-10-CM

## 2023-08-08 DIAGNOSIS — I10 BENIGN ESSENTIAL HTN: ICD-10-CM

## 2023-08-08 NOTE — PROGRESS NOTES
dyscrasias. REVIEW OF SYSTEMS:    CONSTITUTIONAL:  Neg   Recent weight changes, fatigue, fever, chills or night sweats, anorexia, Sleep difficulties  EYES: Neg  Blurry vision, loss of vision, double vision, tearing, itching, eye pain. EARS:  Neg Hearing loss, tinnitus, vertigo, discharge or otalgia. NOSE:  Neg  Rhinorrhea, sneezing, itching, allergy, epistaxis, or snoring. No sinus pressure or pain. MOUTH/THROAT:  Neg Bleeding gums, hoarseness, sore throat, dysphagia, throat infections, or dentures. RESPIRATORY:  Neg SOB ,wheeze, cough, sputum, hemoptysis. No report of + TB test.    CARDIOVASCULAR:  Neg Chest pain, palpitations, heart murmur, dyspnea on exertion, orthopnea, paroxysmal nocturnal dyspnea or edema of extremities, or claudication. 3 mile walking and running three times per week for 1 hour. He is able to carry groceries up the stairs. He is able to play pickle ball without difficulty. >4 METS  GASTROINTESTINAL:  Neg   Nausea, vomiting, or diarrhea, constipation hematemesis, heart burn, dysphagia,change in bowel movements or stool caliber, hematochezia, melena, abdominal pain, or food intolerance. Colonoscopy: Yes  GENITOURINARY:  Neg  Urinary frequency, hesitancy, urgency, polyuria, dysuria, hematuria, nocturia, incontinence, change in stream, genital pain or swelling, kidney stones  HEMATOLOGIC/LYMPHATIC:  Neg  Anemia, bleeding dyscrasias, easy bruising, transfusions, or enlarged lymph nodes.  + History of right lower extremity DVT (gastrocnemius veins, soleal veins), history of venous insufficiency, varicose veins both lower extremities. MUSCULOSKELETAL: As per HPI  NEUROLOGICAL:  + Weakness right thumb extension possibly due to extensor tendon injury, following right 1st metacarpal ORIF. PSYCHIATRIC:  Mood is under good control.     SKIN :  Neg  Rash, nail changes, sun burns, tattoos, change in moles, or skin color changes  ENDOCRINE:  Neg  Polydipsia,polyuria,abnormal weight

## 2023-08-09 ENCOUNTER — TELEPHONE (OUTPATIENT)
Dept: INTERNAL MEDICINE CLINIC | Age: 73
End: 2023-08-09

## 2023-08-09 DIAGNOSIS — I10 BENIGN ESSENTIAL HTN: ICD-10-CM

## 2023-08-09 DIAGNOSIS — Z01.818 PREOP EXAM FOR INTERNAL MEDICINE: ICD-10-CM

## 2023-08-09 LAB
ANION GAP SERPL CALCULATED.3IONS-SCNC: 13 MMOL/L (ref 3–16)
BASOPHILS # BLD: 0.1 K/UL (ref 0–0.2)
BASOPHILS NFR BLD: 0.8 %
BUN SERPL-MCNC: 20 MG/DL (ref 7–20)
CALCIUM SERPL-MCNC: 9.8 MG/DL (ref 8.3–10.6)
CHLORIDE SERPL-SCNC: 100 MMOL/L (ref 99–110)
CO2 SERPL-SCNC: 24 MMOL/L (ref 21–32)
CREAT SERPL-MCNC: 1.2 MG/DL (ref 0.8–1.3)
DEPRECATED RDW RBC AUTO: 13.4 % (ref 12.4–15.4)
EOSINOPHIL # BLD: 0.7 K/UL (ref 0–0.6)
EOSINOPHIL NFR BLD: 8.1 %
GFR SERPLBLD CREATININE-BSD FMLA CKD-EPI: >60 ML/MIN/{1.73_M2}
GLUCOSE SERPL-MCNC: 73 MG/DL (ref 70–99)
HCT VFR BLD AUTO: 38.4 % (ref 40.5–52.5)
HGB BLD-MCNC: 13.1 G/DL (ref 13.5–17.5)
LYMPHOCYTES # BLD: 2.6 K/UL (ref 1–5.1)
LYMPHOCYTES NFR BLD: 30 %
MCH RBC QN AUTO: 33.4 PG (ref 26–34)
MCHC RBC AUTO-ENTMCNC: 34 G/DL (ref 31–36)
MCV RBC AUTO: 98.2 FL (ref 80–100)
MONOCYTES # BLD: 0.7 K/UL (ref 0–1.3)
MONOCYTES NFR BLD: 8.6 %
NEUTROPHILS # BLD: 4.6 K/UL (ref 1.7–7.7)
NEUTROPHILS NFR BLD: 52.5 %
PLATELET # BLD AUTO: 337 K/UL (ref 135–450)
PMV BLD AUTO: 8.7 FL (ref 5–10.5)
POTASSIUM SERPL-SCNC: 5.4 MMOL/L (ref 3.5–5.1)
RBC # BLD AUTO: 3.91 M/UL (ref 4.2–5.9)
SODIUM SERPL-SCNC: 137 MMOL/L (ref 136–145)
WBC # BLD AUTO: 8.7 K/UL (ref 4–11)

## 2023-08-09 NOTE — TELEPHONE ENCOUNTER
Nathan Morin has been advised that these labs have not been completed yet.  We just saw the patient yesterday at 430

## 2023-08-09 NOTE — TELEPHONE ENCOUNTER
Please fax lab results from 8/8 pre-op to [facility] at fax#:  [phone] as soon as we have back.

## 2023-08-18 DIAGNOSIS — F32.5 MAJOR DEPRESSIVE DISORDER WITH SINGLE EPISODE, IN FULL REMISSION (HCC): ICD-10-CM

## 2023-08-18 DIAGNOSIS — F42.8 OBSESSIVE THINKING: ICD-10-CM

## 2023-08-18 DIAGNOSIS — F41.9 ANXIETY: ICD-10-CM

## 2023-08-18 RX ORDER — CLOMIPRAMINE HYDROCHLORIDE 75 MG/1
CAPSULE ORAL
Qty: 180 CAPSULE | Refills: 1 | Status: SHIPPED | OUTPATIENT
Start: 2023-08-18

## 2023-09-05 ENCOUNTER — OFFICE VISIT (OUTPATIENT)
Dept: PULMONOLOGY | Age: 73
End: 2023-09-05
Payer: MEDICARE

## 2023-09-05 VITALS
BODY MASS INDEX: 28.29 KG/M2 | RESPIRATION RATE: 16 BRPM | OXYGEN SATURATION: 98 % | WEIGHT: 191 LBS | TEMPERATURE: 98.5 F | DIASTOLIC BLOOD PRESSURE: 72 MMHG | SYSTOLIC BLOOD PRESSURE: 118 MMHG | HEART RATE: 62 BPM | HEIGHT: 69 IN

## 2023-09-05 DIAGNOSIS — G47.33 OSA (OBSTRUCTIVE SLEEP APNEA): Primary | ICD-10-CM

## 2023-09-05 DIAGNOSIS — I10 BENIGN ESSENTIAL HTN: ICD-10-CM

## 2023-09-05 DIAGNOSIS — E66.3 OVERWEIGHT (BMI 25.0-29.9): ICD-10-CM

## 2023-09-05 PROBLEM — K13.79 PALATAL LESION: Status: RESOLVED | Noted: 2019-02-21 | Resolved: 2023-09-05

## 2023-09-05 PROCEDURE — 3017F COLORECTAL CA SCREEN DOC REV: CPT | Performed by: NURSE PRACTITIONER

## 2023-09-05 PROCEDURE — G8427 DOCREV CUR MEDS BY ELIG CLIN: HCPCS | Performed by: NURSE PRACTITIONER

## 2023-09-05 PROCEDURE — 3078F DIAST BP <80 MM HG: CPT | Performed by: NURSE PRACTITIONER

## 2023-09-05 PROCEDURE — 99213 OFFICE O/P EST LOW 20 MIN: CPT | Performed by: NURSE PRACTITIONER

## 2023-09-05 PROCEDURE — 1123F ACP DISCUSS/DSCN MKR DOCD: CPT | Performed by: NURSE PRACTITIONER

## 2023-09-05 PROCEDURE — 3074F SYST BP LT 130 MM HG: CPT | Performed by: NURSE PRACTITIONER

## 2023-09-05 PROCEDURE — 1036F TOBACCO NON-USER: CPT | Performed by: NURSE PRACTITIONER

## 2023-09-05 PROCEDURE — G8417 CALC BMI ABV UP PARAM F/U: HCPCS | Performed by: NURSE PRACTITIONER

## 2023-09-05 RX ORDER — OXYCODONE HYDROCHLORIDE 5 MG/1
5 TABLET ORAL EVERY 6 HOURS PRN
COMMUNITY
Start: 2023-08-21 | End: 2023-09-06 | Stop reason: ALTCHOICE

## 2023-09-05 RX ORDER — SODIUM FLUORIDE 6 MG/ML
PASTE, DENTIFRICE DENTAL
COMMUNITY
Start: 2023-06-07

## 2023-09-05 ASSESSMENT — SLEEP AND FATIGUE QUESTIONNAIRES
HOW LIKELY ARE YOU TO NOD OFF OR FALL ASLEEP WHEN YOU ARE A PASSENGER IN A CAR FOR AN HOUR WITHOUT A BREAK: 3
HOW LIKELY ARE YOU TO NOD OFF OR FALL ASLEEP WHILE LYING DOWN TO REST IN THE AFTERNOON WHEN CIRCUMSTANCES PERMIT: 3
HOW LIKELY ARE YOU TO NOD OFF OR FALL ASLEEP WHILE SITTING INACTIVE IN A PUBLIC PLACE: 0
HOW LIKELY ARE YOU TO NOD OFF OR FALL ASLEEP WHILE SITTING QUIETLY AFTER LUNCH WITHOUT ALCOHOL: 0
ESS TOTAL SCORE: 6
NECK CIRCUMFERENCE (INCHES): 16.5
HOW LIKELY ARE YOU TO NOD OFF OR FALL ASLEEP WHILE SITTING AND TALKING TO SOMEONE: 0
HOW LIKELY ARE YOU TO NOD OFF OR FALL ASLEEP WHILE SITTING AND READING: 0
HOW LIKELY ARE YOU TO NOD OFF OR FALL ASLEEP IN A CAR, WHILE STOPPED FOR A FEW MINUTES IN TRAFFIC: 0
HOW LIKELY ARE YOU TO NOD OFF OR FALL ASLEEP WHILE WATCHING TV: 0

## 2023-09-05 ASSESSMENT — ENCOUNTER SYMPTOMS
RHINORRHEA: 0
EYE PAIN: 0
SHORTNESS OF BREATH: 0
ABDOMINAL PAIN: 0
CHEST TIGHTNESS: 0
COUGH: 0
SORE THROAT: 0
WHEEZING: 0

## 2023-09-05 NOTE — PROGRESS NOTES
Chief Complaint   Patient presents with    Follow-up     31-90  HOMER      Carlos Leija comes in today for follow-up of PAP therapy. Diagnosed with moderate obstructive sleep apnea on the study done 4/6/23. (AHI 19.4, desat to 77%, weight 191 lbs)   PAP titration study done 5/4/23 showed pressure of  Auto Bipap best controlled apnea. Patients weight during the study was 191 lbs  Machine received July 2023 . He had DISE completed 6/26/23 and is a candidate for Inspire. States he has a hard time falling asleep with the device. He is laying in bed for  4-5 hours at night with machine on  but then has to remove device to get sleep. Without device he is dreaming a lot. He is wearing nasal pillows. States he has tried to sleep with the Bipap however he can't rest.      R knee surgery for torn meniscus in August 2023. Reports sleepiness is better. Is not having extended sleeping. Is using equipment for 4-5 hours a night. Up at night to use the bathroom about:3-4   Is not snoring with machine. Does not have dry mouth   Is not complaining of mask issues  Is tolerating the pressure. Sleep Medicine 9/5/2023 3/13/2023   Sitting and reading 0 1   Watching TV 0 1   Sitting, inactive in a public place (e.g. a theatre or a meeting) 0 0   As a passenger in a car for an hour without a break 3 3   Lying down to rest in the afternoon when circumstances permit 3 3   Sitting and talking to someone 0 0   Sitting quietly after a lunch without alcohol 0 0   In a car, while stopped for a few minutes in traffic 0 0   [place] Sleepiness Score 6 8   Neck circumference (Inches) 16.5 16     0 = no chance of dozing  1 = slight chance of dozing  2 = moderate chance of dozing  3 = high chance of dozing    Interpretation:   0-7: It is unlikely that you are abnormally sleepy. 8-9:     You have an average amount of daytime sleepiness. 10-15: You may be excessively sleepy depending on the situation.  You may want

## 2023-09-05 NOTE — PROGRESS NOTES
MA Communication: The following orders are received by verbal communication from Kleber Dupont    Patient will call our office for Inspire workup after appointment with ENT.

## 2023-09-05 NOTE — PATIENT INSTRUCTIONS
Tree Rowland has good benefit on PAP therapy. Not able to tolerate CPAP therapy. Poor sleep quality. Will refer back to ENT for Inspire. Compliance report information was analyzed to assess complexity and medical decision making in regards to further testing and management. Advised to avoid driving if too sleepy to function safely and given a discussion of the risks of untreated apneas such as accidents, cognitive impairment, mood impairment, worsening high blood pressure, various cardiac disease and stroke. Regarding overweight, recommend to try a formal program and/or increase physical activity by adding a 30 minute walk to daily routine. Weight loss was encouraged as a long term approach to treatment of HOMER. Explained the correlation between obesity and apnea and the causative role it can play. Blood pressure today is controlled, continue current medication regimen per PCP. Follow up after Inspire insertion or sooner for any issues. Remember to bring a list of pulmonary medications and any CPAP or BiPAP machines to your next appointment with the office. Please keep all of your future appointments scheduled by [facility] office. Out of respect for other patients and providers, you may be asked to reschedule your appointment if you arrive later than your scheduled appointment time. Appointments cancelled less than 24hrs in advance will be considered a no show. Patients with three missed appointments within 1 year or four missed appointments within 2 years can be dismissed from the practice. Please be aware that our physicians are required to work in the Intensive Care Unit at [facility].  Your appointment may need to be rescheduled if they are designated to work during your appointment time. You may receive a survey regarding the care you received during your visit. Your input is valuable to us.   We encourage you to complete and

## 2023-09-06 ENCOUNTER — OFFICE VISIT (OUTPATIENT)
Dept: INTERNAL MEDICINE CLINIC | Age: 73
End: 2023-09-06
Payer: MEDICARE

## 2023-09-06 VITALS
RESPIRATION RATE: 14 BRPM | BODY MASS INDEX: 27.85 KG/M2 | WEIGHT: 188 LBS | HEIGHT: 69 IN | SYSTOLIC BLOOD PRESSURE: 114 MMHG | DIASTOLIC BLOOD PRESSURE: 72 MMHG | OXYGEN SATURATION: 98 % | HEART RATE: 82 BPM

## 2023-09-06 DIAGNOSIS — Z98.890 S/P ARTHROSCOPY OF RIGHT KNEE: ICD-10-CM

## 2023-09-06 DIAGNOSIS — R73.01 IMPAIRED FASTING GLUCOSE: ICD-10-CM

## 2023-09-06 DIAGNOSIS — I87.2 VENOUS INSUFFICIENCY OF BOTH LOWER EXTREMITIES: ICD-10-CM

## 2023-09-06 DIAGNOSIS — F32.5 MAJOR DEPRESSIVE DISORDER WITH SINGLE EPISODE, IN FULL REMISSION (HCC): ICD-10-CM

## 2023-09-06 DIAGNOSIS — Z12.11 COLON CANCER SCREENING: ICD-10-CM

## 2023-09-06 DIAGNOSIS — G47.33 OSA (OBSTRUCTIVE SLEEP APNEA): ICD-10-CM

## 2023-09-06 DIAGNOSIS — Z29.11 NEED FOR RSV IMMUNIZATION: ICD-10-CM

## 2023-09-06 DIAGNOSIS — D64.9 ANEMIA, UNSPECIFIED TYPE: ICD-10-CM

## 2023-09-06 DIAGNOSIS — E78.5 DYSLIPIDEMIA: ICD-10-CM

## 2023-09-06 DIAGNOSIS — I10 BENIGN ESSENTIAL HTN: Primary | ICD-10-CM

## 2023-09-06 PROCEDURE — G8427 DOCREV CUR MEDS BY ELIG CLIN: HCPCS | Performed by: INTERNAL MEDICINE

## 2023-09-06 PROCEDURE — 3078F DIAST BP <80 MM HG: CPT | Performed by: INTERNAL MEDICINE

## 2023-09-06 PROCEDURE — 3074F SYST BP LT 130 MM HG: CPT | Performed by: INTERNAL MEDICINE

## 2023-09-06 PROCEDURE — 1123F ACP DISCUSS/DSCN MKR DOCD: CPT | Performed by: INTERNAL MEDICINE

## 2023-09-06 PROCEDURE — G8417 CALC BMI ABV UP PARAM F/U: HCPCS | Performed by: INTERNAL MEDICINE

## 2023-09-06 PROCEDURE — 99214 OFFICE O/P EST MOD 30 MIN: CPT | Performed by: INTERNAL MEDICINE

## 2023-09-06 PROCEDURE — 3017F COLORECTAL CA SCREEN DOC REV: CPT | Performed by: INTERNAL MEDICINE

## 2023-09-06 PROCEDURE — 1036F TOBACCO NON-USER: CPT | Performed by: INTERNAL MEDICINE

## 2023-09-06 NOTE — PROGRESS NOTES
low simple sugar diet along with regular exercise. Patient counseled    5. Dyslipidemia  Well-controlled  Continue fenofibrate. 6. Major depressive disorder with single episode, in full remission (HCC)  Well-controlled  Continue Anafranil 75 mg 2 daily    7. Colon cancer screening  Colonoscopy scheduled    8. S/P arthroscopy of right knee  Healing well. Patient is mobile  Starting physical therapy    9. Need for RSV immunization    - respiratory syncytial vaccine, adjuvanted (AREXVY) 120 MCG/0.5ML injection; Inject 0.5 mLs into the muscle once for 1 dose  Dispense: 0.5 mL; Refill: 0      10. Normocytic anemia  Hemoglobin much improved-13.1 on 8/9/2023 (up from 11.8 on 3/24/2023)      Encouraged patient to get his flu vaccine and COVID booster. Discussed medications with patient who voiced understanding of their use, indication and potential side effects. Pt also understands the above recommendations. All questions answered. This note was generated completely or in part utilizing Dragon dictation speech recognition software. Occasionally, words are mistranscribed and despite editing, the text may contain inaccuracies due to incorrect word recognition.   If further clarification is needed please contact the office at [phone]       Electronically signed    Mine Allen D.O.

## 2023-09-07 ENCOUNTER — TELEPHONE (OUTPATIENT)
Dept: ENT CLINIC | Age: 73
End: 2023-09-07

## 2023-09-12 NOTE — PROGRESS NOTES
Lacinda Lecher    Age 75 y.o.    male    1950    MRN [id number]    10/2/2023  Arrival Time_____________  OR Time____________[address]     Procedure(s):  INSPIRE PLACEMENT                      General   Surgeon(s):  Sofia Sleet, DO      DAY ADMIT ___  SDS/OP ___  OUTPT IN BED ___        Phone [phone] (home)     PCP _____________________ Phone_________________ Epic ( ) Epic CE ( ) Appt ________    ADDITIONAL INFO __________________________________ Cardio/Consult _____________    NOTES _____________________________________________________________________    ____________________________________________________________________________    PAT APPT DATE:________ TIME: ________  FAXED QAD: _______  (__) H&P w/ Hospitalist  __________________________________________________________________________  Preop Nurse phone screen complete: _____________  (__) CBC     (__) W/ DIFF ___________     (__) Hgb A1C    ___________  (__) CHEST X RAY   __________  (__) LIPID PROFILE  ___________  (__) EKG   __________  (__) PT-INR / APTT  ___________  (__) PFT's   __________  (__) BMP   ___________  (__) CAROTIDS  __________  (__) CMP   ___________  (__) VEIN MAPPING  __________  (__) U/A   ___________  (__) HISTORY & PHYSICAL __________  (__) URINE C & S  ___________  (__) CARDIAC CLEARANCE __________  (__) U/A W/ FLEX  ___________  (__) PULM.  CLEARANCE __________  (__) SERUM PREGNANCY ___________  (__) Check Epic DOS orders __________  (__) TYPE & SCREEN __________repeat ( ) (__)  __________________ __________  (__) Albumin / Prealbumin ___________  (__)  __________________ __________  (__) TRANSFERRIN  ___________  (__)  __________________ __________  (__) LIVER PROFILE  ___________  (__)  __________________ __________  (__) MRSA NASAL SWAB ___________  (__) URINE PREG DOS __________  (__) SED RATE  ___________  (__) BLOOD SUGAR DOS __________  (__) C-REACTIVE PROTEIN ___________    (__) VITAMIN D HYDROXY ___________  (__) [address]

## 2023-09-26 NOTE — PROGRESS NOTES
[place] Anju    Age 75 y.o.    male    1950    MRN [id number]    10/2/2023  Arrival Time_____________  OR Time____________[address]     Procedure(s):  INSPIRE DEVICE PLACEMENT                      General    Surgeon(s):  Otila Trempealeau, DO       Phone [phone] ([place])     [facility]         Work  _____________________________________________________________________  _____________________________________________________________________  _____________________________________________________________________  _____________________________________________________________________  _____________________________________________________________________    PCP _____________________________ Phone_________________     H&P__________________Bringing      Chart            Epic   DOS      Called________  EKG__________________Bringing      Chart            Epic   DOS      Called________  LAB__________________ Bringing      Chart            Epic   DOS      Called________  Cardiac Clearance_______Bringing      Chart            Epic      DOS      Called________    Cardiologist________________________ Phone___________________________    ? Church concerns / Waiver on Chart            PAT Communications________________  ? Pre-op Instructions Given [address]          _________________________________  ? Directions to Surgery Center                          _________________________________  ? Transportation Home_______________      __________________________________  ?  Crutches/Walker__________________        __________________________________    ________Pre-op Orders   _______Transcribed    _______Wt.  ________Pharmacy          _______SCD  ______VTE     ______TED Seferino Wayne  _______  Surgery Consent    _______  Anesthesia Consent         COVID DATE______________LOCATION________________ RESULT__________

## 2023-09-27 ENCOUNTER — OFFICE VISIT (OUTPATIENT)
Dept: INTERNAL MEDICINE CLINIC | Age: 73
End: 2023-09-27
Payer: MEDICARE

## 2023-09-27 VITALS
OXYGEN SATURATION: 97 % | BODY MASS INDEX: 28.44 KG/M2 | HEART RATE: 72 BPM | DIASTOLIC BLOOD PRESSURE: 74 MMHG | WEIGHT: 192 LBS | SYSTOLIC BLOOD PRESSURE: 112 MMHG | HEIGHT: 69 IN | RESPIRATION RATE: 14 BRPM

## 2023-09-27 DIAGNOSIS — I10 BENIGN ESSENTIAL HTN: ICD-10-CM

## 2023-09-27 DIAGNOSIS — I87.2 VENOUS INSUFFICIENCY OF BOTH LOWER EXTREMITIES: ICD-10-CM

## 2023-09-27 DIAGNOSIS — E78.5 DYSLIPIDEMIA: ICD-10-CM

## 2023-09-27 DIAGNOSIS — G47.33 OSA (OBSTRUCTIVE SLEEP APNEA): ICD-10-CM

## 2023-09-27 DIAGNOSIS — F32.5 MAJOR DEPRESSIVE DISORDER WITH SINGLE EPISODE, IN FULL REMISSION (HCC): ICD-10-CM

## 2023-09-27 DIAGNOSIS — R73.01 IMPAIRED FASTING GLUCOSE: ICD-10-CM

## 2023-09-27 DIAGNOSIS — Z01.818 PREOP EXAM FOR INTERNAL MEDICINE: Primary | ICD-10-CM

## 2023-09-27 PROCEDURE — 3078F DIAST BP <80 MM HG: CPT | Performed by: INTERNAL MEDICINE

## 2023-09-27 PROCEDURE — 1123F ACP DISCUSS/DSCN MKR DOCD: CPT | Performed by: INTERNAL MEDICINE

## 2023-09-27 PROCEDURE — 3017F COLORECTAL CA SCREEN DOC REV: CPT | Performed by: INTERNAL MEDICINE

## 2023-09-27 PROCEDURE — G8417 CALC BMI ABV UP PARAM F/U: HCPCS | Performed by: INTERNAL MEDICINE

## 2023-09-27 PROCEDURE — 1036F TOBACCO NON-USER: CPT | Performed by: INTERNAL MEDICINE

## 2023-09-27 PROCEDURE — 99213 OFFICE O/P EST LOW 20 MIN: CPT | Performed by: INTERNAL MEDICINE

## 2023-09-27 PROCEDURE — G8427 DOCREV CUR MEDS BY ELIG CLIN: HCPCS | Performed by: INTERNAL MEDICINE

## 2023-09-27 PROCEDURE — 3074F SYST BP LT 130 MM HG: CPT | Performed by: INTERNAL MEDICINE

## 2023-09-27 NOTE — PROGRESS NOTES
Date and time of surgery :   10/2/23 at 56           Arrival Time:  830     Bring Picture ID and insurance card. Please wear simple, loose fitting clothing to the hospital.   Unruly Marie not bring valuables (money, credit cards, checkbooks, etc.)   DO NOT wear any jewelry or piercings on day of surgery. All body piercing jewelry must be removed. If you have dentures, they will be removed before going to the OR; we will provide you a container. If you wear contact lenses or glasses, they will be removed; please bring a case for them. Shower the evening before or morning of surgery with antibacterial soap. Nothing to eat or drink after midnight the day before surgery. You may brush your teeth and gargle the morning of surgery. DO NOT SWALLOW WATER. Take only your Metoprolol the morning of your surgery with a sip of water  Aspirin, Ibuprofen, Advil, Naproxen, Vitamin E and other Anti-inflammatory products and supplements should be stopped for 5 -7days before surgery or as directed by your physician. Do not smoke or drink any alcoholic beverages 24 hours prior to surgery. This includes NA Beer. Refrain from the usage of any recreational drugs, including non-prescribed prescription drugs. You MUST plan for a responsible adult to stay on site while you are here and take you home after your surgery. You will not be allowed to leave alone or drive yourself home. It is strongly suggested someone stay with you the first 24 hrs. Your surgery will be cancelled if you do not have a ride home. To help prevent infection, change your sheets the night before surgery. If you  have a Living Will and Durable Power of  for Healthcare, please bring in a copy. Notify your Surgeon if you develop any illness between now and time of surgery.  Cough, cold, fever, sore throat, nausea, vomiting, etc.  Please notify your surgeon if you experience dizziness, shortness of breath or blurred vision between now & the time of your

## 2023-09-27 NOTE — PROGRESS NOTES
more obsessive thoughts and feeling like he was not in control. He had periods of apprehension, fearfulness, nervousness, heart palpitations and heart racing. Patient remains on Anafranil 150 mg daily. HOMER-- His sleep study was 4/6/2023 showing moderate obstructive sleep apnea. Noe Ba He wanted to get the [facility]. CPAP was required first.  He is tolerating the CPAP. Medical/Surgical Histories     Past Medical History:   Diagnosis Date    Acquired spondylolisthesis of lumbosacral region 02/08/2019    Alcoholism ([address])     Anxiety     Basal cell cancer 08/2010    Dr. Indu Longoria    Cataracts, bilateral 08/2023    Chronic bilateral low back pain without sciatica 02/08/2019    Closed displaced fracture of first metacarpal bone of right hand 12/20/2022    Residual extensor tendon rupture.     DDD (degenerative disc disease), lumbar 02/08/2019    Depression     DVT, lower extremity ([address]) 05/29/2015    Right, gastroc, soleal    GERD (gastroesophageal reflux disease)     Hyperlipidemia     Hypertension     Impaired fasting glucose 9/6/2023    Lumbar facet arthropathy 02/15/2019    Lumbar foraminal stenosis 02/15/2019    HOMER (obstructive sleep apnea) 4/12/2023    Osteopenia     Sleep apnea     Squamous cell skin cancer 2017    right ear lobe    Stasis dermatitis     Varicose veins of both lower extremities     Venous insufficiency of both lower extremities           Past Surgical History:   Procedure Laterality Date    ANESTHESIA NERVE BLOCK Bilateral 03/25/2019    BILATERAL L3,L4,L5 DR MEDIAL BRANCH BLOCKS WITH FLUOROSCOPY performed by Timothy Olvera MD at [address]      from [facility]  12/27/2022    ORIF right 1st metacarpal, Dr. Abena Pham N/A 6/26/2023    DRUG INDUCED SLEEP ENDOSCOPY performed by Seun Wilson DO at [facility] Bilateral 03/04/2019    BILATERAL L3, L4, L5 DORSAL RAMUS LUMBAR MEDIAL BRANCH BLOCK WITH FLUOROSCOPY performed by

## 2023-09-28 ENCOUNTER — PREP FOR PROCEDURE (OUTPATIENT)
Dept: ENT CLINIC | Age: 73
End: 2023-09-28

## 2023-09-28 RX ORDER — SODIUM CHLORIDE 0.9 % (FLUSH) 0.9 %
5-40 SYRINGE (ML) INJECTION EVERY 12 HOURS SCHEDULED
Status: CANCELLED | OUTPATIENT
Start: 2023-09-28

## 2023-09-28 RX ORDER — SODIUM CHLORIDE 9 MG/ML
INJECTION, SOLUTION INTRAVENOUS PRN
Status: CANCELLED | OUTPATIENT
Start: 2023-09-28

## 2023-09-28 RX ORDER — SODIUM CHLORIDE 0.9 % (FLUSH) 0.9 %
5-40 SYRINGE (ML) INJECTION PRN
Status: CANCELLED | OUTPATIENT
Start: 2023-09-28

## 2023-10-02 ENCOUNTER — ANESTHESIA EVENT (OUTPATIENT)
Dept: OPERATING ROOM | Age: 73
End: 2023-10-02
Payer: MEDICARE

## 2023-10-02 ENCOUNTER — APPOINTMENT (OUTPATIENT)
Dept: GENERAL RADIOLOGY | Age: 73
End: 2023-10-02
Attending: OTOLARYNGOLOGY
Payer: MEDICARE

## 2023-10-02 ENCOUNTER — HOSPITAL ENCOUNTER (OUTPATIENT)
Age: 73
Setting detail: OUTPATIENT SURGERY
Discharge: HOME | End: 2023-10-02
Attending: OTOLARYNGOLOGY | Admitting: OTOLARYNGOLOGY
Payer: MEDICARE

## 2023-10-02 ENCOUNTER — ANESTHESIA (OUTPATIENT)
Dept: OPERATING ROOM | Age: 73
End: 2023-10-02
Payer: MEDICARE

## 2023-10-02 VITALS
OXYGEN SATURATION: 97 % | DIASTOLIC BLOOD PRESSURE: 54 MMHG | TEMPERATURE: 97.2 F | HEART RATE: 76 BPM | RESPIRATION RATE: 16 BRPM | BODY MASS INDEX: 28.44 KG/M2 | WEIGHT: 192 LBS | SYSTOLIC BLOOD PRESSURE: 94 MMHG | HEIGHT: 69 IN

## 2023-10-02 DIAGNOSIS — G89.18 POST-OP PAIN: Primary | ICD-10-CM

## 2023-10-02 LAB
ANION GAP SERPL CALCULATED.3IONS-SCNC: 10 MMOL/L (ref 3–16)
BUN SERPL-MCNC: 18 MG/DL (ref 7–20)
CALCIUM SERPL-MCNC: 8.9 MG/DL (ref 8.3–10.6)
CHLORIDE SERPL-SCNC: 103 MMOL/L (ref 99–110)
CO2 SERPL-SCNC: 24 MMOL/L (ref 21–32)
CREAT SERPL-MCNC: 1 MG/DL (ref 0.8–1.3)
GFR SERPLBLD CREATININE-BSD FMLA CKD-EPI: >60 ML/MIN/{1.73_M2}
GLUCOSE SERPL-MCNC: 108 MG/DL (ref 70–99)
POTASSIUM SERPL-SCNC: 4.2 MMOL/L (ref 3.5–5.1)
SODIUM SERPL-SCNC: 137 MMOL/L (ref 136–145)

## 2023-10-02 PROCEDURE — 7100000011 HC PHASE II RECOVERY - ADDTL 15 MIN: Performed by: OTOLARYNGOLOGY

## 2023-10-02 PROCEDURE — C1778 LEAD, NEUROSTIMULATOR: HCPCS | Performed by: OTOLARYNGOLOGY

## 2023-10-02 PROCEDURE — 2720000010 HC SURG SUPPLY STERILE: Performed by: OTOLARYNGOLOGY

## 2023-10-02 PROCEDURE — 7100000000 HC PACU RECOVERY - FIRST 15 MIN: Performed by: OTOLARYNGOLOGY

## 2023-10-02 PROCEDURE — 3600000004 HC SURGERY LEVEL 4 BASE: Performed by: OTOLARYNGOLOGY

## 2023-10-02 PROCEDURE — 6360000002 HC RX W HCPCS: Performed by: OTOLARYNGOLOGY

## 2023-10-02 PROCEDURE — 3700000000 HC ANESTHESIA ATTENDED CARE: Performed by: OTOLARYNGOLOGY

## 2023-10-02 PROCEDURE — 2580000003 HC RX 258: Performed by: OTOLARYNGOLOGY

## 2023-10-02 PROCEDURE — 6360000002 HC RX W HCPCS: Performed by: NURSE ANESTHETIST, CERTIFIED REGISTERED

## 2023-10-02 PROCEDURE — 7100000010 HC PHASE II RECOVERY - FIRST 15 MIN: Performed by: OTOLARYNGOLOGY

## 2023-10-02 PROCEDURE — 3600000014 HC SURGERY LEVEL 4 ADDTL 15MIN: Performed by: OTOLARYNGOLOGY

## 2023-10-02 PROCEDURE — 80048 BASIC METABOLIC PNL TOTAL CA: CPT

## 2023-10-02 PROCEDURE — 2500000003 HC RX 250 WO HCPCS: Performed by: NURSE ANESTHETIST, CERTIFIED REGISTERED

## 2023-10-02 PROCEDURE — 70360 X-RAY EXAM OF NECK: CPT

## 2023-10-02 PROCEDURE — A4217 STERILE WATER/SALINE, 500 ML: HCPCS | Performed by: OTOLARYNGOLOGY

## 2023-10-02 PROCEDURE — C1767 GENERATOR, NEURO NON-RECHARG: HCPCS | Performed by: OTOLARYNGOLOGY

## 2023-10-02 PROCEDURE — 64582 OPN MPLTJ HPGLSL NSTM ARY PG: CPT | Performed by: OTOLARYNGOLOGY

## 2023-10-02 PROCEDURE — 3700000001 HC ADD 15 MINUTES (ANESTHESIA): Performed by: OTOLARYNGOLOGY

## 2023-10-02 PROCEDURE — 71045 X-RAY EXAM CHEST 1 VIEW: CPT

## 2023-10-02 PROCEDURE — 2500000003 HC RX 250 WO HCPCS: Performed by: OTOLARYNGOLOGY

## 2023-10-02 PROCEDURE — 2580000003 HC RX 258: Performed by: ANESTHESIOLOGY

## 2023-10-02 PROCEDURE — 2709999900 HC NON-CHARGEABLE SUPPLY: Performed by: OTOLARYNGOLOGY

## 2023-10-02 PROCEDURE — C1787 PATIENT PROGR, NEUROSTIM: HCPCS | Performed by: OTOLARYNGOLOGY

## 2023-10-02 PROCEDURE — 7100000001 HC PACU RECOVERY - ADDTL 15 MIN: Performed by: OTOLARYNGOLOGY

## 2023-10-02 DEVICE — LEAD SENSING RESPIRATORY INSPIRE: Type: IMPLANTABLE DEVICE | Site: CHEST | Status: FUNCTIONAL

## 2023-10-02 DEVICE — LEAD STIMULATION UPPER AIRWAY INSPIRE: Type: IMPLANTABLE DEVICE | Site: CHIN | Status: FUNCTIONAL

## 2023-10-02 DEVICE — GENERATOR PULSE IMPLANTABLE INSPIRE: Type: IMPLANTABLE DEVICE | Site: CHEST | Status: FUNCTIONAL

## 2023-10-02 RX ORDER — ROCURONIUM BROMIDE 10 MG/ML
INJECTION, SOLUTION INTRAVENOUS PRN
Status: DISCONTINUED | OUTPATIENT
Start: 2023-10-02 | End: 2023-10-02 | Stop reason: SDUPTHER

## 2023-10-02 RX ORDER — CEFAZOLIN SODIUM IN 0.9 % NACL 2 G/100 ML
2000 PLASTIC BAG, INJECTION (ML) INTRAVENOUS
Status: COMPLETED | OUTPATIENT
Start: 2023-10-02 | End: 2023-10-02

## 2023-10-02 RX ORDER — SODIUM CHLORIDE 9 MG/ML
INJECTION, SOLUTION INTRAVENOUS PRN
Status: DISCONTINUED | OUTPATIENT
Start: 2023-10-02 | End: 2023-10-02 | Stop reason: HOSPADM

## 2023-10-02 RX ORDER — CEPHALEXIN 500 MG/1
500 CAPSULE ORAL 3 TIMES DAILY
Qty: 15 CAPSULE | Refills: 0 | Status: SHIPPED | OUTPATIENT
Start: 2023-10-02 | End: 2023-10-07

## 2023-10-02 RX ORDER — SODIUM CHLORIDE 0.9 % (FLUSH) 0.9 %
5-40 SYRINGE (ML) INJECTION PRN
Status: DISCONTINUED | OUTPATIENT
Start: 2023-10-02 | End: 2023-10-02 | Stop reason: HOSPADM

## 2023-10-02 RX ORDER — EPHEDRINE SULFATE 50 MG/ML
INJECTION INTRAVENOUS PRN
Status: DISCONTINUED | OUTPATIENT
Start: 2023-10-02 | End: 2023-10-02 | Stop reason: SDUPTHER

## 2023-10-02 RX ORDER — LIDOCAINE HYDROCHLORIDE 20 MG/ML
INJECTION, SOLUTION EPIDURAL; INFILTRATION; INTRACAUDAL; PERINEURAL PRN
Status: DISCONTINUED | OUTPATIENT
Start: 2023-10-02 | End: 2023-10-02 | Stop reason: SDUPTHER

## 2023-10-02 RX ORDER — SODIUM CHLORIDE 0.9 % (FLUSH) 0.9 %
5-40 SYRINGE (ML) INJECTION EVERY 12 HOURS SCHEDULED
Status: DISCONTINUED | OUTPATIENT
Start: 2023-10-02 | End: 2023-10-02 | Stop reason: HOSPADM

## 2023-10-02 RX ORDER — SODIUM CHLORIDE, SODIUM LACTATE, POTASSIUM CHLORIDE, CALCIUM CHLORIDE 600; 310; 30; 20 MG/100ML; MG/100ML; MG/100ML; MG/100ML
INJECTION, SOLUTION INTRAVENOUS CONTINUOUS
Status: DISCONTINUED | OUTPATIENT
Start: 2023-10-02 | End: 2023-10-02 | Stop reason: HOSPADM

## 2023-10-02 RX ORDER — SUCCINYLCHOLINE CHLORIDE 20 MG/ML
INJECTION INTRAMUSCULAR; INTRAVENOUS PRN
Status: DISCONTINUED | OUTPATIENT
Start: 2023-10-02 | End: 2023-10-02 | Stop reason: SDUPTHER

## 2023-10-02 RX ORDER — HYDROCODONE BITARTRATE AND ACETAMINOPHEN 5; 325 MG/1; MG/1
1 TABLET ORAL EVERY 6 HOURS PRN
Qty: 10 TABLET | Refills: 0 | Status: SHIPPED | OUTPATIENT
Start: 2023-10-02 | End: 2023-10-05

## 2023-10-02 RX ORDER — MAGNESIUM HYDROXIDE 1200 MG/15ML
LIQUID ORAL CONTINUOUS PRN
Status: COMPLETED | OUTPATIENT
Start: 2023-10-02 | End: 2023-10-02

## 2023-10-02 RX ORDER — LIDOCAINE HYDROCHLORIDE 10 MG/ML
0.3 INJECTION, SOLUTION EPIDURAL; INFILTRATION; INTRACAUDAL; PERINEURAL
Status: DISCONTINUED | OUTPATIENT
Start: 2023-10-02 | End: 2023-10-02 | Stop reason: HOSPADM

## 2023-10-02 RX ORDER — PROPOFOL 10 MG/ML
INJECTION, EMULSION INTRAVENOUS PRN
Status: DISCONTINUED | OUTPATIENT
Start: 2023-10-02 | End: 2023-10-02 | Stop reason: SDUPTHER

## 2023-10-02 RX ORDER — GLYCOPYRROLATE 0.2 MG/ML
INJECTION INTRAMUSCULAR; INTRAVENOUS PRN
Status: DISCONTINUED | OUTPATIENT
Start: 2023-10-02 | End: 2023-10-02 | Stop reason: SDUPTHER

## 2023-10-02 RX ORDER — ONDANSETRON 2 MG/ML
INJECTION INTRAMUSCULAR; INTRAVENOUS PRN
Status: DISCONTINUED | OUTPATIENT
Start: 2023-10-02 | End: 2023-10-02 | Stop reason: SDUPTHER

## 2023-10-02 RX ORDER — PHENYLEPHRINE HCL IN 0.9% NACL 1 MG/10 ML
SYRINGE (ML) INTRAVENOUS PRN
Status: DISCONTINUED | OUTPATIENT
Start: 2023-10-02 | End: 2023-10-02 | Stop reason: SDUPTHER

## 2023-10-02 RX ORDER — ACETAMINOPHEN 500 MG
1000 TABLET ORAL
Status: DISCONTINUED | OUTPATIENT
Start: 2023-10-02 | End: 2023-10-02 | Stop reason: HOSPADM

## 2023-10-02 RX ORDER — ONDANSETRON 2 MG/ML
4 INJECTION INTRAMUSCULAR; INTRAVENOUS
Status: DISCONTINUED | OUTPATIENT
Start: 2023-10-02 | End: 2023-10-02 | Stop reason: HOSPADM

## 2023-10-02 RX ORDER — LIDOCAINE HYDROCHLORIDE AND EPINEPHRINE 10; 10 MG/ML; UG/ML
INJECTION, SOLUTION INFILTRATION; PERINEURAL PRN
Status: DISCONTINUED | OUTPATIENT
Start: 2023-10-02 | End: 2023-10-02 | Stop reason: ALTCHOICE

## 2023-10-02 RX ORDER — DROPERIDOL 2.5 MG/ML
0.62 INJECTION, SOLUTION INTRAMUSCULAR; INTRAVENOUS
Status: DISCONTINUED | OUTPATIENT
Start: 2023-10-02 | End: 2023-10-02 | Stop reason: HOSPADM

## 2023-10-02 RX ORDER — OXYCODONE HYDROCHLORIDE 5 MG/1
5 TABLET ORAL EVERY 30 MIN PRN
Status: DISCONTINUED | OUTPATIENT
Start: 2023-10-02 | End: 2023-10-02 | Stop reason: HOSPADM

## 2023-10-02 RX ORDER — DEXAMETHASONE SODIUM PHOSPHATE 10 MG/ML
INJECTION INTRAMUSCULAR; INTRAVENOUS PRN
Status: DISCONTINUED | OUTPATIENT
Start: 2023-10-02 | End: 2023-10-02 | Stop reason: SDUPTHER

## 2023-10-02 RX ORDER — FENTANYL CITRATE 50 UG/ML
INJECTION, SOLUTION INTRAMUSCULAR; INTRAVENOUS PRN
Status: DISCONTINUED | OUTPATIENT
Start: 2023-10-02 | End: 2023-10-02 | Stop reason: SDUPTHER

## 2023-10-02 RX ADMIN — FENTANYL CITRATE 50 MCG: 50 INJECTION, SOLUTION INTRAMUSCULAR; INTRAVENOUS at 11:30

## 2023-10-02 RX ADMIN — PROPOFOL 50 MG: 10 INJECTION, EMULSION INTRAVENOUS at 11:08

## 2023-10-02 RX ADMIN — GLYCOPYRROLATE 0.2 MG: 0.2 INJECTION, SOLUTION INTRAMUSCULAR; INTRAVENOUS at 10:53

## 2023-10-02 RX ADMIN — PROPOFOL 30 MG: 10 INJECTION, EMULSION INTRAVENOUS at 11:43

## 2023-10-02 RX ADMIN — SUCCINYLCHOLINE CHLORIDE 180 MG: 20 INJECTION, SOLUTION INTRAMUSCULAR; INTRAVENOUS at 10:41

## 2023-10-02 RX ADMIN — PROPOFOL 150 MG: 10 INJECTION, EMULSION INTRAVENOUS at 10:41

## 2023-10-02 RX ADMIN — DEXAMETHASONE SODIUM PHOSPHATE 10 MG: 10 INJECTION INTRAMUSCULAR; INTRAVENOUS at 10:54

## 2023-10-02 RX ADMIN — EPHEDRINE SULFATE 10 MG: 50 INJECTION INTRAVENOUS at 10:58

## 2023-10-02 RX ADMIN — SODIUM CHLORIDE, POTASSIUM CHLORIDE, SODIUM LACTATE AND CALCIUM CHLORIDE: 600; 310; 30; 20 INJECTION, SOLUTION INTRAVENOUS at 11:23

## 2023-10-02 RX ADMIN — EPHEDRINE SULFATE 10 MG: 50 INJECTION INTRAVENOUS at 11:22

## 2023-10-02 RX ADMIN — Medication 200 MCG: at 11:05

## 2023-10-02 RX ADMIN — Medication 2000 MG: at 10:45

## 2023-10-02 RX ADMIN — Medication 100 MCG: at 11:40

## 2023-10-02 RX ADMIN — Medication 100 MCG: at 11:23

## 2023-10-02 RX ADMIN — FENTANYL CITRATE 50 MCG: 50 INJECTION, SOLUTION INTRAMUSCULAR; INTRAVENOUS at 11:10

## 2023-10-02 RX ADMIN — EPHEDRINE SULFATE 10 MG: 50 INJECTION INTRAVENOUS at 11:18

## 2023-10-02 RX ADMIN — SODIUM CHLORIDE, POTASSIUM CHLORIDE, SODIUM LACTATE AND CALCIUM CHLORIDE: 600; 310; 30; 20 INJECTION, SOLUTION INTRAVENOUS at 09:29

## 2023-10-02 RX ADMIN — ONDANSETRON 4 MG: 2 INJECTION INTRAMUSCULAR; INTRAVENOUS at 10:54

## 2023-10-02 RX ADMIN — EPHEDRINE SULFATE 10 MG: 50 INJECTION INTRAVENOUS at 10:53

## 2023-10-02 RX ADMIN — Medication 100 MCG: at 11:00

## 2023-10-02 RX ADMIN — EPHEDRINE SULFATE 10 MG: 50 INJECTION INTRAVENOUS at 11:05

## 2023-10-02 RX ADMIN — LIDOCAINE HYDROCHLORIDE 60 MG: 20 INJECTION, SOLUTION EPIDURAL; INFILTRATION; INTRACAUDAL; PERINEURAL at 10:41

## 2023-10-02 RX ADMIN — ROCURONIUM BROMIDE 5 MG: 50 INJECTION, SOLUTION INTRAVENOUS at 10:41

## 2023-10-02 RX ADMIN — FENTANYL CITRATE 50 MCG: 50 INJECTION, SOLUTION INTRAMUSCULAR; INTRAVENOUS at 10:35

## 2023-10-02 RX ADMIN — FENTANYL CITRATE 50 MCG: 50 INJECTION, SOLUTION INTRAMUSCULAR; INTRAVENOUS at 11:25

## 2023-10-02 RX ADMIN — FENTANYL CITRATE 50 MCG: 50 INJECTION, SOLUTION INTRAMUSCULAR; INTRAVENOUS at 10:41

## 2023-10-02 ASSESSMENT — PAIN - FUNCTIONAL ASSESSMENT: PAIN_FUNCTIONAL_ASSESSMENT: 0-10

## 2023-10-02 NOTE — PROGRESS NOTES
Report received from [facility]. Denies pain except sore throat. VSS. Xray completed at bedside now. Wife at bedside visiting.

## 2023-10-02 NOTE — OP NOTE
the pectoralis muscle. This fatty layer was  removed with use of kittner exposing the external intercostal fascia. A DeBakey was used to lift the external intercostal fibers Adams was used to create an opening down to the internal intercostal fascial plane. The sensor lead was then placed into the opening. This was secured with three 3-0 silk sutures using the primary anchor to the external intercostal fibers. The secondary anchor was then secured with two 3-0 silk sutures to the superficial pectoralis major muscle. The stimulation lead was then tunneled from the neck in a subplatysmal plane and brought out into the pocket leaving 3 cm strain relief in the neck. The leads were  connected to the device using the 2 person 3 handed technique after ensuring all the connectors were clean and dry. The device was then tested using the . Tongue protrusion was tested at 1 V and in decreasing voltages until the tongue did not move. Diagnostic evaluation was run, which confirmed a good respiration sensing signal as well as good tongue protrusion stimulation. The IPG was placed in the subclavicular pocket and secured loosely to the pectoralis fascia using the previously placed 2-0 silk suture. The wounds were then closed in three layers with deep 3-0 Vicryl and a 4-0 running subcuticular monocryl. Dermabond was applied. The patient was then awakened, extubated, and transferred to Recovery Room in stable condition.        Electronically signed by Josue Moran DO on 10/2/2023 at 12:01 PM

## 2023-10-02 NOTE — ANESTHESIA POSTPROCEDURE EVALUATION
Department of Anesthesiology  Postprocedure Note    Patient: Inocencio Cline  MRN: [id number]  YOB: 1950  Date of evaluation: 10/2/2023      Procedure Summary     Date: 10/02/23 Room / Location: [facility] 01 / [address] [place] [address]    Anesthesia Start: 7541 Anesthesia Stop: 4887    Procedure: [address] (Chest) Diagnosis:       Obstructive sleep apnea (adult) (pediatric)      (Obstructive sleep apnea (adult) (pediatric) [G47.33])    Surgeons: Alondra Anne DO Responsible Provider: Ernestina Jimenez MD    Anesthesia Type: general ASA Status: 2          Anesthesia Type: No value filed.     Gigi Phase I: Gigi Score: 10    Gigi Phase II: Gigi Score: 10      Anesthesia Post Evaluation    Patient location during evaluation: PACU  Patient participation: complete - patient participated  Level of consciousness: awake and alert  Airway patency: patent  Nausea & Vomiting: no nausea and no vomiting  Complications: no  Cardiovascular status: hemodynamically stable  Respiratory status: acceptable, room air and spontaneous ventilation  Hydration status: stable  Comments: --------------------            10/02/23               1330     --------------------   BP:     (!) 94/54    Pulse:      76       Resp:       16       Temp:                SpO2:      97%      --------------------   Pain management: adequate

## 2023-10-02 NOTE — DISCHARGE INSTRUCTIONS
emergency. Sudden increase in pain    ANESTHESIA DISCHARGE INSTRUCTIONS    You are under the influence of drugs- do not drink alcohol, drive a car, operate machinery(such as power tools, kitchen appliances, etc), sign legal documents, or make any important decisions for 24 hours (or while on pain medications). Children should not ride bikes or Bastian or play on gym sets  for 24 hours after surgery. A responsible adult should be with you for 24 hours. Rest at home today- increase activity as tolerated. Progress slowly to a regular diet unless your physician has instructed you otherwise. Drink plenty of water. CALL YOUR DOCTOR IF YOU:  Have moderate to severe nausea or vomiting AND are unable to hold down fluids or prescribed medications. Have bright red bloody drainage from your dressing that won't stop oozing. Do not get relief with your pain medication    NORMAL (POSSIBLE) SIDE EFFECTS FROM ANESTHESIA:     Confusion, temporary memory loss, delayed reaction times in the first 24 hours  Lightheadedness, dizziness, difficulty focusing, blurred vision  Nausea/vomiting can happen  Shivering, feeling cold, sore throat, cough and muscle aches should stop within 24-48 hours  Trouble urinating - call your surgeon if it has been more than 8 hrs  Bruising or soreness at the IV site - call if it remains red, firm or there is drainage             FEMALES OF CHILDBEARING AGE WHO ARE TAKING BIRTH CONTROL PILLS:  You may have received a medication during your procedure that interferes with the   actions of birth control pills (Bridion or Emend). Use some other kind of birth control in addition to your pills, like a condom, for 1 month after your procedure to prevent unwanted pregnancy. The following instructions are to be followed if you have a known history or diagnosis of sleep apnea:   For all sleep apnea patients:  ? Sleep on your side or sitting up in a chair whenever possible, especially the first 24

## 2023-10-10 ENCOUNTER — OFFICE VISIT (OUTPATIENT)
Dept: ENT CLINIC | Age: 73
End: 2023-10-10

## 2023-10-10 VITALS
DIASTOLIC BLOOD PRESSURE: 72 MMHG | BODY MASS INDEX: 28.44 KG/M2 | HEART RATE: 66 BPM | HEIGHT: 69 IN | TEMPERATURE: 97.2 F | WEIGHT: 192 LBS | SYSTOLIC BLOOD PRESSURE: 113 MMHG | RESPIRATION RATE: 16 BRPM

## 2023-10-10 DIAGNOSIS — G47.33 OBSTRUCTIVE SLEEP APNEA: Primary | ICD-10-CM

## 2023-10-10 PROCEDURE — 99024 POSTOP FOLLOW-UP VISIT: CPT | Performed by: OTOLARYNGOLOGY

## 2023-10-11 ENCOUNTER — TELEPHONE (OUTPATIENT)
Dept: PULMONOLOGY | Age: 73
End: 2023-10-11

## 2023-10-11 NOTE — TELEPHONE ENCOUNTER
Received notification from [facility] that the Pt returned his PAP stating that his inspire has been implanted.

## 2023-10-16 ENCOUNTER — TELEPHONE (OUTPATIENT)
Dept: INTERNAL MEDICINE CLINIC | Age: 73
End: 2023-10-16

## 2023-10-16 DIAGNOSIS — R00.0 RACING HEART BEAT: ICD-10-CM

## 2023-10-16 RX ORDER — METOPROLOL SUCCINATE 25 MG/1
25 TABLET, EXTENDED RELEASE ORAL DAILY
Qty: 90 TABLET | Refills: 3 | Status: SHIPPED | OUTPATIENT
Start: 2023-10-16

## 2023-10-16 NOTE — TELEPHONE ENCOUNTER
----- Message from Danyellesa Soto sent at 10/16/2023  2:57 PM EDT -----  Subject: Refill Request    QUESTIONS  Name of Medication? metoprolol succinate (TOPROL XL) 25 MG extended   release tablet  Patient-reported dosage and instructions? 25mg ER once per day   How many days do you have left? 0  [facility]? [id number]  Pharmacy phone number (if available)? [phone]  Additional Information for Provider? looks like the old pharmacy was where   the scripts went to needs the scripts to go to the listed her on chart ,   please send to the I have listed , pharmacy on [address]   ---------------------------------------------------------------------------  --------------  [address]  What is the best way for the office to contact you? OK to leave message on   voicemail  Preferred Call Back Phone Number? [phone]  ---------------------------------------------------------------------------  --------------  SCRIPT ANSWERS  Relationship to Patient?  Self

## 2023-11-28 ENCOUNTER — PROCEDURE VISIT (OUTPATIENT)
Dept: PULMONOLOGY | Age: 73
End: 2023-11-28
Payer: MEDICARE

## 2023-11-28 DIAGNOSIS — E66.3 OVERWEIGHT (BMI 25.0-29.9): ICD-10-CM

## 2023-11-28 DIAGNOSIS — Z96.82 S/P INSERTION OF HYPOGLOSSAL NERVE STIMULATOR: ICD-10-CM

## 2023-11-28 DIAGNOSIS — G47.33 OSA (OBSTRUCTIVE SLEEP APNEA): Primary | ICD-10-CM

## 2023-11-28 DIAGNOSIS — G47.52 RBD (REM BEHAVIORAL DISORDER): ICD-10-CM

## 2023-11-28 PROCEDURE — 3017F COLORECTAL CA SCREEN DOC REV: CPT | Performed by: INTERNAL MEDICINE

## 2023-11-28 PROCEDURE — 1123F ACP DISCUSS/DSCN MKR DOCD: CPT | Performed by: INTERNAL MEDICINE

## 2023-11-28 PROCEDURE — 99215 OFFICE O/P EST HI 40 MIN: CPT | Performed by: INTERNAL MEDICINE

## 2023-11-28 PROCEDURE — G8428 CUR MEDS NOT DOCUMENT: HCPCS | Performed by: INTERNAL MEDICINE

## 2023-11-28 PROCEDURE — G8484 FLU IMMUNIZE NO ADMIN: HCPCS | Performed by: INTERNAL MEDICINE

## 2023-11-28 PROCEDURE — 95977 ALYS CPLX CN NPGT PRGRMG: CPT | Performed by: INTERNAL MEDICINE

## 2023-11-28 PROCEDURE — G8417 CALC BMI ABV UP PARAM F/U: HCPCS | Performed by: INTERNAL MEDICINE

## 2023-11-28 PROCEDURE — 1036F TOBACCO NON-USER: CPT | Performed by: INTERNAL MEDICINE

## 2023-11-28 ASSESSMENT — ENCOUNTER SYMPTOMS
RESPIRATORY NEGATIVE: 1
GASTROINTESTINAL NEGATIVE: 1
ALLERGIC/IMMUNOLOGIC NEGATIVE: 1
EYES NEGATIVE: 1

## 2023-11-28 NOTE — PROGRESS NOTES
Kirsten Rene (: 1950 ) is a 75 y.o. male here for an evaluation of   Chief Complaint   Patient presents with    Procedure     Inspire activation         ASSESSMENT/PLAN:   Diagnosis Orders   1. HOMER (obstructive sleep apnea)        2. RBD (REM behavioral disorder)        3. Overweight (BMI 25.0-29.9)        4. S/P insertion of hypoglossal nerve stimulator                I  RECOMMENDATIONS:     Advised to avoid driving when too sleepy to function safely and given a discussion of the risks of untreated apnea such as accidents, cognitive impairment, mood impairment, high blood pressure, various cardiac diseases and stroke. Weight loss was encouraged. Sleep study to qualify for inspire          Patient was then placed on a CPAP and titrated  Patient tried CPAP but was unable to tolerate      Drug-induced sleep endoscopy  Date of Procedure: 23  Time: 0900     Pre Operative Diagnoses: Obstructive Sleep Apnea  Post Operative Diagnoses:  Obstructive Sleep Apnea           Procedure:  1. Drug Induced Sleep endoscopy (76456)       Surgeon: Josue Moran DO  . In the hypopharynx, a very large, tongue base is observed in complete anterior-posterior retrolingual/retroepiglottic obstruction. In summary, there is no evidence of complete concentric palatal obstruction and does appear to be a candidate anatomically for hypoglossal nerve stimulation therapy. Inspire implantation  Date of Operation:   10/02/23     Pre-operative Diagnosis:   Obstructive sleep apnea (adult) (pediatric) [G47.33]      Post-operative Diagnosis:   same     Operative Procedure:   Procedure(s):  INSPIRE DEVICE PLACEMENT (N/A)      Attending Surgeon:   Josue Moran DO        Initial follow-up after inspire implantation on 2023  Neurostimulator Reprogramming  Approximately 20 minutes was spent analyzing the airway and programming the device.   Sensing voltage:  0.8 volts  Starting amplitude: 0.8 volts with the default [+ - +]

## 2024-01-09 ENCOUNTER — OFFICE VISIT (OUTPATIENT)
Dept: PULMONOLOGY | Age: 74
End: 2024-01-09
Payer: MEDICARE

## 2024-01-09 DIAGNOSIS — Z96.82 S/P INSERTION OF HYPOGLOSSAL NERVE STIMULATOR: ICD-10-CM

## 2024-01-09 DIAGNOSIS — G47.52 RBD (REM BEHAVIORAL DISORDER): ICD-10-CM

## 2024-01-09 DIAGNOSIS — G47.33 OSA (OBSTRUCTIVE SLEEP APNEA): Primary | ICD-10-CM

## 2024-01-09 DIAGNOSIS — E66.3 OVERWEIGHT (BMI 25.0-29.9): ICD-10-CM

## 2024-01-09 DIAGNOSIS — I10 BENIGN ESSENTIAL HTN: ICD-10-CM

## 2024-01-09 PROCEDURE — 1123F ACP DISCUSS/DSCN MKR DOCD: CPT | Performed by: INTERNAL MEDICINE

## 2024-01-09 PROCEDURE — G8484 FLU IMMUNIZE NO ADMIN: HCPCS | Performed by: INTERNAL MEDICINE

## 2024-01-09 PROCEDURE — G8417 CALC BMI ABV UP PARAM F/U: HCPCS | Performed by: INTERNAL MEDICINE

## 2024-01-09 PROCEDURE — 3017F COLORECTAL CA SCREEN DOC REV: CPT | Performed by: INTERNAL MEDICINE

## 2024-01-09 PROCEDURE — 99215 OFFICE O/P EST HI 40 MIN: CPT | Performed by: INTERNAL MEDICINE

## 2024-01-09 PROCEDURE — 1036F TOBACCO NON-USER: CPT | Performed by: INTERNAL MEDICINE

## 2024-01-09 PROCEDURE — 95976 ALYS SMPL CN NPGT PRGRMG: CPT | Performed by: INTERNAL MEDICINE

## 2024-01-09 PROCEDURE — G8427 DOCREV CUR MEDS BY ELIG CLIN: HCPCS | Performed by: INTERNAL MEDICINE

## 2024-01-09 NOTE — PATIENT INSTRUCTIONS
ASSESSMENT/PLAN:   Diagnosis Orders   1. HOMER (obstructive sleep apnea)        2. S/P insertion of hypoglossal nerve stimulator        3. RBD (REM behavioral disorder)        4. Benign essential HTN        5. Overweight (BMI 25.0-29.9)                I  RECOMMENDATIONS:     Advised to avoid driving when too sleepy to function safely and given a discussion of the risks of untreated apnea such as accidents, cognitive impairment, mood impairment, high blood pressure, various cardiac diseases and stroke. Weight loss was encouraged.     Sleep study to qualify for inspire          Patient was then placed on a CPAP and titrated  Patient tried CPAP but was unable to tolerate      Drug-induced sleep endoscopy  Date of Procedure: 06/26/23  Time: 0900     Pre Operative Diagnoses: Obstructive Sleep Apnea  Post Operative Diagnoses:  Obstructive Sleep Apnea           Procedure:  1. Drug Induced Sleep endoscopy (50436)       Surgeon: Gabi Renee DO  .  In the hypopharynx, a very large, tongue base is observed in complete anterior-posterior retrolingual/retroepiglottic obstruction.  In summary, there is no evidence of complete concentric palatal obstruction and does appear to be a candidate anatomically for hypoglossal nerve stimulation therapy.       Inspire implantation  Date of Operation:   10/02/23     Pre-operative Diagnosis:   Obstructive sleep apnea (adult) (pediatric) [G47.33]      Post-operative Diagnosis:   same     Operative Procedure:   Procedure(s):  INSPIRE DEVICE PLACEMENT (N/A)      Attending Surgeon:   Gabi Renee DO        Initial follow-up after inspire implantation on 11/28/2023  Neurostimulator Reprogramming  Approximately 20 minutes was spent analyzing the airway and programming the device.  Sensing voltage:  0.8 volts  Starting amplitude: 0.8 volts with the default [+ - +] electrode configuration.  Starting Range:  Lower Limit: 0.8 volts  Upper Limit: 1.8 volts            Rate: Freq 33 hz  And 90 pulse

## 2024-01-09 NOTE — PROGRESS NOTES
Heriberto Rene (: 1950 ) is a 73 y.o. male here for an evaluation of   Chief Complaint   Patient presents with    Follow-up     Inspire 6 week post activation         ASSESSMENT/PLAN:   Diagnosis Orders   1. HOMER (obstructive sleep apnea)        2. S/P insertion of hypoglossal nerve stimulator        3. RBD (REM behavioral disorder)        4. Benign essential HTN        5. Overweight (BMI 25.0-29.9)                I  RECOMMENDATIONS:     Advised to avoid driving when too sleepy to function safely and given a discussion of the risks of untreated apnea such as accidents, cognitive impairment, mood impairment, high blood pressure, various cardiac diseases and stroke. Weight loss was encouraged.     Sleep study to qualify for inspire          Patient was then placed on a CPAP and titrated  Patient tried CPAP but was unable to tolerate      Drug-induced sleep endoscopy  Date of Procedure: 23  Time: 0900     Pre Operative Diagnoses: Obstructive Sleep Apnea  Post Operative Diagnoses:  Obstructive Sleep Apnea           Procedure:  1. Drug Induced Sleep endoscopy (08789)       Surgeon: Gabi Renee DO  .  In the hypopharynx, a very large, tongue base is observed in complete anterior-posterior retrolingual/retroepiglottic obstruction.  In summary, there is no evidence of complete concentric palatal obstruction and does appear to be a candidate anatomically for hypoglossal nerve stimulation therapy.       Inspire implantation  Date of Operation:   10/02/23     Pre-operative Diagnosis:   Obstructive sleep apnea (adult) (pediatric) [G47.33]      Post-operative Diagnosis:   same     Operative Procedure:   Procedure(s):  INSPIRE DEVICE PLACEMENT (N/A)      Attending Surgeon:   Gabi Renee DO        Initial follow-up after inspire implantation on 2023  Neurostimulator Reprogramming  Approximately 20 minutes was spent analyzing the airway and programming the device.  Sensing voltage:  0.8 volts  Starting

## 2024-01-29 ENCOUNTER — TELEPHONE (OUTPATIENT)
Dept: PULMONOLOGY | Age: 74
End: 2024-01-29

## 2024-01-29 NOTE — TELEPHONE ENCOUNTER
So I found out that [facility] does not have anyone to do Inspire and pt. Has to go to [facility]. I have sent information to [facility] and they will set up. Pt. Is aware.

## 2024-01-31 ENCOUNTER — TELEPHONE (OUTPATIENT)
Dept: PULMONOLOGY | Age: 74
End: 2024-01-31

## 2024-01-31 NOTE — TELEPHONE ENCOUNTER
Pt still has not heard from [facility] to be set up for sleep study post visit with Vivek about a month ago. I provided pt the number to the sleep study office in the [facility] and suggested he try calling them. ([phone]). Pt will call back if he has issues.

## 2024-02-10 DIAGNOSIS — F32.5 MAJOR DEPRESSIVE DISORDER WITH SINGLE EPISODE, IN FULL REMISSION (HCC): ICD-10-CM

## 2024-02-10 DIAGNOSIS — F42.8 OBSESSIVE THINKING: ICD-10-CM

## 2024-02-10 DIAGNOSIS — F41.9 ANXIETY: ICD-10-CM

## 2024-02-10 RX ORDER — CLOMIPRAMINE HYDROCHLORIDE 75 MG/1
CAPSULE ORAL
Qty: 180 CAPSULE | Refills: 1 | Status: SHIPPED | OUTPATIENT
Start: 2024-02-10

## 2024-02-14 ENCOUNTER — HOSPITAL ENCOUNTER (OUTPATIENT)
Dept: SLEEP CENTER | Age: 74
Discharge: HOME | End: 2024-02-16
Attending: INTERNAL MEDICINE
Payer: MEDICARE

## 2024-02-14 DIAGNOSIS — G47.52 RBD (REM BEHAVIORAL DISORDER): ICD-10-CM

## 2024-02-14 DIAGNOSIS — E66.3 OVERWEIGHT (BMI 25.0-29.9): ICD-10-CM

## 2024-02-14 DIAGNOSIS — Z96.82 S/P INSERTION OF HYPOGLOSSAL NERVE STIMULATOR: ICD-10-CM

## 2024-02-14 DIAGNOSIS — G47.33 OSA (OBSTRUCTIVE SLEEP APNEA): ICD-10-CM

## 2024-02-14 DIAGNOSIS — I10 BENIGN ESSENTIAL HTN: ICD-10-CM

## 2024-02-14 PROCEDURE — 95810 POLYSOM 6/> YRS 4/> PARAM: CPT

## 2024-02-15 ENCOUNTER — TELEPHONE (OUTPATIENT)
Dept: PULMONOLOGY | Age: 74
End: 2024-02-15

## 2024-02-15 NOTE — TELEPHONE ENCOUNTER
Pt had sleep study done last night and was calling to schedule follow up. Pt reports he has inspire. Please call pt to schedule next appt at [phone], thank you.

## 2024-03-05 SDOH — HEALTH STABILITY: PHYSICAL HEALTH: ON AVERAGE, HOW MANY DAYS PER WEEK DO YOU ENGAGE IN MODERATE TO STRENUOUS EXERCISE (LIKE A BRISK WALK)?: 3 DAYS

## 2024-03-05 SDOH — HEALTH STABILITY: PHYSICAL HEALTH: ON AVERAGE, HOW MANY MINUTES DO YOU ENGAGE IN EXERCISE AT THIS LEVEL?: 40 MIN

## 2024-03-05 ASSESSMENT — PATIENT HEALTH QUESTIONNAIRE - PHQ9
2. FEELING DOWN, DEPRESSED OR HOPELESS: 0
1. LITTLE INTEREST OR PLEASURE IN DOING THINGS: 0
SUM OF ALL RESPONSES TO PHQ QUESTIONS 1-9: 0
SUM OF ALL RESPONSES TO PHQ QUESTIONS 1-9: 0
SUM OF ALL RESPONSES TO PHQ9 QUESTIONS 1 & 2: 0
SUM OF ALL RESPONSES TO PHQ QUESTIONS 1-9: 0
SUM OF ALL RESPONSES TO PHQ QUESTIONS 1-9: 0

## 2024-03-05 ASSESSMENT — LIFESTYLE VARIABLES
HOW OFTEN DO YOU HAVE A DRINK CONTAINING ALCOHOL: 1
HOW MANY STANDARD DRINKS CONTAINING ALCOHOL DO YOU HAVE ON A TYPICAL DAY: 0
HOW OFTEN DO YOU HAVE SIX OR MORE DRINKS ON ONE OCCASION: 1
HOW MANY STANDARD DRINKS CONTAINING ALCOHOL DO YOU HAVE ON A TYPICAL DAY: PATIENT DOES NOT DRINK
HOW OFTEN DO YOU HAVE A DRINK CONTAINING ALCOHOL: NEVER

## 2024-03-06 ENCOUNTER — OFFICE VISIT (OUTPATIENT)
Dept: INTERNAL MEDICINE CLINIC | Age: 74
End: 2024-03-06

## 2024-03-06 VITALS
TEMPERATURE: 98.1 F | HEART RATE: 78 BPM | BODY MASS INDEX: 29.19 KG/M2 | OXYGEN SATURATION: 98 % | HEIGHT: 67 IN | WEIGHT: 186 LBS | SYSTOLIC BLOOD PRESSURE: 112 MMHG | DIASTOLIC BLOOD PRESSURE: 68 MMHG

## 2024-03-06 DIAGNOSIS — Z13.1 SCREENING FOR DIABETES MELLITUS: ICD-10-CM

## 2024-03-06 DIAGNOSIS — Z12.5 SCREENING FOR PROSTATE CANCER: ICD-10-CM

## 2024-03-06 DIAGNOSIS — R73.01 IMPAIRED FASTING GLUCOSE: ICD-10-CM

## 2024-03-06 DIAGNOSIS — F32.5 MAJOR DEPRESSIVE DISORDER WITH SINGLE EPISODE, IN FULL REMISSION (HCC): ICD-10-CM

## 2024-03-06 DIAGNOSIS — Z00.00 MEDICARE ANNUAL WELLNESS VISIT, SUBSEQUENT: ICD-10-CM

## 2024-03-06 DIAGNOSIS — I10 BENIGN ESSENTIAL HTN: ICD-10-CM

## 2024-03-06 DIAGNOSIS — G47.33 OSA (OBSTRUCTIVE SLEEP APNEA): ICD-10-CM

## 2024-03-06 DIAGNOSIS — Z00.00 MEDICARE ANNUAL WELLNESS VISIT, SUBSEQUENT: Primary | ICD-10-CM

## 2024-03-06 DIAGNOSIS — E78.5 DYSLIPIDEMIA: ICD-10-CM

## 2024-03-06 DIAGNOSIS — F19.11 HISTORY OF SUBSTANCE ABUSE (HCC): ICD-10-CM

## 2024-03-06 DIAGNOSIS — R26.89 BALANCE PROBLEM: ICD-10-CM

## 2024-03-06 SDOH — ECONOMIC STABILITY: INCOME INSECURITY: HOW HARD IS IT FOR YOU TO PAY FOR THE VERY BASICS LIKE FOOD, HOUSING, MEDICAL CARE, AND HEATING?: NOT HARD AT ALL

## 2024-03-06 SDOH — ECONOMIC STABILITY: FOOD INSECURITY: WITHIN THE PAST 12 MONTHS, THE FOOD YOU BOUGHT JUST DIDN'T LAST AND YOU DIDN'T HAVE MONEY TO GET MORE.: NEVER TRUE

## 2024-03-06 SDOH — ECONOMIC STABILITY: FOOD INSECURITY: WITHIN THE PAST 12 MONTHS, YOU WORRIED THAT YOUR FOOD WOULD RUN OUT BEFORE YOU GOT MONEY TO BUY MORE.: NEVER TRUE

## 2024-03-06 NOTE — PROGRESS NOTES
[facility]  Internal Medicine  Patient Encounter  Keith Valenzuela D.O., [facility]       Medicare Annual Wellness Visit      Heriberto Rene  3/6/2024    Subjective Heriberto Rene is here for Medicare AWV      Medical/Surgical Histories     Past Medical History:   Diagnosis Date    Acquired spondylolisthesis of lumbosacral region 02/08/2019    Alcoholism (HCC)     Anxiety     Basal cell cancer 08/2010    Dr. Bragg    Cataracts, bilateral 08/2023    Chronic bilateral low back pain without sciatica 02/08/2019    Closed displaced fracture of first metacarpal bone of right hand 12/20/2022    Residual extensor tendon rupture.    DDD (degenerative disc disease), lumbar 02/08/2019    Depression     DVT, lower extremity (HCC) 05/29/2015    Right, gastroc, soleal    GERD (gastroesophageal reflux disease)     Hyperlipidemia     Hypertension     Impaired fasting glucose 09/06/2023    Lumbar facet arthropathy 02/15/2019    Lumbar foraminal stenosis 02/15/2019    Medial meniscus tear 09/2023    HOMER (obstructive sleep apnea) 04/12/2023    Osteopenia     Sleep apnea     Squamous cell skin cancer 2017    right ear lobe    Stasis dermatitis     Varicose veins of both lower extremities     Venous insufficiency of both lower extremities           Past Surgical History:   Procedure Laterality Date    ANESTHESIA NERVE BLOCK Bilateral 03/25/2019    BILATERAL L3,L4,L5 DR MEDIAL BRANCH BLOCKS WITH FLUOROSCOPY performed by Janie Damon MD at [facility]    COLONOSCOPY      FACIAL RECONSTRUCTION SURGERY      from [facility]    HAND SURGERY  12/27/2022    ORIF right 1st metacarpal, Dr. Glaser    KNEE ARTHROSCOPY Right 09/2023    [facility]    LARYNGOSCOPY N/A 06/26/2023    DRUG INDUCED SLEEP ENDOSCOPY performed by Gabi Renee DO at [facility] OR    LUMBAR SPINE SURGERY Bilateral 03/04/2019    BILATERAL L3, L4, L5 DORSAL RAMUS LUMBAR MEDIAL BRANCH BLOCK WITH FLUOROSCOPY performed by Janie Damon MD at [facility]    LUMBAR SPINE SURGERY

## 2024-03-06 NOTE — PATIENT INSTRUCTIONS
Personalized Preventive Plan for Heriberto Rene - 3/6/2024  Medicare offers a range of preventive health benefits. Some of the tests and screenings are paid in full while other may be subject to a deductible, co-insurance, and/or copay.    Some of these benefits include a comprehensive review of your medical history including lifestyle, illnesses that may run in your family, and various assessments and screenings as appropriate.    After reviewing your medical record and screening and assessments performed today your provider may have ordered immunizations, labs, imaging, and/or referrals for you.  A list of these orders (if applicable) as well as your Preventive Care list are included within your After Visit Summary for your review.    Other Preventive Recommendations:    A preventive eye exam performed by an eye specialist is recommended every 1-2 years to screen for glaucoma; cataracts, macular degeneration, and other eye disorders.  A preventive dental visit is recommended every 6 months.  Try to get at least 150 minutes of exercise per week or 10,000 steps per day on a pedometer .  Order or download the FREE \"Exercise & Physical Activity: Your Everyday Guide\" from [employer]. Call 1-973.108.3287 or search [employer] online.  You need 0949-0103 mg of calcium and 3461-7982 IU of vitamin D per day. It is possible to meet your calcium requirement with diet alone, but a vitamin D supplement is usually necessary to meet this goal.  When exposed to the sun, use a sunscreen that protects against both UVA and UVB radiation with an SPF of 30 or greater. Reapply every 2 to 3 hours or after sweating, drying off with a towel, or swimming.  Always wear a seat belt when traveling in a car. Always wear a helmet when riding a bicycle or motorcycle.     Preventing Falls: Care Instructions  Injuries and health problems such as trouble walking or poor eyesight can increase your risk of

## 2024-03-07 LAB
ALBUMIN SERPL-MCNC: 4.6 G/DL (ref 3.4–5)
ALBUMIN/GLOB SERPL: 1.8 {RATIO} (ref 1.1–2.2)
ALP SERPL-CCNC: 81 U/L (ref 40–129)
ALT SERPL-CCNC: 16 U/L (ref 10–40)
ANION GAP SERPL CALCULATED.3IONS-SCNC: 10 MMOL/L (ref 3–16)
AST SERPL-CCNC: 23 U/L (ref 15–37)
BASOPHILS # BLD: 0.1 K/UL (ref 0–0.2)
BASOPHILS NFR BLD: 0.8 %
BILIRUB SERPL-MCNC: 0.5 MG/DL (ref 0–1)
BUN SERPL-MCNC: 16 MG/DL (ref 7–20)
CALCIUM SERPL-MCNC: 9.5 MG/DL (ref 8.3–10.6)
CHLORIDE SERPL-SCNC: 103 MMOL/L (ref 99–110)
CHOLEST SERPL-MCNC: 139 MG/DL (ref 0–199)
CO2 SERPL-SCNC: 25 MMOL/L (ref 21–32)
CREAT SERPL-MCNC: 1.2 MG/DL (ref 0.8–1.3)
DEPRECATED RDW RBC AUTO: 15 % (ref 12.4–15.4)
EOSINOPHIL # BLD: 0.7 K/UL (ref 0–0.6)
EOSINOPHIL NFR BLD: 7.1 %
EST. AVERAGE GLUCOSE BLD GHB EST-MCNC: 93.9 MG/DL
GFR SERPLBLD CREATININE-BSD FMLA CKD-EPI: >60 ML/MIN/{1.73_M2}
GLUCOSE SERPL-MCNC: 104 MG/DL (ref 70–99)
HBA1C MFR BLD: 4.9 %
HCT VFR BLD AUTO: 36.5 % (ref 40.5–52.5)
HDLC SERPL-MCNC: 48 MG/DL (ref 40–60)
HGB BLD-MCNC: 12.5 G/DL (ref 13.5–17.5)
LDLC SERPL CALC-MCNC: 73 MG/DL
LYMPHOCYTES # BLD: 2.1 K/UL (ref 1–5.1)
LYMPHOCYTES NFR BLD: 22.7 %
MCH RBC QN AUTO: 34.5 PG (ref 26–34)
MCHC RBC AUTO-ENTMCNC: 34.1 G/DL (ref 31–36)
MCV RBC AUTO: 101.1 FL (ref 80–100)
MONOCYTES # BLD: 0.5 K/UL (ref 0–1.3)
MONOCYTES NFR BLD: 5.1 %
NEUTROPHILS # BLD: 6 K/UL (ref 1.7–7.7)
NEUTROPHILS NFR BLD: 64.3 %
PLATELET # BLD AUTO: 347 K/UL (ref 135–450)
PMV BLD AUTO: 8.1 FL (ref 5–10.5)
POTASSIUM SERPL-SCNC: 5.1 MMOL/L (ref 3.5–5.1)
PROT SERPL-MCNC: 7.2 G/DL (ref 6.4–8.2)
PSA SERPL DL<=0.01 NG/ML-MCNC: 0.1 NG/ML (ref 0–4)
RBC # BLD AUTO: 3.61 M/UL (ref 4.2–5.9)
SODIUM SERPL-SCNC: 138 MMOL/L (ref 136–145)
TRIGL SERPL-MCNC: 89 MG/DL (ref 0–150)
TSH SERPL DL<=0.005 MIU/L-ACNC: 2.02 UIU/ML (ref 0.27–4.2)
VLDLC SERPL CALC-MCNC: 18 MG/DL
WBC # BLD AUTO: 9.3 K/UL (ref 4–11)

## 2024-03-14 ENCOUNTER — OFFICE VISIT (OUTPATIENT)
Dept: PULMONOLOGY | Age: 74
End: 2024-03-14
Payer: MEDICARE

## 2024-03-14 DIAGNOSIS — G47.52 RBD (REM BEHAVIORAL DISORDER): ICD-10-CM

## 2024-03-14 DIAGNOSIS — G47.33 OSA (OBSTRUCTIVE SLEEP APNEA): Primary | ICD-10-CM

## 2024-03-14 DIAGNOSIS — Z96.82 S/P INSERTION OF HYPOGLOSSAL NERVE STIMULATOR: ICD-10-CM

## 2024-03-14 DIAGNOSIS — I10 BENIGN ESSENTIAL HTN: ICD-10-CM

## 2024-03-14 DIAGNOSIS — E66.3 OVERWEIGHT (BMI 25.0-29.9): ICD-10-CM

## 2024-03-14 PROCEDURE — 1123F ACP DISCUSS/DSCN MKR DOCD: CPT | Performed by: INTERNAL MEDICINE

## 2024-03-14 PROCEDURE — G8484 FLU IMMUNIZE NO ADMIN: HCPCS | Performed by: INTERNAL MEDICINE

## 2024-03-14 PROCEDURE — 3017F COLORECTAL CA SCREEN DOC REV: CPT | Performed by: INTERNAL MEDICINE

## 2024-03-14 PROCEDURE — G8427 DOCREV CUR MEDS BY ELIG CLIN: HCPCS | Performed by: INTERNAL MEDICINE

## 2024-03-14 PROCEDURE — 95976 ALYS SMPL CN NPGT PRGRMG: CPT | Performed by: INTERNAL MEDICINE

## 2024-03-14 PROCEDURE — G8417 CALC BMI ABV UP PARAM F/U: HCPCS | Performed by: INTERNAL MEDICINE

## 2024-03-14 PROCEDURE — 1036F TOBACCO NON-USER: CPT | Performed by: INTERNAL MEDICINE

## 2024-03-14 PROCEDURE — 99214 OFFICE O/P EST MOD 30 MIN: CPT | Performed by: INTERNAL MEDICINE

## 2024-03-14 ASSESSMENT — ENCOUNTER SYMPTOMS
EYES NEGATIVE: 1
RESPIRATORY NEGATIVE: 1
GASTROINTESTINAL NEGATIVE: 1
ALLERGIC/IMMUNOLOGIC NEGATIVE: 1

## 2024-03-14 NOTE — PROGRESS NOTES
reviewed.   Constitutional:       General: He is not in acute distress.     Appearance: Normal appearance. He is not ill-appearing.   HENT:      Head: Normocephalic and atraumatic.      Right Ear: External ear normal.      Left Ear: External ear normal.      Nose: Nose normal.      Mouth/Throat:      Mouth: Mucous membranes are moist.      Pharynx: Oropharynx is clear.      Comments: Mallampati 3  Inspire incision at the right jaw looks clean and well-healed  Eyes:      General: No scleral icterus.     Extraocular Movements: Extraocular movements intact.      Conjunctiva/sclera: Conjunctivae normal.      Pupils: Pupils are equal, round, and reactive to light.   Cardiovascular:      Rate and Rhythm: Normal rate and regular rhythm.      Pulses: Normal pulses.      Heart sounds: Normal heart sounds. No murmur heard.     No friction rub.   Pulmonary:      Effort: No respiratory distress.      Breath sounds: No wheezing or rales.      Comments: Inspire incision at the right chest wall looks clean and well-healed  Abdominal:      General: Abdomen is flat. Bowel sounds are normal. There is no distension.      Tenderness: There is no abdominal tenderness. There is no guarding.   Musculoskeletal:         General: No swelling or tenderness. Normal range of motion.      Cervical back: Normal range of motion and neck supple. No rigidity.   Skin:     General: Skin is warm and dry.      Coloration: Skin is not jaundiced.   Neurological:      General: No focal deficit present.      Mental Status: He is alert and oriented to person, place, and time. Mental status is at baseline.      Cranial Nerves: No cranial nerve deficit.      Sensory: No sensory deficit.      Motor: No weakness.      Gait: Gait normal.   Psychiatric:         Mood and Affect: Mood normal.         Thought Content: Thought content normal.         Judgment: Judgment normal.              GLORIA KUMAR MD

## 2024-03-14 NOTE — PATIENT INSTRUCTIONS
ASSESSMENT/PLAN:   Diagnosis Orders   1. HOMER (obstructive sleep apnea)        2. S/P insertion of hypoglossal nerve stimulator        3. RBD (REM behavioral disorder)        4. Benign essential HTN        5. Overweight (BMI 25.0-29.9)                I  RECOMMENDATIONS:     Advised to avoid driving when too sleepy to function safely and given a discussion of the risks of untreated apnea such as accidents, cognitive impairment, mood impairment, high blood pressure, various cardiac diseases and stroke. Weight loss was encouraged.     Sleep study to qualify for inspire          Patient was then placed on a CPAP and titrated  Patient tried CPAP but was unable to tolerate      Drug-induced sleep endoscopy  Date of Procedure: 06/26/23  Time: 0900     Pre Operative Diagnoses: Obstructive Sleep Apnea  Post Operative Diagnoses:  Obstructive Sleep Apnea           Procedure:  1. Drug Induced Sleep endoscopy (75245)       Surgeon: Gabi Renee DO  .  In the hypopharynx, a very large, tongue base is observed in complete anterior-posterior retrolingual/retroepiglottic obstruction.  In summary, there is no evidence of complete concentric palatal obstruction and does appear to be a candidate anatomically for hypoglossal nerve stimulation therapy.       Inspire implantation  Date of Operation:   10/02/23     Pre-operative Diagnosis:   Obstructive sleep apnea (adult) (pediatric) [G47.33]      Post-operative Diagnosis:   same     Operative Procedure:   Procedure(s):  INSPIRE DEVICE PLACEMENT (N/A)      Attending Surgeon:   Gabi Renee DO        Initial follow-up after inspire implantation on 11/28/2023  Neurostimulator Reprogramming  Approximately 20 minutes was spent analyzing the airway and programming the device.  Sensing voltage:  0.8 volts  Starting amplitude: 0.8 volts with the default [+ - +] electrode configuration.  Starting Range:  Lower Limit: 0.8 volts  Upper Limit: 1.8 volts            Rate: Freq 33 hz  And 90 pulse

## 2024-05-01 ENCOUNTER — OFFICE VISIT (OUTPATIENT)
Dept: INTERNAL MEDICINE CLINIC | Age: 74
End: 2024-05-01

## 2024-05-01 VITALS
WEIGHT: 180 LBS | DIASTOLIC BLOOD PRESSURE: 56 MMHG | OXYGEN SATURATION: 97 % | HEART RATE: 77 BPM | SYSTOLIC BLOOD PRESSURE: 100 MMHG | BODY MASS INDEX: 28.62 KG/M2

## 2024-05-01 DIAGNOSIS — E78.5 DYSLIPIDEMIA: ICD-10-CM

## 2024-05-01 DIAGNOSIS — Z01.818 PREOP EXAM FOR INTERNAL MEDICINE: Primary | ICD-10-CM

## 2024-05-01 DIAGNOSIS — I10 BENIGN ESSENTIAL HTN: ICD-10-CM

## 2024-05-01 DIAGNOSIS — D53.9 MACROCYTIC ANEMIA: ICD-10-CM

## 2024-05-01 DIAGNOSIS — G47.33 OSA (OBSTRUCTIVE SLEEP APNEA): ICD-10-CM

## 2024-05-01 DIAGNOSIS — F41.9 ANXIETY: ICD-10-CM

## 2024-05-01 DIAGNOSIS — I87.2 VENOUS INSUFFICIENCY OF BOTH LOWER EXTREMITIES: ICD-10-CM

## 2024-05-01 DIAGNOSIS — H26.9 CATARACT OF BOTH EYES, UNSPECIFIED CATARACT TYPE: ICD-10-CM

## 2024-05-01 NOTE — PATIENT INSTRUCTIONS
Avoid NSAID's (Motrin, Aleve, Advil, Ibuprofen),  vitamin supplements and fish oil 1 week prior to procedure    Push fluids-- Gatorade, Powerade, water    Monitor BP at home-- report readings in 2 weeks.

## 2024-05-01 NOTE — PROGRESS NOTES
Risk (intraperitoneal, intrathoracic, HENT, orthopedic, or carotid endarterectomy, etc.)  Revised Cardiac Risk Index Risk factors: None  Measurement of Exercise Tolerance before Surgery >4 Yes    According to the 2014 ACC/AHA pre-operative risk assessment guidelines Heriberto Rene is a low risk for major cardiac complications during a low risk procedure and may continue as planned. Specific medication recommendations are listed below. Medications recommended to continue should be taken with a sip of water even when NPO.     Further recommendations from consultants: None    Medication Recommendations:  Betablocker should be continued the day of surgery         Encounter Diagnoses   Name Primary?    Preop exam for internal medicine Yes    Cataract of both eyes, unspecified cataract type     Benign essential HTN-- Lower BP today.  Asymptomatic     Macrocytic anemia     Dyslipidemia     HOMER (obstructive sleep apnea)     Venous insufficiency of both lower extremities     Anxiety          Plan:  Acceptable risk for the planned low risk procedure.  No contraindications at this time    EKG--Not needed for this procedure  LAB-- Repeat lab not needed for this procedure    Pt will avoid NSAID's, OTC vitamin supplements and fish oil 1 week prior to procedure      Pt will push fluids    Check BP at home and report in 2 weeks.      On this date 5/1/2024 I have spent 30 minutes reviewing previous notes, test results and face to face with the patient discussing the diagnosis and importance of compliance with the treatment plan as well as documenting on the day of the visit.           Electronically Signed:  Keith Valenzuela D.O

## 2024-05-15 RX ORDER — FENOFIBRATE 160 MG/1
TABLET ORAL
Qty: 90 TABLET | Refills: 3 | Status: SHIPPED | OUTPATIENT
Start: 2024-05-15

## 2024-06-03 ENCOUNTER — TELEPHONE (OUTPATIENT)
Dept: INTERNAL MEDICINE CLINIC | Age: 74
End: 2024-06-03

## 2024-06-03 NOTE — TELEPHONE ENCOUNTER
I called patient and he would like to be seen tomorrow. I have scheduled him for 4:45pm and he stated he will be here a little earlier than that.

## 2024-06-03 NOTE — TELEPHONE ENCOUNTER
Patient called for Dr. Valenuzela to see what his next steps are, patient has been having cold like symptoms with a sore throat. Patient stated no headaches, no SOB, no ear pain, no Drainage, temperature is still normal at 98. Patient does still have a bit of a cough, cough is getting better, has spit up some yellow and green mucous when he does cough. Also mentioned not having head/chest congestion, and have tried OTC medication delsym to help with the cough.     Patient is wanting to know if he should come in today or tomorrow to be seen or just have a antibiotic sent in to his preferred pharmacy [facility] in [place], please advise.     Patients callback# [phone]

## 2024-06-03 NOTE — TELEPHONE ENCOUNTER
In all likelihood I will not be able to see him any earlier than his time slot.  I will likely be late

## 2024-06-03 NOTE — TELEPHONE ENCOUNTER
Patient called back in to let Dr. Valenzuela know he did take a at home covid test and it came back negative.

## 2024-06-03 NOTE — TELEPHONE ENCOUNTER
Unable to call in antibiotics without being seen.  You can add him on to the end of the day tomorrow.  Otherwise he can use over-the-counter Coricidin HBP, nasal saline spray 3 sprays 3 times a day for moisture and irrigation, and Flonase nasal spray nightly 2 sprays each nostril.  For cough he can use Mucinex DM

## 2024-06-04 ENCOUNTER — OFFICE VISIT (OUTPATIENT)
Dept: INTERNAL MEDICINE CLINIC | Age: 74
End: 2024-06-04

## 2024-06-04 VITALS
TEMPERATURE: 98.9 F | DIASTOLIC BLOOD PRESSURE: 72 MMHG | HEIGHT: 67 IN | BODY MASS INDEX: 28.72 KG/M2 | WEIGHT: 183 LBS | OXYGEN SATURATION: 99 % | SYSTOLIC BLOOD PRESSURE: 144 MMHG | HEART RATE: 85 BPM

## 2024-06-04 DIAGNOSIS — J04.0 LARYNGITIS: ICD-10-CM

## 2024-06-04 DIAGNOSIS — J06.9 UPPER RESPIRATORY TRACT INFECTION, UNSPECIFIED TYPE: Primary | ICD-10-CM

## 2024-06-04 RX ORDER — ALBUTEROL SULFATE 90 UG/1
2 AEROSOL, METERED RESPIRATORY (INHALATION) 4 TIMES DAILY PRN
Qty: 1 EACH | Refills: 0 | Status: SHIPPED | OUTPATIENT
Start: 2024-06-04

## 2024-06-04 RX ORDER — METHYLPREDNISOLONE 4 MG/1
4 TABLET ORAL SEE ADMIN INSTRUCTIONS
Qty: 1 KIT | Refills: 1 | Status: SHIPPED | OUTPATIENT
Start: 2024-06-04 | End: 2024-06-10

## 2024-06-04 RX ORDER — DOXYCYCLINE HYCLATE 100 MG
100 TABLET ORAL 2 TIMES DAILY
Qty: 14 TABLET | Refills: 0 | Status: SHIPPED | OUTPATIENT
Start: 2024-06-04 | End: 2024-06-11

## 2024-06-04 NOTE — PROGRESS NOTES
Molecular, with COVID-19        Advised patient to call should symptoms persist, worsen or if new symptoms develop      Discussed medications with patient who voiced understanding of their use, indication and potential side effects.  Pt also understands the above recommendations.   All questions answered.    This note was generated completely or in part utilizing Dragon dictation speech recognition software.  Occasionally, words are mistranscribed and despite editing, the text may contain inaccuracies due to incorrect word recognition.  If further clarification is needed please contact the office at [phone]       Electronically signed    Keith Valenzuela D.O.

## 2024-06-05 LAB
REPORT: NORMAL
RESP PATH DNA+RNA PNL NPH NAA+NON-PROBE: NORMAL

## 2024-06-17 ENCOUNTER — TELEPHONE (OUTPATIENT)
Dept: INTERNAL MEDICINE CLINIC | Age: 74
End: 2024-06-17

## 2024-06-17 NOTE — TELEPHONE ENCOUNTER
Maria Antonia from [facility] [place] called in for Dr. Valenzuela in regards to getting the patients last office notes from 6/3 with signature stating the patient is cleared for left eye cataract surgery on 6/25/2024. Since there has been a change in health since last pre-op exam they need the last OV notes and clearance, please advise.    Maria Antonia fax# [phone]

## 2024-06-17 NOTE — TELEPHONE ENCOUNTER
No change in his health is she referring to?  Okay to print out his H&P and I will sign if patient states that there have been no new changes.

## 2024-06-18 NOTE — TELEPHONE ENCOUNTER
Per last msg , Maria Antonia from [facility] was calling to see if  would be willing to addend the pre op notes from 6.4.24 to state patient is still cleared for surgery even after having mild respiratory/cold sx.     Please advise.

## 2024-06-19 NOTE — TELEPHONE ENCOUNTER
Called patient back and patient stated he is doing better have completed his meds, still having a lingering cough, was told the cough could last 3 or 4 weeks per Dr. Valenzuela. If there is anything Dr. Valenzuela need him to do to get rid of the cough let him know.

## 2024-06-28 ENCOUNTER — TELEPHONE (OUTPATIENT)
Dept: INTERNAL MEDICINE CLINIC | Age: 74
End: 2024-06-28

## 2024-06-28 NOTE — TELEPHONE ENCOUNTER
----- Message from Keith Valenzuela DO sent at 6/28/2024  5:53 PM EDT -----  Regarding: FW: Follow up question.  Contact: [phone]        ----- Message -----  From: Malu Hinton MA  Sent: 6/28/2024   7:50 AM EDT  To: Keith Valenzuela DO  Subject: FW: Follow up question.                            ----- Message -----  From: Heriberto Rene \"Don\"  Sent: 6/27/2024   8:02 PM EDT  To: [facility]  Subject: Follow up question.                              Dear Doctor Keith.Just to let you know that my second cataract surgery on the left eye went well.I am done with all meds from my last visit,including the albuterol inhaler.Still having a persistent lingering cough after about 1 month.Absolutely no other symptoms at all,except for the cough.Is there anything else you can do for this cough,or should I just continue to wait it out.Thanks much.

## 2024-07-01 ENCOUNTER — OFFICE VISIT (OUTPATIENT)
Dept: INTERNAL MEDICINE CLINIC | Age: 74
End: 2024-07-01

## 2024-07-01 VITALS
BODY MASS INDEX: 28.09 KG/M2 | HEART RATE: 74 BPM | WEIGHT: 179 LBS | OXYGEN SATURATION: 98 % | SYSTOLIC BLOOD PRESSURE: 136 MMHG | HEIGHT: 67 IN | DIASTOLIC BLOOD PRESSURE: 76 MMHG

## 2024-07-01 DIAGNOSIS — J04.0 LARYNGITIS: ICD-10-CM

## 2024-07-01 DIAGNOSIS — J06.9 UPPER RESPIRATORY TRACT INFECTION, UNSPECIFIED TYPE: ICD-10-CM

## 2024-07-01 DIAGNOSIS — J45.998 POST-VIRAL REACTIVE AIRWAY DISEASE: Primary | ICD-10-CM

## 2024-07-01 RX ORDER — PREDNISONE 10 MG/1
TABLET ORAL
Qty: 31 TABLET | Refills: 0 | Status: SHIPPED | OUTPATIENT
Start: 2024-07-01 | End: 2024-07-11

## 2024-07-01 RX ORDER — BENZONATATE 200 MG/1
200 CAPSULE ORAL 3 TIMES DAILY PRN
Qty: 30 CAPSULE | Refills: 0 | Status: SHIPPED | OUTPATIENT
Start: 2024-07-01

## 2024-07-01 NOTE — PROGRESS NOTES
[facility]  Internal Medicine  Patient Encounter  Keith Valenzuela D.O., [facility]        Chief Complaint   Patient presents with    URI     Still has a cough        HPI: 74 y.o. male seen today with complaint of a persistent cough.  Patient was evaluated on 6/4/2024 with complaint of sore throat, cough, chest congestion that started about 7 to 10 days prior to presentation (end of May).  Symptoms had initially started with a dry cough.  He had developed worsening sore throat and hoarseness.  A home COVID test was negative.  He did have any fever.  He was coughing up some yellow-green sputum.  He did not have a wheezing or shortness of breath.  He was afebrile.  He did have some scattered end expiratory wheezing that worsened with forced expiration.  He was treated for an upper respiratory tract infection and laryngitis.  He was empirically given doxycycline and a Medrol Dosepak.  A respiratory molecular panel including COVID-19 was sent.  This was negative.  Patient had sent an email on 6/27/2004 indicating that he had a lingering cough.    Patient continues to have reactive coughing.  His cough is worse with prolonged talking and laughing.  If he coughs hard enough he is able to produce some sputum that remains yellowish-green in color.  He denies any fever, chills, sweats.  Overall he states he feels quite well.  Patient states that the albuterol really does not help much.  He denies any shortness of breath.      Past Medical History:   Diagnosis Date    Acquired spondylolisthesis of lumbosacral region 02/08/2019    Alcoholism ([facility])     Anxiety     Basal cell cancer 08/2010    Dr. Bragg    Cataracts, bilateral 08/2023    Chronic bilateral low back pain without sciatica 02/08/2019    Closed displaced fracture of first metacarpal bone of right hand 12/20/2022    Residual extensor tendon rupture.    DDD (degenerative disc disease), lumbar 02/08/2019    Depression     DVT, lower extremity ([facility]) 05/29/2015

## 2024-07-13 DIAGNOSIS — J32.0 CHRONIC LEFT MAXILLARY SINUSITIS: ICD-10-CM

## 2024-07-13 DIAGNOSIS — J34.3 HYPERTROPHY OF NASAL TURBINATES: ICD-10-CM

## 2024-07-15 RX ORDER — FLUTICASONE PROPIONATE 50 MCG
SPRAY, SUSPENSION (ML) NASAL
Qty: 16 G | Refills: 5 | OUTPATIENT
Start: 2024-07-15

## 2024-08-05 ENCOUNTER — E-VISIT (OUTPATIENT)
Dept: INTERNAL MEDICINE CLINIC | Age: 74
End: 2024-08-05
Payer: MEDICARE

## 2024-08-05 DIAGNOSIS — J06.9 URI WITH COUGH AND CONGESTION: Primary | ICD-10-CM

## 2024-08-05 DIAGNOSIS — U07.1 COVID-19 VIRUS INFECTION: Primary | ICD-10-CM

## 2024-08-05 PROCEDURE — 99421 OL DIG E/M SVC 5-10 MIN: CPT | Performed by: INTERNAL MEDICINE

## 2024-08-05 ASSESSMENT — LIFESTYLE VARIABLES: SMOKING_STATUS: NO, I'VE NEVER SMOKED

## 2024-08-05 NOTE — PROGRESS NOTES
HPI: as per patient provided history  Exam: N/A (electronic visit)  ASSESSMENT/PLAN:  Diagnoses and all orders for this visit:    URI with cough and congestion      See patient message    Patient instructed to call the office if worsens, or fails to improve as anticipated.    5-10 (10 minutes) minutes were spent on the digital evaluation and management of this patient.    Keith Valenzuela, DO

## 2024-08-10 DIAGNOSIS — F32.5 MAJOR DEPRESSIVE DISORDER WITH SINGLE EPISODE, IN FULL REMISSION (HCC): ICD-10-CM

## 2024-08-10 DIAGNOSIS — F41.9 ANXIETY: ICD-10-CM

## 2024-08-10 DIAGNOSIS — F42.8 OBSESSIVE THINKING: ICD-10-CM

## 2024-08-11 RX ORDER — CLOMIPRAMINE HYDROCHLORIDE 75 MG/1
CAPSULE ORAL
Qty: 180 CAPSULE | Refills: 3 | Status: SHIPPED | OUTPATIENT
Start: 2024-08-11

## 2024-08-13 DIAGNOSIS — R05.3 CHRONIC COUGH: Primary | ICD-10-CM

## 2024-08-14 ENCOUNTER — TELEPHONE (OUTPATIENT)
Dept: PULMONOLOGY | Age: 74
End: 2024-08-14

## 2024-08-14 ENCOUNTER — TELEPHONE (OUTPATIENT)
Dept: INTERNAL MEDICINE CLINIC | Age: 74
End: 2024-08-14

## 2024-08-14 DIAGNOSIS — R06.09 DOE (DYSPNEA ON EXERTION): Primary | ICD-10-CM

## 2024-08-14 NOTE — TELEPHONE ENCOUNTER
Patient referred for chronic cough   Has no chest imaging   Would you like a chest x-ray or PFT prior?    Pended if appropriate

## 2024-08-14 NOTE — TELEPHONE ENCOUNTER
Pt called in for Dr. Valenzuela in regards to maybe getting an appt with Dr. Valenzuela on Friday to discuss next steps with his ongoing cough. Pt stated he did make an appointment with pulmonology for 9/11 with Lopez Caro  but would like to be advised or given recommendation on what he should do in the mean time. Pt also mentioned having a severe sore throat that started today and feels like the cough is getting deeper into his chest, and is still coughing up yellowish/greenish phlegm. Pt has not other sx besides the ones mentioned. Please advise pt next steps.

## 2024-08-14 NOTE — TELEPHONE ENCOUNTER
Probably needs to be reevaluated.  I will see him in the office.  Please add him on at the end the day Friday

## 2024-08-15 NOTE — TELEPHONE ENCOUNTER
Left detailed message on pt vm letting him know provider wants to see him in office regarding ongoing cough.   Made him aware we have scheduled him at 4pm tomorrow.     Requested call back to confirm,

## 2024-08-16 ENCOUNTER — OFFICE VISIT (OUTPATIENT)
Dept: INTERNAL MEDICINE CLINIC | Age: 74
End: 2024-08-16

## 2024-08-16 VITALS
OXYGEN SATURATION: 98 % | WEIGHT: 179 LBS | HEIGHT: 67 IN | SYSTOLIC BLOOD PRESSURE: 120 MMHG | BODY MASS INDEX: 28.09 KG/M2 | TEMPERATURE: 96.9 F | HEART RATE: 88 BPM | DIASTOLIC BLOOD PRESSURE: 70 MMHG

## 2024-08-16 DIAGNOSIS — R05.3 CHRONIC COUGH: ICD-10-CM

## 2024-08-16 DIAGNOSIS — J01.90 SUBACUTE SINUSITIS, UNSPECIFIED LOCATION: Primary | ICD-10-CM

## 2024-08-16 RX ORDER — LEVOFLOXACIN 500 MG/1
500 TABLET, FILM COATED ORAL DAILY
Qty: 7 TABLET | Refills: 0 | Status: SHIPPED | OUTPATIENT
Start: 2024-08-16 | End: 2024-08-23

## 2024-08-16 RX ORDER — GUAIFENESIN AND DEXTROMETHORPHAN HYDROBROMIDE 600; 30 MG/1; MG/1
1 TABLET, EXTENDED RELEASE ORAL 2 TIMES DAILY
COMMUNITY
Start: 2024-08-16

## 2024-08-16 RX ORDER — FLUTICASONE PROPIONATE 50 MCG
2 SPRAY, SUSPENSION (ML) NASAL DAILY
Qty: 16 G | Refills: 5 | Status: SHIPPED | OUTPATIENT
Start: 2024-08-16

## 2024-08-16 NOTE — PROGRESS NOTES
understands the above recommendations.   All questions answered.    This note was generated completely or in part utilizing Dragon dictation speech recognition software.  Occasionally, words are mistranscribed and despite editing, the text may contain inaccuracies due to incorrect word recognition.  If further clarification is needed please contact the office at [phone]       Electronically signed    Keith Valenzuela D.O.

## 2024-08-17 ENCOUNTER — HOSPITAL ENCOUNTER (EMERGENCY)
Age: 74
Discharge: HOME | End: 2024-08-17
Attending: EMERGENCY MEDICINE
Payer: MEDICARE

## 2024-08-17 ENCOUNTER — HOSPITAL ENCOUNTER (OUTPATIENT)
Age: 74
Discharge: HOME | End: 2024-08-17
Payer: MEDICARE

## 2024-08-17 ENCOUNTER — APPOINTMENT (OUTPATIENT)
Age: 74
End: 2024-08-17
Payer: MEDICARE

## 2024-08-17 VITALS
HEART RATE: 77 BPM | SYSTOLIC BLOOD PRESSURE: 122 MMHG | WEIGHT: 185.6 LBS | BODY MASS INDEX: 29.83 KG/M2 | HEIGHT: 66 IN | TEMPERATURE: 98.5 F | RESPIRATION RATE: 18 BRPM | DIASTOLIC BLOOD PRESSURE: 69 MMHG | OXYGEN SATURATION: 100 %

## 2024-08-17 DIAGNOSIS — S92.352A CLOSED FRACTURE OF BASE OF FIFTH METATARSAL BONE OF LEFT FOOT, INITIAL ENCOUNTER: Primary | ICD-10-CM

## 2024-08-17 DIAGNOSIS — R05.3 CHRONIC COUGH: ICD-10-CM

## 2024-08-17 PROCEDURE — 6370000000 HC RX 637 (ALT 250 FOR IP): Performed by: EMERGENCY MEDICINE

## 2024-08-17 PROCEDURE — 71046 X-RAY EXAM CHEST 2 VIEWS: CPT

## 2024-08-17 PROCEDURE — 99283 EMERGENCY DEPT VISIT LOW MDM: CPT

## 2024-08-17 PROCEDURE — 73630 X-RAY EXAM OF FOOT: CPT

## 2024-08-17 RX ORDER — NAPROXEN 500 MG/1
500 TABLET ORAL 2 TIMES DAILY WITH MEALS
Qty: 14 TABLET | Refills: 0 | Status: SHIPPED | OUTPATIENT
Start: 2024-08-17 | End: 2024-08-24

## 2024-08-17 RX ORDER — IBUPROFEN 600 MG/1
600 TABLET ORAL
Status: COMPLETED | OUTPATIENT
Start: 2024-08-17 | End: 2024-08-17

## 2024-08-17 RX ORDER — NAPROXEN 500 MG/1
500 TABLET ORAL 2 TIMES DAILY WITH MEALS
Qty: 14 TABLET | Refills: 0 | Status: SHIPPED | OUTPATIENT
Start: 2024-08-17 | End: 2024-08-17

## 2024-08-17 RX ADMIN — IBUPROFEN 600 MG: 600 TABLET, FILM COATED ORAL at 20:18

## 2024-08-17 ASSESSMENT — PAIN DESCRIPTION - FREQUENCY: FREQUENCY: CONTINUOUS

## 2024-08-17 ASSESSMENT — LIFESTYLE VARIABLES
HOW OFTEN DO YOU HAVE A DRINK CONTAINING ALCOHOL: PATIENT DECLINED
HOW MANY STANDARD DRINKS CONTAINING ALCOHOL DO YOU HAVE ON A TYPICAL DAY: PATIENT DECLINED

## 2024-08-17 ASSESSMENT — PAIN DESCRIPTION - ORIENTATION: ORIENTATION: LEFT;RIGHT

## 2024-08-17 ASSESSMENT — PAIN DESCRIPTION - LOCATION: LOCATION: FOOT;HAND

## 2024-08-17 ASSESSMENT — PAIN - FUNCTIONAL ASSESSMENT: PAIN_FUNCTIONAL_ASSESSMENT: 0-10

## 2024-08-17 ASSESSMENT — PAIN SCALES - GENERAL
PAINLEVEL_OUTOF10: 2
PAINLEVEL_OUTOF10: 10

## 2024-08-17 ASSESSMENT — PAIN DESCRIPTION - DESCRIPTORS: DESCRIPTORS: SHARP

## 2024-08-17 NOTE — ED TRIAGE NOTES
Patient presents wheelchair to room 10 accompanied by spouse w/ c/o RIGHT hand injury and LEFT foot injury s/p mechanical fall 30 min prior to arrival.

## 2024-08-18 NOTE — ED NOTES
Pt to ED s/p mechanical fall PTA with injury to left foot and right hand, states pain is mostly with lateral side to left foot with swelling noted, ice packs provided, pt states pain tolerable at this time, here for examination to make sure if anything is sprained/fractured. AAOx4, NAD, resp e/u on RA, bed locked lowest position, rails up x2, call light within reach, pt on BP cuff and Pulse ox, visitor at bedside.

## 2024-08-18 NOTE — DISCHARGE INSTRUCTIONS
----- Message from Alena Fitzgerald sent at 8/14/2018  3:48 PM CDT -----  Contact: [phone]/ self  Patient called in requesting to speak with you. Patient prefers to speak with a nurse. Please advise.    Please return for any concern

## 2024-08-18 NOTE — ED PROVIDER NOTES
EMERGENCY MEDICINE ATTENDING NOTE  Felice Palomares Jr., DO, FACEP, FAAEM        CHIEF COMPLAINT  Chief Complaint   Patient presents with    Foot Injury    Hand Injury        HISTORY OF PRESENT ILLNESS  Heriberto Rene is a 74 y.o. male who presents to the ED for evaluation of injury to his right hand and left foot.  Patient was walking up the steps to the garage stairs and stepped on a plastic lid that he did not see which caused him to slip.  Is having pain mostly to his left foot on the lateral aspect.  Has some abrasions to the right hand but states it really is not hurting at this time.  Did not hit his head or lose conscious.  Denies any other significant complaints or concerns.    Nursing/triage notes reviewed.  No other complaints, modifying factors or associated symptoms.     REVIEW OF SYSTEMS:  All systems are reviewed and are negative unless noted in the HPI.    PAST MEDICAL HISTORY  Past Medical History:   Diagnosis Date    Acquired spondylolisthesis of lumbosacral region 02/08/2019    Alcoholism ([facility])     Anxiety     Basal cell cancer 08/2010    Dr. Bragg    Cataracts, bilateral 08/2023    Chronic bilateral low back pain without sciatica 02/08/2019    Closed displaced fracture of first metacarpal bone of right hand 12/20/2022    Residual extensor tendon rupture.    DDD (degenerative disc disease), lumbar 02/08/2019    Depression     DVT, lower extremity ([facility]) 05/29/2015    Right, gastroc, soleal    GERD (gastroesophageal reflux disease)     Hyperlipidemia     Hypertension     Impaired fasting glucose 09/06/2023    Lumbar facet arthropathy 02/15/2019    Lumbar foraminal stenosis 02/15/2019    Medial meniscus tear 09/2023    HOMER (obstructive sleep apnea) 04/12/2023    Osteopenia     Sleep apnea     Squamous cell skin cancer 2017    right ear lobe    Stasis dermatitis     Varicose veins of both lower extremities     Venous insufficiency of both lower extremities        SURGICAL HISTORY  Past

## 2024-08-19 ENCOUNTER — TELEPHONE (OUTPATIENT)
Dept: ORTHOPEDIC SURGERY | Age: 74
End: 2024-08-19

## 2024-08-19 DIAGNOSIS — S92.352A CLOSED FRACTURE OF BASE OF FIFTH METATARSAL BONE OF LEFT FOOT: Primary | ICD-10-CM

## 2024-08-19 SDOH — HEALTH STABILITY: PHYSICAL HEALTH: ON AVERAGE, HOW MANY MINUTES DO YOU ENGAGE IN EXERCISE AT THIS LEVEL?: 90 MIN

## 2024-08-19 SDOH — HEALTH STABILITY: PHYSICAL HEALTH: ON AVERAGE, HOW MANY DAYS PER WEEK DO YOU ENGAGE IN MODERATE TO STRENUOUS EXERCISE (LIKE A BRISK WALK)?: 3 DAYS

## 2024-08-19 NOTE — TELEPHONE ENCOUNTER
I spoke with patient and he stated that he can walk on the foot and getting around ok right now.  We did make him at with us on 8/21/2024 @ 3pm KM.  Pt is aware

## 2024-08-19 NOTE — TELEPHONE ENCOUNTER
Appointment Request     Patient requesting earlier appointment: Yes  Appointment offered to patient: YES    Patient Contact Number: [phone]     ED REF 8/17 LT FOOT FX. REQ APPT FOR TODAY.    MAY BE REACHED AT THE ABOVE NUMBER

## 2024-08-21 ENCOUNTER — OFFICE VISIT (OUTPATIENT)
Age: 74
End: 2024-08-21
Payer: MEDICARE

## 2024-08-21 ENCOUNTER — HOSPITAL ENCOUNTER (OUTPATIENT)
Age: 74
Discharge: HOME | End: 2024-08-21
Payer: MEDICARE

## 2024-08-21 ENCOUNTER — TELEPHONE (OUTPATIENT)
Dept: ORTHOPEDIC SURGERY | Age: 74
End: 2024-08-21

## 2024-08-21 VITALS — WEIGHT: 185 LBS | HEIGHT: 66 IN | BODY MASS INDEX: 29.73 KG/M2

## 2024-08-21 DIAGNOSIS — R06.09 DOE (DYSPNEA ON EXERTION): ICD-10-CM

## 2024-08-21 DIAGNOSIS — S92.352A CLOSED FRACTURE OF BASE OF FIFTH METATARSAL BONE OF LEFT FOOT: ICD-10-CM

## 2024-08-21 DIAGNOSIS — S92.351A CLOSED FRACTURE OF BASE OF FIFTH METATARSAL BONE OF RIGHT FOOT, INITIAL ENCOUNTER: Primary | ICD-10-CM

## 2024-08-21 PROCEDURE — 1123F ACP DISCUSS/DSCN MKR DOCD: CPT | Performed by: ORTHOPAEDIC SURGERY

## 2024-08-21 PROCEDURE — G8417 CALC BMI ABV UP PARAM F/U: HCPCS | Performed by: ORTHOPAEDIC SURGERY

## 2024-08-21 PROCEDURE — 3017F COLORECTAL CA SCREEN DOC REV: CPT | Performed by: ORTHOPAEDIC SURGERY

## 2024-08-21 PROCEDURE — 1036F TOBACCO NON-USER: CPT | Performed by: ORTHOPAEDIC SURGERY

## 2024-08-21 PROCEDURE — 73630 X-RAY EXAM OF FOOT: CPT

## 2024-08-21 PROCEDURE — 99203 OFFICE O/P NEW LOW 30 MIN: CPT | Performed by: ORTHOPAEDIC SURGERY

## 2024-08-21 PROCEDURE — G8427 DOCREV CUR MEDS BY ELIG CLIN: HCPCS | Performed by: ORTHOPAEDIC SURGERY

## 2024-08-21 NOTE — TELEPHONE ENCOUNTER
General Question     Subject: patient is calling he has a question he would like to asked it about his office visit today regarding Foot Injury. He just need a call back.   Patient Heriberto Rene P \"Don\"   Contact Number: [phone]

## 2024-08-23 ENCOUNTER — HOSPITAL ENCOUNTER (OUTPATIENT)
Dept: PULMONOLOGY | Age: 74
Discharge: HOME | End: 2024-08-23
Attending: INTERNAL MEDICINE
Payer: MEDICARE

## 2024-08-23 DIAGNOSIS — R06.09 DOE (DYSPNEA ON EXERTION): ICD-10-CM

## 2024-08-23 PROCEDURE — 94060 EVALUATION OF WHEEZING: CPT

## 2024-08-23 PROCEDURE — 94664 DEMO&/EVAL PT USE INHALER: CPT

## 2024-08-23 PROCEDURE — 6360000002 HC RX W HCPCS: Performed by: INTERNAL MEDICINE

## 2024-08-23 PROCEDURE — 94760 N-INVAS EAR/PLS OXIMETRY 1: CPT

## 2024-08-23 PROCEDURE — 94726 PLETHYSMOGRAPHY LUNG VOLUMES: CPT

## 2024-08-23 PROCEDURE — 94729 DIFFUSING CAPACITY: CPT

## 2024-08-23 RX ORDER — ALBUTEROL SULFATE 2.5 MG/3ML
2.5 SOLUTION RESPIRATORY (INHALATION) ONCE
Status: COMPLETED | OUTPATIENT
Start: 2024-08-23 | End: 2024-08-23

## 2024-08-23 RX ADMIN — ALBUTEROL SULFATE 2.5 MG: 2.5 SOLUTION RESPIRATORY (INHALATION) at 13:42

## 2024-08-26 PROBLEM — R06.09 DOE (DYSPNEA ON EXERTION): Status: ACTIVE | Noted: 2024-08-26

## 2024-09-04 ENCOUNTER — HOSPITAL ENCOUNTER (OUTPATIENT)
Age: 74
Discharge: HOME | End: 2024-09-04
Payer: MEDICARE

## 2024-09-04 ENCOUNTER — OFFICE VISIT (OUTPATIENT)
Age: 74
End: 2024-09-04
Payer: MEDICARE

## 2024-09-04 VITALS — HEIGHT: 66 IN | BODY MASS INDEX: 29.73 KG/M2 | WEIGHT: 185 LBS

## 2024-09-04 DIAGNOSIS — S92.352A CLOSED FRACTURE OF BASE OF FIFTH METATARSAL BONE OF LEFT FOOT: ICD-10-CM

## 2024-09-04 DIAGNOSIS — S92.352A CLOSED FRACTURE OF BASE OF FIFTH METATARSAL BONE OF LEFT FOOT: Primary | ICD-10-CM

## 2024-09-04 PROCEDURE — G8417 CALC BMI ABV UP PARAM F/U: HCPCS | Performed by: ORTHOPAEDIC SURGERY

## 2024-09-04 PROCEDURE — 1123F ACP DISCUSS/DSCN MKR DOCD: CPT | Performed by: ORTHOPAEDIC SURGERY

## 2024-09-04 PROCEDURE — G8428 CUR MEDS NOT DOCUMENT: HCPCS | Performed by: ORTHOPAEDIC SURGERY

## 2024-09-04 PROCEDURE — 73630 X-RAY EXAM OF FOOT: CPT

## 2024-09-04 PROCEDURE — 99212 OFFICE O/P EST SF 10 MIN: CPT | Performed by: ORTHOPAEDIC SURGERY

## 2024-09-04 PROCEDURE — 3017F COLORECTAL CA SCREEN DOC REV: CPT | Performed by: ORTHOPAEDIC SURGERY

## 2024-09-04 PROCEDURE — 1036F TOBACCO NON-USER: CPT | Performed by: ORTHOPAEDIC SURGERY

## 2024-09-06 ENCOUNTER — OFFICE VISIT (OUTPATIENT)
Dept: INTERNAL MEDICINE CLINIC | Age: 74
End: 2024-09-06

## 2024-09-06 VITALS
OXYGEN SATURATION: 99 % | HEART RATE: 80 BPM | SYSTOLIC BLOOD PRESSURE: 102 MMHG | BODY MASS INDEX: 27.92 KG/M2 | TEMPERATURE: 97.1 F | WEIGHT: 173 LBS | DIASTOLIC BLOOD PRESSURE: 60 MMHG

## 2024-09-06 DIAGNOSIS — R73.01 IMPAIRED FASTING GLUCOSE: ICD-10-CM

## 2024-09-06 DIAGNOSIS — E78.5 DYSLIPIDEMIA: ICD-10-CM

## 2024-09-06 DIAGNOSIS — D53.9 MACROCYTIC ANEMIA: ICD-10-CM

## 2024-09-06 DIAGNOSIS — S62.91XS CLOSED FRACTURE OF RIGHT HAND, SEQUELA: ICD-10-CM

## 2024-09-06 DIAGNOSIS — I10 BENIGN ESSENTIAL HTN: ICD-10-CM

## 2024-09-06 DIAGNOSIS — S92.352A DISPLACED FRACTURE OF FIFTH METATARSAL BONE, LEFT FOOT, INITIAL ENCOUNTER FOR CLOSED FRACTURE: Primary | ICD-10-CM

## 2024-09-06 DIAGNOSIS — I95.1 ORTHOSTATIC HYPOTENSION DYSAUTONOMIC SYNDROME: ICD-10-CM

## 2024-09-06 DIAGNOSIS — R29.6 MULTIPLE FALLS: ICD-10-CM

## 2024-09-06 DIAGNOSIS — R26.89 BALANCE PROBLEM: ICD-10-CM

## 2024-09-06 DIAGNOSIS — G47.33 OSA (OBSTRUCTIVE SLEEP APNEA): ICD-10-CM

## 2024-09-06 DIAGNOSIS — I87.2 VENOUS INSUFFICIENCY OF BOTH LOWER EXTREMITIES: ICD-10-CM

## 2024-09-06 PROBLEM — S62.91XA HAND FRACTURE, RIGHT: Status: ACTIVE | Noted: 2022-12-01

## 2024-09-06 NOTE — PROGRESS NOTES
[facility]  Internal Medicine  Patient Encounter  Keith Valenzuela D.O., [facility]        Chief Complaint   Patient presents with    Check-Up     Presents for 4 mo check up.     Foot Injury     Fractured 5th Metatarsal falling up one step.       HPI: 74 y.o. male who was scheduled today for his routine checkup regarding status of current issues listed below along with a medication review and reconciliation.  Patient recently fractured his left fifth metatarsal after falling down a step.  He saw Dr. Matthews.  He is healing well. He is in a boot.  He is to remove boot in 2 weeks.      His other chronic issue has been a chronic cough.  We referred him to pulmonary for consultation.  Initially it was my contention that he had postviral reactive airways but despite aggressive therapy, his cough is continued.  Chest x-ray back on 8/17/2024 was normal.  He states the cough is much better.  PFT's showed a slightly low DLCO. He has not needed albuterol.  He denies wheezing.     He has had 6 falls in the last year and a half--12/2022.  He fell backwards going up stairs.  He fell at [facility] x 2.  All of the falls seem to be mechanical.  He has fallen in the bathroom twice.  He feels his balance is off.  He went to the balance center.  He has not had an MRI.    He struggles on stairs.  He does not drink a lot of water.   Has seen cardiology back in 2021 for Orthostatic Hypotension dysautonomia.         Hyperlipidemia--He remains on Fenofibrate 160 mg daily.   Patient tolerates the fenofibrate well without any adverse side effects.       HTN--patient remains on Toprol-XL 25 mg daily.  The beta-blocker was started many years ago targeting anxiety.  He has had elevated blood pressure and the medication works well.  He denies any headaches, lightheadedness, syncope.  He has had low BP in the past.  He has been eating less. He has lost 12#.  He has cut back on salt.    BP at home-- 119/69.  He denies lightheadedness.

## 2024-09-06 NOTE — PATIENT INSTRUCTIONS
To do list    #1 push fluids.  Drink at least 64 ounces per day.  You can drink water, Gatorade which has some electrolytes.  Caffeinated beverages such as coffee will not hydrate you.    #2 obtain MRI of the brain and see neurology for consultation regarding your balance    #3 if blood pressure continues to run low, may consider decreasing the Toprol-XL to 12.5 mg daily

## 2024-09-11 ENCOUNTER — OFFICE VISIT (OUTPATIENT)
Dept: PULMONOLOGY | Age: 74
End: 2024-09-11
Payer: MEDICARE

## 2024-09-11 VITALS
SYSTOLIC BLOOD PRESSURE: 108 MMHG | WEIGHT: 177.2 LBS | BODY MASS INDEX: 28.48 KG/M2 | OXYGEN SATURATION: 99 % | HEART RATE: 81 BPM | DIASTOLIC BLOOD PRESSURE: 64 MMHG | HEIGHT: 66 IN

## 2024-09-11 DIAGNOSIS — J45.20 MILD INTERMITTENT ASTHMA WITHOUT COMPLICATION: ICD-10-CM

## 2024-09-11 DIAGNOSIS — J30.2 SEASONAL ALLERGIC RHINITIS, UNSPECIFIED TRIGGER: ICD-10-CM

## 2024-09-11 DIAGNOSIS — R05.3 CHRONIC COUGH: Primary | ICD-10-CM

## 2024-09-11 PROCEDURE — 99204 OFFICE O/P NEW MOD 45 MIN: CPT | Performed by: INTERNAL MEDICINE

## 2024-09-11 PROCEDURE — 3074F SYST BP LT 130 MM HG: CPT | Performed by: INTERNAL MEDICINE

## 2024-09-11 PROCEDURE — G8427 DOCREV CUR MEDS BY ELIG CLIN: HCPCS | Performed by: INTERNAL MEDICINE

## 2024-09-11 PROCEDURE — 3017F COLORECTAL CA SCREEN DOC REV: CPT | Performed by: INTERNAL MEDICINE

## 2024-09-11 PROCEDURE — 1123F ACP DISCUSS/DSCN MKR DOCD: CPT | Performed by: INTERNAL MEDICINE

## 2024-09-11 PROCEDURE — 3078F DIAST BP <80 MM HG: CPT | Performed by: INTERNAL MEDICINE

## 2024-09-11 PROCEDURE — 1036F TOBACCO NON-USER: CPT | Performed by: INTERNAL MEDICINE

## 2024-09-11 PROCEDURE — G8417 CALC BMI ABV UP PARAM F/U: HCPCS | Performed by: INTERNAL MEDICINE

## 2024-09-11 RX ORDER — ALBUTEROL SULFATE 90 UG/1
2 AEROSOL, METERED RESPIRATORY (INHALATION) 4 TIMES DAILY PRN
Qty: 1 EACH | Refills: 0 | Status: SHIPPED | OUTPATIENT
Start: 2024-09-11

## 2024-09-12 ENCOUNTER — TELEPHONE (OUTPATIENT)
Dept: INTERNAL MEDICINE CLINIC | Age: 74
End: 2024-09-12

## 2024-09-12 DIAGNOSIS — I95.1 ORTHOSTATIC HYPOTENSION: ICD-10-CM

## 2024-09-12 DIAGNOSIS — I10 BENIGN ESSENTIAL HTN: ICD-10-CM

## 2024-09-12 DIAGNOSIS — D53.9 MACROCYTIC ANEMIA: ICD-10-CM

## 2024-09-12 DIAGNOSIS — R73.01 IMPAIRED FASTING GLUCOSE: ICD-10-CM

## 2024-09-12 DIAGNOSIS — I10 BENIGN HYPERTENSION: ICD-10-CM

## 2024-09-12 DIAGNOSIS — R26.89 BALANCE PROBLEM: Primary | ICD-10-CM

## 2024-09-13 DIAGNOSIS — I95.1 ORTHOSTATIC HYPOTENSION: ICD-10-CM

## 2024-09-13 DIAGNOSIS — R26.89 BALANCE PROBLEM: ICD-10-CM

## 2024-09-13 DIAGNOSIS — R73.01 IMPAIRED FASTING GLUCOSE: ICD-10-CM

## 2024-09-13 DIAGNOSIS — D53.9 MACROCYTIC ANEMIA: ICD-10-CM

## 2024-09-13 DIAGNOSIS — I10 BENIGN ESSENTIAL HTN: ICD-10-CM

## 2024-09-13 LAB
ALBUMIN SERPL-MCNC: 4.3 G/DL (ref 3.4–5)
ALBUMIN/GLOB SERPL: 2.2 {RATIO} (ref 1.1–2.2)
ALP SERPL-CCNC: 64 U/L (ref 40–129)
ALT SERPL-CCNC: 17 U/L (ref 10–40)
ANION GAP SERPL CALCULATED.3IONS-SCNC: 10 MMOL/L (ref 3–16)
AST SERPL-CCNC: 25 U/L (ref 15–37)
BASOPHILS # BLD: 0.1 K/UL (ref 0–0.2)
BASOPHILS NFR BLD: 0.9 %
BILIRUB SERPL-MCNC: 0.9 MG/DL (ref 0–1)
BUN SERPL-MCNC: 15 MG/DL (ref 7–20)
CALCIUM SERPL-MCNC: 9.5 MG/DL (ref 8.3–10.6)
CHLORIDE SERPL-SCNC: 100 MMOL/L (ref 99–110)
CHOLEST SERPL-MCNC: 130 MG/DL (ref 0–199)
CO2 SERPL-SCNC: 26 MMOL/L (ref 21–32)
CREAT SERPL-MCNC: 1 MG/DL (ref 0.8–1.3)
DEPRECATED RDW RBC AUTO: 14.3 % (ref 12.4–15.4)
EOSINOPHIL # BLD: 0.5 K/UL (ref 0–0.6)
EOSINOPHIL NFR BLD: 5.3 %
EST. AVERAGE GLUCOSE BLD GHB EST-MCNC: 79.6 MG/DL
FOLATE SERPL-MCNC: 10.7 NG/ML (ref 4.78–24.2)
GFR SERPLBLD CREATININE-BSD FMLA CKD-EPI: 78 ML/MIN/{1.73_M2}
GLUCOSE SERPL-MCNC: 81 MG/DL (ref 70–99)
HBA1C MFR BLD: 4.4 %
HCT VFR BLD AUTO: 32.1 % (ref 40.5–52.5)
HDLC SERPL-MCNC: 54 MG/DL (ref 40–60)
HGB BLD-MCNC: 11.3 G/DL (ref 13.5–17.5)
LDLC SERPL CALC-MCNC: 61 MG/DL
LYMPHOCYTES # BLD: 0.9 K/UL (ref 1–5.1)
LYMPHOCYTES NFR BLD: 9.4 %
MCH RBC QN AUTO: 35.9 PG (ref 26–34)
MCHC RBC AUTO-ENTMCNC: 35.1 G/DL (ref 31–36)
MCV RBC AUTO: 102.5 FL (ref 80–100)
MONOCYTES # BLD: 0.5 K/UL (ref 0–1.3)
MONOCYTES NFR BLD: 5.3 %
NEUTROPHILS # BLD: 7.3 K/UL (ref 1.7–7.7)
NEUTROPHILS NFR BLD: 79.1 %
PLATELET # BLD AUTO: 305 K/UL (ref 135–450)
PMV BLD AUTO: 9.7 FL (ref 5–10.5)
POTASSIUM SERPL-SCNC: 4.7 MMOL/L (ref 3.5–5.1)
PROT SERPL-MCNC: 6.3 G/DL (ref 6.4–8.2)
RBC # BLD AUTO: 3.14 M/UL (ref 4.2–5.9)
SODIUM SERPL-SCNC: 136 MMOL/L (ref 136–145)
TRIGL SERPL-MCNC: 75 MG/DL (ref 0–150)
TSH SERPL DL<=0.005 MIU/L-ACNC: 3.37 UIU/ML (ref 0.27–4.2)
VIT B12 SERPL-MCNC: 512 PG/ML (ref 211–911)
VLDLC SERPL CALC-MCNC: 15 MG/DL
WBC # BLD AUTO: 9.2 K/UL (ref 4–11)

## 2024-09-15 DIAGNOSIS — D53.9 MACROCYTIC ANEMIA: Primary | ICD-10-CM

## 2024-09-24 NOTE — PROGRESS NOTES
Heriberto Rene  [id number]  Encounter Date: 9/25/2024  YOB: 1950    Chief Complaint   Patient presents with    Follow-up     L foot        History:Mr. Heriberto Rene is here in follow up regarding his left 5th metatarsal base fracture.  He started weaning out of his boot 9/18.  Denies any pain currently.  No new injuries.    Exam:  Ht 1.676 m (5' 5.98\")   Wt 80.3 kg (177 lb)   BMI 28.59 kg/m²   General: Alert and oriented x3, No acute distress, Cooperative and conversant.  Mood and affect are appropriate.  Left foot: No tenderness to palpation over the fifth metatarsal base.  No significant swelling.  Ankle range of motion on dorsiflexion, plantarflexion, inversion and eversion are within expected limits.  Good strength on resisted dorsiflexion and eversion without pain.  He can stand with equal weight on both feet and also perform a standing bilateral heel raise without pain.  Sensation to light touch grossly present throughout the left lower extremity  Capillary refill < 2 seconds and palpable distal pulses  Skin: no erythema, lesions or rashes  No signs / symptoms of DVT / PE or infection    X-rays: Standing x-rays AP, oblique and lateral views left foot  Continued healing without further displacement of his fifth metatarsal base fracture.  The dorsal and medial aspect shows good bridging bone.  I reviewed the images with him and explained to him that it will take several months for full mineralization to consolidate at the area of soft callus.    Assessment:   Diagnosis Orders   1. Closed fracture of base of fifth metatarsal bone of left foot  XR FOOT LEFT (MIN 3 VIEWS)          Orders  Orders Placed This Encounter   Procedures    XR FOOT LEFT (MIN 3 VIEWS)       Plan:  We discussed treatment options today.  At this point he is cleared to resume activities as tolerated in a regular shoe.  He can return to exercise walking although I recommended that he begin at half his usual volume at up to a

## 2024-09-25 ENCOUNTER — OFFICE VISIT (OUTPATIENT)
Age: 74
End: 2024-09-25

## 2024-09-25 VITALS — BODY MASS INDEX: 28.45 KG/M2 | WEIGHT: 177 LBS | HEIGHT: 66 IN

## 2024-09-25 DIAGNOSIS — S92.352A CLOSED FRACTURE OF BASE OF FIFTH METATARSAL BONE OF LEFT FOOT: Primary | ICD-10-CM

## 2024-09-27 ENCOUNTER — PATIENT MESSAGE (OUTPATIENT)
Dept: INTERNAL MEDICINE CLINIC | Age: 74
End: 2024-09-27

## 2024-10-16 DIAGNOSIS — R00.0 RACING HEART BEAT: ICD-10-CM

## 2024-10-16 RX ORDER — METOPROLOL SUCCINATE 25 MG/1
25 TABLET, EXTENDED RELEASE ORAL DAILY
Qty: 90 TABLET | Refills: 3 | Status: SHIPPED | OUTPATIENT
Start: 2024-10-16

## 2024-10-16 NOTE — TELEPHONE ENCOUNTER
Medication:   Requested Prescriptions     Pending Prescriptions Disp Refills    metoprolol succinate (TOPROL XL) 25 MG extended release tablet [Pharmacy Med Name: METOPROLOL ER SUCCINATE 25MG TABS] 90 tablet 3     Sig: TAKE 1 TABLET BY MOUTH DAILY     Last Filled:  10/16/23    Last appt: 9/6/2024   Next appt: 3/10/2025    Last OARRS:        No data to display

## 2024-10-31 ENCOUNTER — HOSPITAL ENCOUNTER (OUTPATIENT)
Age: 74
Discharge: HOME | End: 2024-10-31
Payer: MEDICARE

## 2024-10-31 DIAGNOSIS — R29.6 MULTIPLE FALLS: ICD-10-CM

## 2024-10-31 DIAGNOSIS — R26.89 BALANCE PROBLEM: ICD-10-CM

## 2024-10-31 LAB
EGFR, POC: 78 ML/MIN/1.73M2
POC CREATININE: 1 MG/DL (ref 0.8–1.3)

## 2024-10-31 PROCEDURE — 6360000004 HC RX CONTRAST MEDICATION: Performed by: INTERNAL MEDICINE

## 2024-10-31 PROCEDURE — A9579 GAD-BASE MR CONTRAST NOS,1ML: HCPCS | Performed by: INTERNAL MEDICINE

## 2024-10-31 PROCEDURE — 82565 ASSAY OF CREATININE: CPT

## 2024-10-31 PROCEDURE — 70553 MRI BRAIN STEM W/O & W/DYE: CPT

## 2024-10-31 RX ADMIN — GADOTERIDOL 16 ML: 279.3 INJECTION, SOLUTION INTRAVENOUS at 08:40

## 2024-11-04 ENCOUNTER — HOSPITAL ENCOUNTER (OUTPATIENT)
Dept: CT IMAGING | Age: 74
Discharge: HOME | End: 2024-11-04
Attending: STUDENT IN AN ORGANIZED HEALTH CARE EDUCATION/TRAINING PROGRAM
Payer: MEDICARE

## 2024-11-04 VITALS
HEART RATE: 61 BPM | RESPIRATION RATE: 18 BRPM | SYSTOLIC BLOOD PRESSURE: 126 MMHG | HEIGHT: 67 IN | WEIGHT: 174 LBS | TEMPERATURE: 97.2 F | OXYGEN SATURATION: 96 % | BODY MASS INDEX: 27.31 KG/M2 | DIASTOLIC BLOOD PRESSURE: 73 MMHG

## 2024-11-04 DIAGNOSIS — Z86.2 HISTORY OF ANEMIA: ICD-10-CM

## 2024-11-04 LAB
APTT BLD: 23.1 SEC (ref 22.1–36.4)
BASOPHILS # BLD: 0.1 K/UL (ref 0–0.2)
BASOPHILS NFR BLD: 1.8 %
DEPRECATED RDW RBC AUTO: 13.9 % (ref 12.4–15.4)
EOSINOPHIL # BLD: 0.7 K/UL (ref 0–0.6)
EOSINOPHIL NFR BLD: 11.2 %
HCT VFR BLD AUTO: 34.8 % (ref 40.5–52.5)
HGB BLD-MCNC: 11.9 G/DL (ref 13.5–17.5)
INR PPP: 0.98 (ref 0.85–1.15)
LYMPHOCYTES # BLD: 1.5 K/UL (ref 1–5.1)
LYMPHOCYTES NFR BLD: 22.7 %
MCH RBC QN AUTO: 34.4 PG (ref 26–34)
MCHC RBC AUTO-ENTMCNC: 34.2 G/DL (ref 31–36)
MCV RBC AUTO: 100.4 FL (ref 80–100)
MONOCYTES # BLD: 0.3 K/UL (ref 0–1.3)
MONOCYTES NFR BLD: 4.3 %
NEUTROPHILS # BLD: 3.9 K/UL (ref 1.7–7.7)
NEUTROPHILS NFR BLD: 60 %
PLATELET # BLD AUTO: 329 K/UL (ref 135–450)
PMV BLD AUTO: 7.5 FL (ref 5–10.5)
PROTHROMBIN TIME: 13.2 SEC (ref 11.9–14.9)
RBC # BLD AUTO: 3.47 M/UL (ref 4.2–5.9)
WBC # BLD AUTO: 6.5 K/UL (ref 4–11)

## 2024-11-04 PROCEDURE — 85730 THROMBOPLASTIN TIME PARTIAL: CPT

## 2024-11-04 PROCEDURE — 88184 FLOWCYTOMETRY/ TC 1 MARKER: CPT

## 2024-11-04 PROCEDURE — 85025 COMPLETE CBC W/AUTO DIFF WBC: CPT

## 2024-11-04 PROCEDURE — 88185 FLOWCYTOMETRY/TC ADD-ON: CPT

## 2024-11-04 PROCEDURE — 88313 SPECIAL STAINS GROUP 2: CPT

## 2024-11-04 PROCEDURE — 85610 PROTHROMBIN TIME: CPT

## 2024-11-04 PROCEDURE — 38222 DX BONE MARROW BX & ASPIR: CPT

## 2024-11-04 PROCEDURE — 36415 COLL VENOUS BLD VENIPUNCTURE: CPT

## 2024-11-04 PROCEDURE — 88305 TISSUE EXAM BY PATHOLOGIST: CPT

## 2024-11-04 PROCEDURE — 88342 IMHCHEM/IMCYTCHM 1ST ANTB: CPT

## 2024-11-04 PROCEDURE — 88341 IMHCHEM/IMCYTCHM EA ADD ANTB: CPT

## 2024-11-04 PROCEDURE — 88311 DECALCIFY TISSUE: CPT

## 2024-11-04 PROCEDURE — 2500000003 HC RX 250 WO HCPCS: Performed by: RADIOLOGY

## 2024-11-04 PROCEDURE — 6360000002 HC RX W HCPCS: Performed by: RADIOLOGY

## 2024-11-04 RX ORDER — BUPIVACAINE HYDROCHLORIDE 5 MG/ML
INJECTION, SOLUTION EPIDURAL; INTRACAUDAL PRN
Status: COMPLETED | OUTPATIENT
Start: 2024-11-04 | End: 2024-11-04

## 2024-11-04 RX ORDER — LIDOCAINE HYDROCHLORIDE 10 MG/ML
INJECTION, SOLUTION EPIDURAL; INFILTRATION; INTRACAUDAL; PERINEURAL PRN
Status: COMPLETED | OUTPATIENT
Start: 2024-11-04 | End: 2024-11-04

## 2024-11-04 RX ORDER — FENTANYL CITRATE 50 UG/ML
INJECTION, SOLUTION INTRAMUSCULAR; INTRAVENOUS PRN
Status: COMPLETED | OUTPATIENT
Start: 2024-11-04 | End: 2024-11-04

## 2024-11-04 RX ORDER — MIDAZOLAM HYDROCHLORIDE 1 MG/ML
INJECTION, SOLUTION INTRAMUSCULAR; INTRAVENOUS PRN
Status: COMPLETED | OUTPATIENT
Start: 2024-11-04 | End: 2024-11-04

## 2024-11-04 RX ADMIN — MIDAZOLAM HYDROCHLORIDE 1 MG: 2 INJECTION, SOLUTION INTRAMUSCULAR; INTRAVENOUS at 11:59

## 2024-11-04 RX ADMIN — FENTANYL CITRATE 50 MCG: 50 INJECTION, SOLUTION INTRAMUSCULAR; INTRAVENOUS at 11:58

## 2024-11-04 RX ADMIN — LIDOCAINE HYDROCHLORIDE 15 ML: 10 INJECTION, SOLUTION EPIDURAL; INFILTRATION; INTRACAUDAL; PERINEURAL at 12:03

## 2024-11-04 RX ADMIN — BUPIVACAINE HYDROCHLORIDE 15 ML: 5 INJECTION, SOLUTION EPIDURAL; INTRACAUDAL at 12:05

## 2024-11-04 ASSESSMENT — PAIN - FUNCTIONAL ASSESSMENT: PAIN_FUNCTIONAL_ASSESSMENT: NONE - DENIES PAIN

## 2024-11-04 NOTE — BRIEF OP NOTE
Brief Postoperative Note    Heriberto Rene  YOB: 1950  [id number]    Pre-operative Diagnosis: Anemia in need of bone marrow biopsy.    Post-operative Diagnosis: Same    Procedure: CT guided bone marrow biopsy.    Anesthesia: moderate    Surgeons/Assistants:  Tres Jones    Estimated Blood Loss: Minimal    Complications: none    Specimens: were obtained      Tres Jones MD MD  11/4/2024

## 2024-11-04 NOTE — PROGRESS NOTES
Ambulatory Surgery/Procedure Discharge Note    Vitals:    11/04/24 1241   BP: 134/69   Pulse: 57   Resp: 16   Temp:    SpO2: 98%   Phase 2 s/p bone marrow bx   lower rt iliac crest posterior cdi soft no pain    In: 120 [P.O.:120]  Out: -     Restroom use offered before discharge.  Yes    Pain assessment:  none     1248 reviewed d/c instructions with pt and his wife  post bx, both verbalized their understanding, see flow sheet and assessment sheet, vss, site with no change    1310 d/c iv , up at bedside dressing wife to get car.    1315  Patient discharged to home/self care. Patient discharged via wheel chair by transporter to waiting family/S.O.       11/4/2024

## 2024-11-04 NOTE — PROGRESS NOTES
IMAGING SERVICES NURSING PROGRESS NOTE    Procedure:  BMBX  November 4, 2024  Heriberto Rene      Allergies:  No Known Allergies    Vitals:    11/04/24 1030   BP: 118/69   Pulse: 70   Resp: 18   Temp: 98.2 °F (36.8 °C)   SpO2: 100%       Recent lab work reviewed with MD: yes    Procedure explained to patient by MD: yes   Informed consent obtained:yes  Family with patient:yes    Mental Status:  Normal  Readiness to learn:  Yes  Barriers to learning: No    Pain Assessment Pre-Procedure:  Pain Present:  no  Pain Score:  0  Pain Quality/Description:  na    Time out Procedure Verification with:  [x] RN  [x] Physician  [x] Patient  [x] Other: CT Technologist  Procedure site marked, if applicable:  Yes    Note: Patient arrived A & O x 4, denies pain, breathing easily on room air, Spoke to Dr. Jones prior to procedure.  Procedural sedation:  Fentanyl:  50mcg  Versed:    1mg  Post Procedureal Note:  Patient tolerated procedure well.  Breathing easily on room air.  [facility]  Patient transported in stable conditon to room 5.    Pain Assessment Post-Procedure:  Pain Present:  no  Pain Score:  0  Pain Quality/Description:  na    Plan of Care Goals:  Safety measures met:  Yes  Patient understands explanation of procedure:  Yes    Time in: 1135   Time out:  1215  Vanessa Villa RN.  R.N. 11/4/2024

## 2024-11-04 NOTE — PRE SEDATION
IR  H & P      Patient:  Heriberto Rene   :   1950      Relevant patient history reviewed and discussed.    CC: Anemia in need of bone marrow biopsy.    The procedure including risks and benefits was discussed at length with the patient (or designated family member) and all questions were answered.  Informed consent to proceed with the procedure was given.      Heart : regular rate and rhythm  Lungs : clear, breathing easily  Airway Assessment: Mallampati 2  Condition : stable    Gigi Scale:  Activity:  2 - Able to move 4 extremities voluntarily on command  Respiration:  2 - Able to breathe deeply and cough freely  Circulation:  2 - BP+/- 20mmHg of normal  Consciousness:  2 - Fully awake  Oxygen Saturation (color):  2 - Able to maintain oxygen saturation >92% on room air      [facility] reviewed and agreed.  Medications reviewed.  Current Outpatient Medications on File Prior to Encounter   Medication Sig Dispense Refill    metoprolol succinate (TOPROL XL) 25 MG extended release tablet TAKE 1 TABLET BY MOUTH DAILY 90 tablet 3    albuterol sulfate HFA (VENTOLIN HFA) 108 (90 Base) MCG/ACT inhaler Inhale 2 puffs into the lungs 4 times daily as needed for Wheezing 1 each 0    fluticasone (FLONASE) 50 MCG/ACT nasal spray 2 sprays by Each Nostril route daily 16 g 5    clomiPRAMINE (ANAFRANIL) 75 MG capsule TAKE 2 CAPSULES BY MOUTH EVERY EVENING 180 capsule 3    fenofibrate (TRIGLIDE) 160 MG tablet TAKE 1 TABLET BY MOUTH EVERY DAY 90 tablet 3    CINNAMON PO Take 2 tablets by mouth daily       No current facility-administered medications on file prior to encounter.     Allergies: No Known Allergies  Sedation : Moderate sedation planned  ASA 1 - Normal health patient

## 2024-11-04 NOTE — DISCHARGE INSTRUCTIONS
help you recover.     Most people are able to return to their usual activities the day after the procedure.   Medicines    Your doctor will tell you if and when you can restart your medicines. You will be given instructions about taking any new medicines.     If you stopped taking aspirin or some other blood thinner, your doctor will tell you when to start taking it again.     Be safe with medicines. Read and follow all instructions on the label.  If you are not taking a prescription pain medicine, ask your doctor if you can take an over-the-counter medicine such as acetaminophen (Tylenol). Read and follow all instructions on the label.  If the doctor gave you a prescription medicine for pain, take it as prescribed.  Store your prescription pain medicines where no one else can get to them. When you are done using them, dispose of them quickly and safely. Your local pharmacy or hospital may have a drop-off site.  Do not take two or more pain medicines at the same time unless the doctor told you to. Many pain medicines have acetaminophen, which is Tylenol. Too much acetaminophen (Tylenol) can be harmful.   Ice    Put ice or a cold pack on the biopsy site for 10 to 20 minutes at a time. Put a thin cloth between the ice and your skin.   Follow-up care is a key part of your treatment and safety. Be sure to make and go to all appointments, and call your doctor if you are having problems. It's also a good idea to know your test results and keep a list of the medicines you take.  When should you call for help?   Call 911 anytime you think you may need emergency care. For example, call if:    You passed out (lost consciousness).   Call your doctor now or seek immediate medical care if:    You have signs of infection, such as:  Increased pain, swelling, warmth, or redness.  Red streaks leading from the biopsy site.  Pus draining from the biopsy site.  Swollen lymph nodes in your neck, armpits, or groin.  A fever.   Watch

## 2024-11-14 LAB
Lab: NORMAL
REPORT: NORMAL
THIS TEST SENT TO: NORMAL

## 2024-11-20 ENCOUNTER — OFFICE VISIT (OUTPATIENT)
Dept: PULMONOLOGY | Age: 74
End: 2024-11-20
Payer: MEDICARE

## 2024-11-20 VITALS
WEIGHT: 178 LBS | BODY MASS INDEX: 27.94 KG/M2 | SYSTOLIC BLOOD PRESSURE: 110 MMHG | HEART RATE: 74 BPM | RESPIRATION RATE: 16 BRPM | HEIGHT: 67 IN | DIASTOLIC BLOOD PRESSURE: 64 MMHG | OXYGEN SATURATION: 95 %

## 2024-11-20 DIAGNOSIS — J45.20 MILD INTERMITTENT ASTHMA WITHOUT COMPLICATION: ICD-10-CM

## 2024-11-20 DIAGNOSIS — J30.2 SEASONAL ALLERGIC RHINITIS, UNSPECIFIED TRIGGER: ICD-10-CM

## 2024-11-20 DIAGNOSIS — R05.3 CHRONIC COUGH: Primary | ICD-10-CM

## 2024-11-20 PROCEDURE — 3074F SYST BP LT 130 MM HG: CPT | Performed by: INTERNAL MEDICINE

## 2024-11-20 PROCEDURE — 1036F TOBACCO NON-USER: CPT | Performed by: INTERNAL MEDICINE

## 2024-11-20 PROCEDURE — 3078F DIAST BP <80 MM HG: CPT | Performed by: INTERNAL MEDICINE

## 2024-11-20 PROCEDURE — G8427 DOCREV CUR MEDS BY ELIG CLIN: HCPCS | Performed by: INTERNAL MEDICINE

## 2024-11-20 PROCEDURE — 3017F COLORECTAL CA SCREEN DOC REV: CPT | Performed by: INTERNAL MEDICINE

## 2024-11-20 PROCEDURE — 1123F ACP DISCUSS/DSCN MKR DOCD: CPT | Performed by: INTERNAL MEDICINE

## 2024-11-20 PROCEDURE — G8484 FLU IMMUNIZE NO ADMIN: HCPCS | Performed by: INTERNAL MEDICINE

## 2024-11-20 PROCEDURE — 1159F MED LIST DOCD IN RCRD: CPT | Performed by: INTERNAL MEDICINE

## 2024-11-20 PROCEDURE — G8417 CALC BMI ABV UP PARAM F/U: HCPCS | Performed by: INTERNAL MEDICINE

## 2024-11-20 PROCEDURE — 99214 OFFICE O/P EST MOD 30 MIN: CPT | Performed by: INTERNAL MEDICINE

## 2024-11-20 RX ORDER — MONTELUKAST SODIUM 10 MG/1
10 TABLET ORAL DAILY
Qty: 30 TABLET | Refills: 3 | Status: SHIPPED | OUTPATIENT
Start: 2024-11-20 | End: 2024-11-21

## 2024-11-20 RX ORDER — ASPIRIN 81 MG/1
81 TABLET ORAL DAILY
COMMUNITY

## 2024-11-20 NOTE — ASSESSMENT & PLAN NOTE
Still having issues with constant throat clearing, this was brought up by his wife on today's visit.   -Continue Flonase BID with Astelin BID as well  -Continue nasal rinses  -Recommended Allergy/Immunology evaluation but declined at the moment.

## 2024-11-20 NOTE — ASSESSMENT & PLAN NOTE
Has used YUNIEL in the past, PFT obstructive with history of seasonal allergies and sinusitis in the past, also has Eosinophilia, suggest asthmatic picture.   -YUNIEL PRN for now

## 2024-11-20 NOTE — PROGRESS NOTES
[facility] [facility]    OUTPATIENT INITIAL NOTE    SUBJECTIVE:   Referring Physician: Bianca GRANADOS    CHIEF COMPLAINT / HPI:    Heriberto is a 74 y.o. male that initially presented to clinic for evaluation of chronic cough.  Has been dealing with this for about 3 months  GERD is not a problem.   Relevant Social History  Tobacco: Never   Substances: EtOH in the past  Occupational: Retired pediatrician  Pets: None  Family history of pulmonary problems: None that he is aware of.     INTERVAL HISTORY:  11/20 - Cough is better, still present but much improved.      Past Medical History:    Past Medical History:   Diagnosis Date    Acquired spondylolisthesis of lumbosacral region 02/08/2019    Alcoholism (HCC)     Anxiety     Basal cell cancer 08/2010    Dr. Bragg    Cataracts, bilateral 08/2023    Chronic bilateral low back pain without sciatica 02/08/2019    Closed displaced fracture of first metacarpal bone of right hand 12/20/2022    Residual extensor tendon rupture.    DDD (degenerative disc disease), lumbar 02/08/2019    Depression     DVT, lower extremity (HCC) 05/29/2015    Right, gastroc, soleal    GERD (gastroesophageal reflux disease)     Hand fracture, right 12/2022    Hyperlipidemia     Hypertension     Impaired fasting glucose 09/06/2023    Lumbar facet arthropathy 02/15/2019    Lumbar foraminal stenosis 02/15/2019    Medial meniscus tear 09/2023    HOMER (obstructive sleep apnea) 04/12/2023    Osteopenia     Osteoporosis Ongoing    Seasonal allergic rhinitis 09/11/2024    Sleep apnea     Squamous cell skin cancer 2017    right ear lobe    Stasis dermatitis     Varicose veins of both lower extremities     Venous insufficiency of both lower extremities      Social History:    Social History     Tobacco Use   Smoking Status Never    Passive exposure: Past (CHILDHOOD HOME)   Smokeless Tobacco Never       Family History:  Family History   Problem Relation Age of Onset    High

## 2024-11-20 NOTE — ASSESSMENT & PLAN NOTE
Dealing with this for about 3 months, had COVID about 2 months ago. Has been slowly getting better. Received URI treatment, no substantial response to YUNIEL.  Has received 2 courses of abx, last one about 2 weeks prior to initial visit. Minimal yellow/green sputum.   No significant atopy reported, no seasonal problems but he does take Zyrtec. Has tried Flonase 2 and Astelin 1 spray each daily. Has been evaluated by ENT for sinusitis requiring surgery.  -Increased Astelin to BID  -Recommended to space Flonase to 1 spray BID to minimize volume per dose  -Nonpharmacological GERD management strategies provided.  -Albuterol PRN

## 2024-11-21 RX ORDER — MONTELUKAST SODIUM 10 MG/1
10 TABLET ORAL DAILY
Qty: 90 TABLET | Refills: 3 | Status: SHIPPED | OUTPATIENT
Start: 2024-11-21

## 2024-12-05 ENCOUNTER — HOSPITAL ENCOUNTER (OUTPATIENT)
Age: 74
Discharge: HOME | End: 2024-12-07
Payer: MEDICARE

## 2024-12-05 DIAGNOSIS — I49.9 VENTRICULAR ARRHYTHMIA: ICD-10-CM

## 2024-12-05 DIAGNOSIS — Z86.73 PERSONAL HISTORY OF TRANSIENT CEREBRAL ISCHEMIA: ICD-10-CM

## 2024-12-05 DIAGNOSIS — R26.89 SCISSOR GAIT: ICD-10-CM

## 2024-12-05 DIAGNOSIS — I63.9 IMPENDING CEREBROVASCULAR ACCIDENT (HCC): ICD-10-CM

## 2024-12-05 LAB
VAS LEFT ARM BP: 130 MMHG
VAS LEFT CCA DIST EDV: 8.4 CM/S
VAS LEFT CCA DIST PSV: 69 CM/S
VAS LEFT CCA MID EDV: 15.6 CM/S
VAS LEFT CCA MID PSV: 119 CM/S
VAS LEFT CCA PROX EDV: 19.2 CM/S
VAS LEFT CCA PROX PSV: 124 CM/S
VAS LEFT ECA EDV: 7.8 CM/S
VAS LEFT ECA PSV: 92.4 CM/S
VAS LEFT ICA DIST EDV: 20.4 CM/S
VAS LEFT ICA DIST PSV: 66.6 CM/S
VAS LEFT ICA MID EDV: 16.2 CM/S
VAS LEFT ICA MID PSV: 58.8 CM/S
VAS LEFT ICA PROX EDV: 19.2 CM/S
VAS LEFT ICA PROX PSV: 61.8 CM/S
VAS LEFT SUBCLAVIAN PROX EDV: 0 CM/S
VAS LEFT SUBCLAVIAN PROX PSV: 165 CM/S
VAS LEFT VERTEBRAL EDV: 8.4 CM/S
VAS LEFT VERTEBRAL PSV: 51.6 CM/S
VAS RIGHT ARM BP: 130 MMHG
VAS RIGHT CCA DIST EDV: 34 CM/S
VAS RIGHT CCA DIST PSV: 66.6 CM/S
VAS RIGHT CCA MID EDV: 15.6 CM/S
VAS RIGHT CCA MID PSV: 91.1 CM/S
VAS RIGHT CCA PROX EDV: 19 CM/S
VAS RIGHT CCA PROX PSV: 99.9 CM/S
VAS RIGHT ECA EDV: 0 CM/S
VAS RIGHT ECA PSV: 89.7 CM/S
VAS RIGHT ICA DIST EDV: 28.5 CM/S
VAS RIGHT ICA DIST PSV: 106 CM/S
VAS RIGHT ICA MID EDV: 21.7 CM/S
VAS RIGHT ICA MID PSV: 90.4 CM/S
VAS RIGHT ICA PROX EDV: 23.1 CM/S
VAS RIGHT ICA PROX PSV: 84.3 CM/S
VAS RIGHT SUBCLAVIAN PROX EDV: 12 CM/S
VAS RIGHT SUBCLAVIAN PROX PSV: 173 CM/S
VAS RIGHT VERTEBRAL EDV: 21.7 CM/S
VAS RIGHT VERTEBRAL PSV: 61.2 CM/S

## 2024-12-05 PROCEDURE — 93880 EXTRACRANIAL BILAT STUDY: CPT

## 2024-12-05 PROCEDURE — 93880 EXTRACRANIAL BILAT STUDY: CPT | Performed by: INTERNAL MEDICINE

## 2025-03-10 DIAGNOSIS — J45.20 MILD INTERMITTENT ASTHMA WITHOUT COMPLICATION: Primary | ICD-10-CM

## 2025-03-11 RX ORDER — ALBUTEROL SULFATE 90 UG/1
INHALANT RESPIRATORY (INHALATION)
Qty: 8.5 G | Refills: 5 | Status: SHIPPED | OUTPATIENT
Start: 2025-03-11

## 2025-03-14 ENCOUNTER — OFFICE VISIT (OUTPATIENT)
Dept: INTERNAL MEDICINE CLINIC | Age: 75
End: 2025-03-14

## 2025-03-14 VITALS
WEIGHT: 178 LBS | TEMPERATURE: 97.2 F | DIASTOLIC BLOOD PRESSURE: 76 MMHG | OXYGEN SATURATION: 97 % | HEIGHT: 67 IN | SYSTOLIC BLOOD PRESSURE: 108 MMHG | HEART RATE: 65 BPM | BODY MASS INDEX: 27.94 KG/M2

## 2025-03-14 DIAGNOSIS — R26.89 BALANCE PROBLEM: ICD-10-CM

## 2025-03-14 DIAGNOSIS — R29.6 MULTIPLE FALLS: ICD-10-CM

## 2025-03-14 DIAGNOSIS — D53.9 MACROCYTIC ANEMIA: ICD-10-CM

## 2025-03-14 DIAGNOSIS — G47.33 OSA (OBSTRUCTIVE SLEEP APNEA): ICD-10-CM

## 2025-03-14 DIAGNOSIS — I87.2 VENOUS INSUFFICIENCY OF BOTH LOWER EXTREMITIES: ICD-10-CM

## 2025-03-14 DIAGNOSIS — Z12.5 SCREENING FOR PROSTATE CANCER: ICD-10-CM

## 2025-03-14 DIAGNOSIS — Z00.00 MEDICARE ANNUAL WELLNESS VISIT, SUBSEQUENT: Primary | ICD-10-CM

## 2025-03-14 DIAGNOSIS — Z86.73 HISTORY OF CEREBELLAR STROKE: ICD-10-CM

## 2025-03-14 DIAGNOSIS — I10 BENIGN ESSENTIAL HTN: ICD-10-CM

## 2025-03-14 DIAGNOSIS — E78.5 DYSLIPIDEMIA: ICD-10-CM

## 2025-03-14 DIAGNOSIS — I69.30 LATE EFFECT OF CEREBROVASCULAR ACCIDENT (CVA): ICD-10-CM

## 2025-03-14 DIAGNOSIS — R73.01 IMPAIRED FASTING GLUCOSE: ICD-10-CM

## 2025-03-14 DIAGNOSIS — I47.29 NSVT (NONSUSTAINED VENTRICULAR TACHYCARDIA) (HCC): ICD-10-CM

## 2025-03-14 DIAGNOSIS — F19.11 HISTORY OF SUBSTANCE ABUSE (HCC): ICD-10-CM

## 2025-03-14 DIAGNOSIS — I95.1 ORTHOSTATIC HYPOTENSION DYSAUTONOMIC SYNDROME: ICD-10-CM

## 2025-03-14 RX ORDER — CETIRIZINE HYDROCHLORIDE 10 MG/1
10 TABLET ORAL DAILY
COMMUNITY

## 2025-03-14 RX ORDER — SIMVASTATIN 10 MG
10 TABLET ORAL NIGHTLY
COMMUNITY

## 2025-03-14 SDOH — ECONOMIC STABILITY: FOOD INSECURITY: WITHIN THE PAST 12 MONTHS, YOU WORRIED THAT YOUR FOOD WOULD RUN OUT BEFORE YOU GOT MONEY TO BUY MORE.: NEVER TRUE

## 2025-03-14 SDOH — ECONOMIC STABILITY: FOOD INSECURITY: WITHIN THE PAST 12 MONTHS, THE FOOD YOU BOUGHT JUST DIDN'T LAST AND YOU DIDN'T HAVE MONEY TO GET MORE.: NEVER TRUE

## 2025-03-14 ASSESSMENT — PATIENT HEALTH QUESTIONNAIRE - PHQ9
3. TROUBLE FALLING OR STAYING ASLEEP: NOT AT ALL
4. FEELING TIRED OR HAVING LITTLE ENERGY: NOT AT ALL
9. THOUGHTS THAT YOU WOULD BE BETTER OFF DEAD, OR OF HURTING YOURSELF: NOT AT ALL
6. FEELING BAD ABOUT YOURSELF - OR THAT YOU ARE A FAILURE OR HAVE LET YOURSELF OR YOUR FAMILY DOWN: NOT AT ALL
SUM OF ALL RESPONSES TO PHQ QUESTIONS 1-9: 0
10. IF YOU CHECKED OFF ANY PROBLEMS, HOW DIFFICULT HAVE THESE PROBLEMS MADE IT FOR YOU TO DO YOUR WORK, TAKE CARE OF THINGS AT HOME, OR GET ALONG WITH OTHER PEOPLE: NOT DIFFICULT AT ALL
2. FEELING DOWN, DEPRESSED OR HOPELESS: NOT AT ALL
1. LITTLE INTEREST OR PLEASURE IN DOING THINGS: NOT AT ALL
8. MOVING OR SPEAKING SO SLOWLY THAT OTHER PEOPLE COULD HAVE NOTICED. OR THE OPPOSITE, BEING SO FIGETY OR RESTLESS THAT YOU HAVE BEEN MOVING AROUND A LOT MORE THAN USUAL: NOT AT ALL
7. TROUBLE CONCENTRATING ON THINGS, SUCH AS READING THE NEWSPAPER OR WATCHING TELEVISION: NOT AT ALL
SUM OF ALL RESPONSES TO PHQ QUESTIONS 1-9: 0
SUM OF ALL RESPONSES TO PHQ QUESTIONS 1-9: 0
1. LITTLE INTEREST OR PLEASURE IN DOING THINGS: NOT AT ALL
2. FEELING DOWN, DEPRESSED OR HOPELESS: NOT AT ALL
SUM OF ALL RESPONSES TO PHQ QUESTIONS 1-9: 0
5. POOR APPETITE OR OVEREATING: NOT AT ALL
SUM OF ALL RESPONSES TO PHQ QUESTIONS 1-9: 0

## 2025-03-14 ASSESSMENT — LIFESTYLE VARIABLES: HOW OFTEN DO YOU HAVE A DRINK CONTAINING ALCOHOL: NEVER

## 2025-03-14 NOTE — PATIENT INSTRUCTIONS
Taste food before salting. Add only a little salt when you think you need it. With time, your taste buds will adjust to less salt.     Eat fewer snack items, fast foods, canned soups, and other high-salt, high-fat, processed foods.     Read food labels and try to avoid saturated and trans fats. They increase your risk of heart disease by raising cholesterol levels.     Limit the amount of solid fat--butter, margarine, and shortening--you eat. Use olive, peanut, or canola oil when you cook. Bake, broil, and steam foods instead of frying them.     Eat a variety of fruit and vegetables every day. Dark green, deep orange, red, or yellow fruits and vegetables are especially good for you. Examples include spinach, carrots, peaches, and berries.     Foods high in fiber can reduce your cholesterol and provide important vitamins and minerals. High-fiber foods include whole-grain cereals and breads, oatmeal, beans, brown rice, citrus fruits, and apples.     Eat lean proteins. Heart-healthy proteins include seafood, lean meats and poultry, eggs, beans, peas, nuts, seeds, and soy products.     Limit drinks and foods with added sugar. These include candy, desserts, and soda pop.   Heart-healthy lifestyle    If your doctor recommends it, get more exercise. For many people, walking is a good choice. Or you may want to swim, bike, or do other activities. Bit by bit, increase the time you're active every day. Try for at least 30 minutes on most days of the week.     Try to quit or cut back on using tobacco and other nicotine products. This includes smoking and vaping. If you need help quitting, talk to your doctor about stop-smoking programs and medicines. These can increase your chances of quitting for good. Quitting is one of the most important things you can do to protect your heart. It is never too late to quit. Try to avoid secondhand smoke too.     Stay at a weight that's healthy for you. Talk to your doctor if you need help

## 2025-03-14 NOTE — PROGRESS NOTES
[facility]  Internal Medicine  Patient Encounter  Keith Valenzuela D.O., [facility]       Medicare Annual Wellness Visit  Heriberto Rene  3/14/2025    Subjective Heriberto Rene is here for Medicare AWV      Medical/Surgical Histories     Past Medical History:   Diagnosis Date    Acquired spondylolisthesis of lumbosacral region 02/08/2019    Alcoholism (HCC)     Anxiety     Basal cell cancer 08/2010    Dr. Bragg    Cataracts, bilateral 08/2023    Chronic bilateral low back pain without sciatica 02/08/2019    Closed displaced fracture of first metacarpal bone of right hand 12/20/2022    Residual extensor tendon rupture.    DDD (degenerative disc disease), lumbar 02/08/2019    Depression     DVT, lower extremity (HCC) 05/29/2015    Right, gastroc, soleal    GERD (gastroesophageal reflux disease)     Hand fracture, right 12/2022    Hyperlipidemia     Hypertension     Impaired fasting glucose 09/06/2023    Lumbar facet arthropathy 02/15/2019    Lumbar foraminal stenosis 02/15/2019    Medial meniscus tear 09/2023    HOMER (obstructive sleep apnea) 04/12/2023    Osteopenia     Osteoporosis Ongoing    Seasonal allergic rhinitis 09/11/2024    Sleep apnea     Squamous cell skin cancer 2017    right ear lobe    Stasis dermatitis     Varicose veins of both lower extremities     Venous insufficiency of both lower extremities           Past Surgical History:   Procedure Laterality Date    ANESTHESIA NERVE BLOCK Bilateral 03/25/2019    BILATERAL L3,L4,L5 DR MEDIAL BRANCH BLOCKS WITH FLUOROSCOPY performed by Janie Damon MD at [facility]    COLONOSCOPY      FACIAL RECONSTRUCTION SURGERY      from [facility]    HAND SURGERY  12/27/2022    ORIF right 1st metacarpal, Dr. Glaser    KNEE ARTHROSCOPY Right 09/2023    [facility]    KNEE SURGERY  09/2023    LARYNGOSCOPY N/A 06/26/2023    DRUG INDUCED SLEEP ENDOSCOPY performed by Gabi Renee DO at [facility] OR    LUMBAR SPINE SURGERY Bilateral 03/04/2019    BILATERAL L3, L4, L5 DORSAL

## 2025-03-17 ENCOUNTER — RESULTS FOLLOW-UP (OUTPATIENT)
Dept: INTERNAL MEDICINE CLINIC | Age: 75
End: 2025-03-17

## 2025-03-17 DIAGNOSIS — I10 BENIGN ESSENTIAL HTN: ICD-10-CM

## 2025-03-17 DIAGNOSIS — R73.01 IMPAIRED FASTING GLUCOSE: ICD-10-CM

## 2025-03-17 DIAGNOSIS — E78.5 DYSLIPIDEMIA: ICD-10-CM

## 2025-03-17 DIAGNOSIS — D53.9 MACROCYTIC ANEMIA: ICD-10-CM

## 2025-03-17 DIAGNOSIS — Z12.5 SCREENING FOR PROSTATE CANCER: ICD-10-CM

## 2025-03-17 DIAGNOSIS — Z00.00 MEDICARE ANNUAL WELLNESS VISIT, SUBSEQUENT: ICD-10-CM

## 2025-03-17 LAB
ALBUMIN SERPL-MCNC: 4.4 G/DL (ref 3.4–5)
ALBUMIN/GLOB SERPL: 1.9 {RATIO} (ref 1.1–2.2)
ALP SERPL-CCNC: 71 U/L (ref 40–129)
ALT SERPL-CCNC: 21 U/L (ref 10–40)
ANION GAP SERPL CALCULATED.3IONS-SCNC: 11 MMOL/L (ref 3–16)
AST SERPL-CCNC: 31 U/L (ref 15–37)
BILIRUB SERPL-MCNC: 0.7 MG/DL (ref 0–1)
BUN SERPL-MCNC: 16 MG/DL (ref 7–20)
CALCIUM SERPL-MCNC: 9.4 MG/DL (ref 8.3–10.6)
CHLORIDE SERPL-SCNC: 99 MMOL/L (ref 99–110)
CHOLEST SERPL-MCNC: 121 MG/DL (ref 0–199)
CO2 SERPL-SCNC: 25 MMOL/L (ref 21–32)
CREAT SERPL-MCNC: 1 MG/DL (ref 0.8–1.3)
EST. AVERAGE GLUCOSE BLD GHB EST-MCNC: 88.2 MG/DL
GFR SERPLBLD CREATININE-BSD FMLA CKD-EPI: 78 ML/MIN/{1.73_M2}
GLUCOSE SERPL-MCNC: 100 MG/DL (ref 70–99)
HBA1C MFR BLD: 4.7 %
HDLC SERPL-MCNC: 50 MG/DL (ref 40–60)
LDLC SERPL CALC-MCNC: 52 MG/DL
POTASSIUM SERPL-SCNC: 4.7 MMOL/L (ref 3.5–5.1)
PROT SERPL-MCNC: 6.7 G/DL (ref 6.4–8.2)
PSA SERPL DL<=0.01 NG/ML-MCNC: 0.09 NG/ML (ref 0–4)
SODIUM SERPL-SCNC: 135 MMOL/L (ref 136–145)
TRIGL SERPL-MCNC: 96 MG/DL (ref 0–150)
TSH SERPL DL<=0.005 MIU/L-ACNC: 4.31 UIU/ML (ref 0.27–4.2)
VLDLC SERPL CALC-MCNC: 19 MG/DL

## 2025-03-20 LAB
BASOPHILS # BLD: 0.1 K/UL (ref 0–0.2)
BASOPHILS NFR BLD: 1.4 %
DEPRECATED RDW RBC AUTO: 13.6 % (ref 12.4–15.4)
EOSINOPHIL # BLD: 0.7 K/UL (ref 0–0.6)
EOSINOPHIL NFR BLD: 8.8 %
HCT VFR BLD AUTO: 32.2 % (ref 40.5–52.5)
HGB BLD-MCNC: 11.3 G/DL (ref 13.5–17.5)
LYMPHOCYTES # BLD: 2.2 K/UL (ref 1–5.1)
LYMPHOCYTES NFR BLD: 28 %
MCH RBC QN AUTO: 35.7 PG (ref 26–34)
MCHC RBC AUTO-ENTMCNC: 35.1 G/DL (ref 31–36)
MCV RBC AUTO: 101.6 FL (ref 80–100)
MONOCYTES # BLD: 0.5 K/UL (ref 0–1.3)
MONOCYTES NFR BLD: 6 %
NEUTROPHILS # BLD: 4.4 K/UL (ref 1.7–7.7)
NEUTROPHILS NFR BLD: 55.8 %
PLATELET # BLD AUTO: 373 K/UL (ref 135–450)
PMV BLD AUTO: 8.4 FL (ref 5–10.5)
RBC # BLD AUTO: 3.17 M/UL (ref 4.2–5.9)
WBC # BLD AUTO: 7.8 K/UL (ref 4–11)

## 2025-05-02 RX ORDER — FENOFIBRATE 160 MG/1
160 TABLET ORAL DAILY
Qty: 90 TABLET | Refills: 3 | Status: SHIPPED | OUTPATIENT
Start: 2025-05-02

## 2025-05-15 ENCOUNTER — OFFICE VISIT (OUTPATIENT)
Dept: PULMONOLOGY | Age: 75
End: 2025-05-15
Payer: MEDICARE

## 2025-05-15 VITALS
DIASTOLIC BLOOD PRESSURE: 64 MMHG | HEART RATE: 51 BPM | SYSTOLIC BLOOD PRESSURE: 110 MMHG | HEIGHT: 66 IN | BODY MASS INDEX: 29.41 KG/M2 | OXYGEN SATURATION: 100 % | WEIGHT: 183 LBS

## 2025-05-15 DIAGNOSIS — J45.20 MILD INTERMITTENT ASTHMA WITHOUT COMPLICATION: Primary | ICD-10-CM

## 2025-05-15 DIAGNOSIS — J30.2 SEASONAL ALLERGIC RHINITIS, UNSPECIFIED TRIGGER: ICD-10-CM

## 2025-05-15 DIAGNOSIS — R05.3 CHRONIC COUGH: ICD-10-CM

## 2025-05-15 PROCEDURE — G2211 COMPLEX E/M VISIT ADD ON: HCPCS | Performed by: INTERNAL MEDICINE

## 2025-05-15 PROCEDURE — G8428 CUR MEDS NOT DOCUMENT: HCPCS | Performed by: INTERNAL MEDICINE

## 2025-05-15 PROCEDURE — 3017F COLORECTAL CA SCREEN DOC REV: CPT | Performed by: INTERNAL MEDICINE

## 2025-05-15 PROCEDURE — 1123F ACP DISCUSS/DSCN MKR DOCD: CPT | Performed by: INTERNAL MEDICINE

## 2025-05-15 PROCEDURE — 99214 OFFICE O/P EST MOD 30 MIN: CPT | Performed by: INTERNAL MEDICINE

## 2025-05-15 PROCEDURE — G8417 CALC BMI ABV UP PARAM F/U: HCPCS | Performed by: INTERNAL MEDICINE

## 2025-05-15 PROCEDURE — 3074F SYST BP LT 130 MM HG: CPT | Performed by: INTERNAL MEDICINE

## 2025-05-15 PROCEDURE — 1036F TOBACCO NON-USER: CPT | Performed by: INTERNAL MEDICINE

## 2025-05-15 PROCEDURE — 3078F DIAST BP <80 MM HG: CPT | Performed by: INTERNAL MEDICINE

## 2025-05-15 RX ORDER — AZELASTINE HCL 205.5 UG/1
SPRAY, METERED NASAL
COMMUNITY

## 2025-05-15 NOTE — ASSESSMENT & PLAN NOTE
Dealing with this for about 3 months, had COVID about 2 months ago. Has been slowly getting better. Received URI treatment, no substantial response to YUNIEL.  Has received 2 courses of abx, last one about 2 weeks prior to initial visit. Minimal yellow/green sputum.   No significant atopy reported, no seasonal problems but he does take Zyrtec. Has tried Flonase 2 and Astelin 1 spray each daily. Has been evaluated by ENT for sinusitis requiring surgery.  Continues to report rubbery mucus secretion with cough despite current regimen.   -Continue Astelin BID  -Continue Flonase BID  -Nonpharmacological GERD management strategies provided.  -Albuterol PRN

## 2025-05-15 NOTE — ASSESSMENT & PLAN NOTE
Has used YUNIEL in the past, PFT obstructive with history of seasonal allergies and sinusitis in the past, also has Eosinophilia, suggest asthmatic picture.   -Continue YUNIEL PRN for now

## 2025-05-15 NOTE — ASSESSMENT & PLAN NOTE
Still having issues with constant throat clearing, this was brought up by his wife on today's visit.   -Continue Flonase BID with Astelin BID as well  -Continue nasal rinses  -Continue Singulair 10 mg daily, side effect profile extensively discussed, if depression-like symptoms were to develop I instructed the patient to stop the medication and let me know.    -Allergy/Immunology referral placed.

## 2025-05-15 NOTE — PROGRESS NOTES
[facility]/[place] [facility]    OUTPATIENT INITIAL NOTE    SUBJECTIVE:   Referring Physician: Bianca GRANADOS    CHIEF COMPLAINT / HPI:    Heriberto is a 74 y.o. male that initially presented to clinic for evaluation of chronic cough.  Has been dealing with this for about 3 months  GERD is not a problem.   Relevant Social History  Tobacco: Never   Substances: EtOH in the past  Occupational: Retired pediatrician  Pets: None  Family history of pulmonary problems: None that he is aware of.     INTERVAL HISTORY:  2024 11/20 - Cough is better, still present but much improved.    2025  05/15 - Added Singulair to his asthma regimen last visit, since then still having some intermittent \"rubbery mucus\" expectoration during the mornings and sometimes a couple times daily.    Past Medical History:    Past Medical History:   Diagnosis Date    Acquired spondylolisthesis of lumbosacral region 02/08/2019    Alcoholism (HCC)     Anxiety     Basal cell cancer 08/2010    Dr. Bragg    Cataracts, bilateral 08/2023    Chronic bilateral low back pain without sciatica 02/08/2019    Closed displaced fracture of first metacarpal bone of right hand 12/20/2022    Residual extensor tendon rupture.    DDD (degenerative disc disease), lumbar 02/08/2019    Depression     DVT, lower extremity (HCC) 05/29/2015    Right, gastroc, soleal    GERD (gastroesophageal reflux disease)     Hand fracture, right 12/2022    Hyperlipidemia     Hypertension     Impaired fasting glucose 09/06/2023    Lumbar facet arthropathy 02/15/2019    Lumbar foraminal stenosis 02/15/2019    Medial meniscus tear 09/2023    HOMER (obstructive sleep apnea) 04/12/2023    Osteopenia     Osteoporosis Ongoing    Seasonal allergic rhinitis 09/11/2024    Sleep apnea     Squamous cell skin cancer 2017    right ear lobe    Stasis dermatitis     Varicose veins of both lower extremities     Venous insufficiency of both lower extremities      Social History:

## 2025-05-15 NOTE — PATIENT INSTRUCTIONS
[facility]  Dr. Giuliana Carrera  Please call [phone] to schedule your appointment or visit [email or web address] and click schedule.

## 2025-06-26 ENCOUNTER — TELEPHONE (OUTPATIENT)
Dept: INTERNAL MEDICINE CLINIC | Age: 75
End: 2025-06-26

## 2025-06-26 NOTE — TELEPHONE ENCOUNTER
ULICES  The patient called into the office for Dr. Valenzuela in regards to letting him know that he had a CTA done. They found that the patient has severe coronary disease. Will be seeing his cardiologist Dr. Brooks next Tuesday and will probably be getting a cardioangiogram that day. The CTA results are located in [facility]. They also told the patient if he was having chest pains or anything of sort to go to the ED asap.

## 2025-06-27 NOTE — TELEPHONE ENCOUNTER
Spoke with patient.  Reviewed coronary CTA.  He has a follow-up with cardiology this coming Tuesday.  He is asymptomatic at this time other than some mild shortness of breath with exertion.  Advised that he take it easy this weekend and avoid any strenuous activity.  He will continue his aspirin, beta-blocker and statin.

## 2025-08-05 DIAGNOSIS — R00.0 RACING HEART BEAT: ICD-10-CM

## 2025-08-06 RX ORDER — METOPROLOL SUCCINATE 25 MG/1
25 TABLET, EXTENDED RELEASE ORAL DAILY
Qty: 90 TABLET | Refills: 3 | Status: SHIPPED | OUTPATIENT
Start: 2025-08-06

## 2025-08-14 DIAGNOSIS — F41.9 ANXIETY: ICD-10-CM

## 2025-08-14 DIAGNOSIS — F32.5 MAJOR DEPRESSIVE DISORDER WITH SINGLE EPISODE, IN FULL REMISSION: ICD-10-CM

## 2025-08-14 DIAGNOSIS — F42.8 OBSESSIVE THINKING: ICD-10-CM

## 2025-08-14 RX ORDER — CLOMIPRAMINE HYDROCHLORIDE 75 MG/1
CAPSULE ORAL
Qty: 180 CAPSULE | Refills: 1 | Status: SHIPPED | OUTPATIENT
Start: 2025-08-14

## 2025-08-18 DIAGNOSIS — J01.90 SUBACUTE SINUSITIS, UNSPECIFIED LOCATION: ICD-10-CM

## 2025-08-18 RX ORDER — FLUTICASONE PROPIONATE 50 MCG
2 SPRAY, SUSPENSION (ML) NASAL DAILY
Qty: 16 G | Refills: 5 | Status: SHIPPED | OUTPATIENT
Start: 2025-08-18

## (undated) DEVICE — GLOVE SURG SZ 6 THK91MIL LTX FREE SYN POLYISOPRENE ANTI

## (undated) DEVICE — STANDARD HYPODERMIC NEEDLE,POLYPROPYLENE HUB: Brand: MONOJECT

## (undated) DEVICE — CODMAN® SURGICAL PATTIES 1/2" X 3" (1.27CM X 7.62CM): Brand: CODMAN®

## (undated) DEVICE — BLADE, TONGUE, 6", STERILE: Brand: MEDLINE

## (undated) DEVICE — CANNULA ELECTROTHERAPY 20GA L15CM TIP L10MM RF CRV SHRP SMK

## (undated) DEVICE — STERILE DISPOSABLE EQUIPMENT COVER - DOME COVER 22" DEPTH: Brand: PREFERRED MEDICAL PRODUCTS, LLC

## (undated) DEVICE — ELECTRODE PT RET AD L9FT HI MOIST COND ADH HYDRGEL CORDED

## (undated) DEVICE — DRAPE ADOLESCENT  LAPAROTOMY

## (undated) DEVICE — NEEDLE SPNL 22GA L3.5IN BLK HUB S STL REG WALL FIT STYL W/

## (undated) DEVICE — REMOTE SLEEP PATIENT CONTROL

## (undated) DEVICE — SUTURE PERMAHAND SZ 2-0 L18IN NONABSORBABLE BLK L26MM SH C012D

## (undated) DEVICE — PROBE 8225401 5PK SD-SD BIPOL STIM ROHS

## (undated) DEVICE — NEEDLE SPNL 25GA L3.5IN BLU HUB S STL REG WALL FIT STYL W/

## (undated) DEVICE — CORD ES L12FT BPLR FRCP

## (undated) DEVICE — SOLUTION IRRIG 500ML 0.9% SOD CHLO USP POUR PLAS BTL

## (undated) DEVICE — PEN: MARKING STD 100/CS: Brand: MEDICAL ACTION INDUSTRIES

## (undated) DEVICE — STERILE POLYISOPRENE POWDER-FREE SURGICAL GLOVES: Brand: PROTEXIS

## (undated) DEVICE — UNIVERSAL BLOCK TRAY: Brand: AVANOS*

## (undated) DEVICE — NEEDLE HYPO 25GA L1.5IN BLU POLYPR HUB S STL REG BVL STR

## (undated) DEVICE — SYRINGE MED 10ML LUERLOCK TIP W/O SFTY DISP

## (undated) DEVICE — PORT VLV 2 W NDL FREE SMRTSITE

## (undated) DEVICE — BLADE ES ELASTOMERIC COAT INSUL DURABLE BEND UPTO 90DEG

## (undated) DEVICE — LOOP VES W13MM THK09MM MINI RED SIL FLD REPELLENT

## (undated) DEVICE — Z DISCONTNUED USE 2132719 NEEDLE FLTR 19GA L1.5IN WALL THK5UM BRN POLYPR HUB S STL

## (undated) DEVICE — CHLORAPREP 26ML ORANGE

## (undated) DEVICE — MARKER,SKIN,WI/RULER AND LABELS: Brand: MEDLINE

## (undated) DEVICE — 3M™ STERI-DRAPE™ FLUOROSCOPE DRAPE, 10 PER CARTON / 4 CARTONS PER CASE, 1012: Brand: STERI-DRAPE™

## (undated) DEVICE — Z CONVERTED USE 2275871 SPONGE GZ W4XL4IN WHT 8 PLY CURITY

## (undated) DEVICE — SPONGE,DISSECTOR,K,XRAY,9/16"X1/4",STRL: Brand: MEDLINE

## (undated) DEVICE — MEDIA CONTRAST RX ISOVUE-300 61% 30ML VIALS

## (undated) DEVICE — FIRM 8CM: Brand: NASOPORE

## (undated) DEVICE — DRAPE,INSTRUMENT,MAGNETIC,10X16: Brand: MEDLINE

## (undated) DEVICE — CATHETER IV 20 GAX1 IN N WNG KINK RESIST INSYT AUTOGRD

## (undated) DEVICE — PAD GRND NEUT ELECTRD AD DISP

## (undated) DEVICE — SPECIMEN SOCK - FEMALE: Brand: MEDI-VAC

## (undated) DEVICE — GLOVE ORANGE PI 7 1/2   MSG9075

## (undated) DEVICE — 3M™ IOBAN™ 2 ANTIMICROBIAL INCISE DRAPE 6650EZ: Brand: IOBAN™ 2

## (undated) DEVICE — SOLUTION IV 250ML 0.9% SOD CHL PH 5 INJ USP VIAFLX PLAS

## (undated) DEVICE — MINOR SET UP PACK: Brand: MEDLINE INDUSTRIES, INC.

## (undated) DEVICE — NASAL FESS: Brand: MEDLINE INDUSTRIES, INC.

## (undated) DEVICE — INTENDED FOR TISSUE SEPARATION, AND OTHER PROCEDURES THAT REQUIRE A SHARP SURGICAL BLADE TO PUNCTURE OR CUT.: Brand: BARD-PARKER ® CARBON RIB-BACK BLADES

## (undated) DEVICE — SHEET,DRAPE,53X77,STERILE: Brand: MEDLINE

## (undated) DEVICE — DRAPE IRRIG FLD WRM W44XL44IN W/ AORN STD PRTBL INTRATEMP

## (undated) DEVICE — APPLICATOR MEDICATED 26 CC SOLUTION HI LT ORNG CHLORAPREP

## (undated) DEVICE — SUTURE VCRL + SZ 3-0 L18IN ABSRB UD SH 1/2 CIR TAPERCUT NDL VCP864D

## (undated) DEVICE — GAUZE,SPONGE,4"X4",16PLY,XRAY,STRL,LF: Brand: MEDLINE

## (undated) DEVICE — LIQUIBAND RAPID ADHESIVE 36/CS 0.8ML: Brand: MEDLINE

## (undated) DEVICE — SUTURE NONABSORBABLE MONOFILAMENT 5-0 PS-2 18 IN BLK ETHILON 1666H

## (undated) DEVICE — PENCIL ES CRD L10FT HND SWCHING ROCK SWCH W/ EDGE COAT BLDE

## (undated) DEVICE — Device: Brand: JELCO

## (undated) DEVICE — HYPODERMIC SAFETY NEEDLE: Brand: MAGELLAN

## (undated) DEVICE — SINGLE USE FLEXIBLE RHINOLARYNGOSCOPE

## (undated) DEVICE — TOWEL,OR,DSP,ST,BLUE,DLX,8/PK,10PK/CS: Brand: MEDLINE

## (undated) DEVICE — 3M™ TEGADERM™ +PAD FILM DRESSING WITH NON-ADHERENT PAD, 3588, 6 IN X 6 IN (15 CM X 15 CM), 25/CAR, 4 CAR/CS: Brand: 3M™ TEGADERM™

## (undated) DEVICE — 3M™ STERI-DRAPE™ INSTRUMENT POUCH 1018: Brand: STERI-DRAPE™

## (undated) DEVICE — SUTURE MCRYL + SZ 4-0 L18IN ABSRB UD L19MM PS-2 3/8 CIR MCP496G

## (undated) DEVICE — TOWEL,OR,DSP,ST,BLUE,DLX,1/PK,80PK/CS: Brand: MEDLINE

## (undated) DEVICE — TOWEL,OR,DSP,ST,BLUE,STD,4/PK,20PK/CS: Brand: MEDLINE

## (undated) DEVICE — DISPOSABLE OR TOWEL: Brand: CARDINAL HEALTH

## (undated) DEVICE — TUBING, SUCTION, 1/4" X 12', STRAIGHT: Brand: MEDLINE

## (undated) DEVICE — NEEDLE RF 20GA L10CM TIP L10MM CVD ACT TIP SL

## (undated) DEVICE — LOOP VES RADIOPAQUE MAXI 406X1 MM SIL BLU STRL STERION LF

## (undated) DEVICE — BLADE 1884004HR TRICUT 5PK M4 4MM ROTATE: Brand: TRICUT

## (undated) DEVICE — CONTAINER,SPECIMEN,OR STERILE,4OZ: Brand: MEDLINE

## (undated) DEVICE — SHEARS ENDOSCP L9CM CRV HARM FOCS +

## (undated) DEVICE — SHEATH 1912000 5PK 4MM/0DEG STORZ XOMED: Brand: ENDO-SCRUB®

## (undated) DEVICE — GLOVE ORANGE PI 7   MSG9070

## (undated) DEVICE — PROVE COVER: Brand: UNBRANDED

## (undated) DEVICE — TOWEL,OR,DSP,ST,BLUE,STD,8/PK,10PK/CS: Brand: MEDLINE

## (undated) DEVICE — TOWEL,STOP FLAG GOLD N-W: Brand: MEDLINE

## (undated) DEVICE — TUBING, SUCTION, 3/16" X 12', STRAIGHT: Brand: MEDLINE

## (undated) DEVICE — HEAD AND NECK PACK: Brand: CONVERTORS